# Patient Record
Sex: MALE | Race: ASIAN | NOT HISPANIC OR LATINO
[De-identification: names, ages, dates, MRNs, and addresses within clinical notes are randomized per-mention and may not be internally consistent; named-entity substitution may affect disease eponyms.]

---

## 2019-02-08 ENCOUNTER — APPOINTMENT (OUTPATIENT)
Dept: CARDIOLOGY | Facility: CLINIC | Age: 79
End: 2019-02-08
Payer: MEDICARE

## 2019-02-08 ENCOUNTER — NON-APPOINTMENT (OUTPATIENT)
Age: 79
End: 2019-02-08

## 2019-02-08 VITALS
RESPIRATION RATE: 18 BRPM | DIASTOLIC BLOOD PRESSURE: 84 MMHG | WEIGHT: 153 LBS | BODY MASS INDEX: 25.49 KG/M2 | SYSTOLIC BLOOD PRESSURE: 161 MMHG | HEART RATE: 94 BPM | HEIGHT: 65 IN | TEMPERATURE: 98.3 F | OXYGEN SATURATION: 99 %

## 2019-02-08 DIAGNOSIS — Z98.890 OTHER SPECIFIED POSTPROCEDURAL STATES: ICD-10-CM

## 2019-02-08 DIAGNOSIS — Z87.898 PERSONAL HISTORY OF OTHER SPECIFIED CONDITIONS: ICD-10-CM

## 2019-02-08 DIAGNOSIS — R06.09 OTHER FORMS OF DYSPNEA: ICD-10-CM

## 2019-02-08 DIAGNOSIS — G40.909 EPILEPSY, UNSPECIFIED, NOT INTRACTABLE, W/OUT STATUS EPILEPTICUS: ICD-10-CM

## 2019-02-08 DIAGNOSIS — E03.9 HYPOTHYROIDISM, UNSPECIFIED: ICD-10-CM

## 2019-02-08 DIAGNOSIS — R60.0 LOCALIZED EDEMA: ICD-10-CM

## 2019-02-08 DIAGNOSIS — Z78.9 OTHER SPECIFIED HEALTH STATUS: ICD-10-CM

## 2019-02-08 DIAGNOSIS — Z83.3 FAMILY HISTORY OF DIABETES MELLITUS: ICD-10-CM

## 2019-02-08 DIAGNOSIS — Z87.891 PERSONAL HISTORY OF NICOTINE DEPENDENCE: ICD-10-CM

## 2019-02-08 DIAGNOSIS — I10 ESSENTIAL (PRIMARY) HYPERTENSION: ICD-10-CM

## 2019-02-08 DIAGNOSIS — Z82.49 FAMILY HISTORY OF ISCHEMIC HEART DISEASE AND OTHER DISEASES OF THE CIRCULATORY SYSTEM: ICD-10-CM

## 2019-02-08 PROCEDURE — 93000 ELECTROCARDIOGRAM COMPLETE: CPT | Mod: 59

## 2019-02-08 PROCEDURE — 93306 TTE W/DOPPLER COMPLETE: CPT

## 2019-02-08 PROCEDURE — 99205 OFFICE O/P NEW HI 60 MIN: CPT

## 2019-02-08 RX ORDER — LOSARTAN POTASSIUM 50 MG/1
50 TABLET, FILM COATED ORAL
Qty: 30 | Refills: 1 | Status: ACTIVE | COMMUNITY
Start: 2019-02-08 | End: 1900-01-01

## 2019-02-08 RX ORDER — PROPRANOLOL HYDROCHLORIDE 20 MG/1
20 TABLET ORAL TWICE DAILY
Qty: 60 | Refills: 1 | Status: ACTIVE | COMMUNITY
Start: 1900-01-01 | End: 1900-01-01

## 2019-02-08 RX ORDER — CLOTRIMAZOLE AND BETAMETHASONE DIPROPIONATE 10; .5 MG/G; MG/G
1-0.05 CREAM TOPICAL
Qty: 45 | Refills: 0 | Status: ACTIVE | COMMUNITY
Start: 2018-09-26

## 2019-02-08 RX ORDER — TOBRAMYCIN AND DEXAMETHASONE 3; 1 MG/ML; MG/ML
0.3-0.1 SUSPENSION/ DROPS OPHTHALMIC
Qty: 5 | Refills: 0 | Status: ACTIVE | COMMUNITY
Start: 2018-12-17

## 2019-02-08 NOTE — DISCUSSION/SUMMARY
[Hypertension] : hypertension [Stable] : stable [Echocardiogram] : echocardiogram [Medication Changes Per Orders] : as documented in orders [Sodium Restriction] : sodium restriction [Patient] : the patient [Family] : the patient's family [de-identified] : increase betablocker, adding ACEI, cholesterol as well. [FreeTextEntry1] : s/p dissection of ascending aorta  post repair was discussed.  need to control BP, reduce cholesterol, watch for renal disease. etc. The leg edema likely related to the  CCB.

## 2019-02-08 NOTE — PHYSICAL EXAM
[General Appearance - Well Developed] : well developed [Normal Appearance] : normal appearance [Well Groomed] : well groomed [General Appearance - Well Nourished] : well nourished [No Deformities] : no deformities [General Appearance - In No Acute Distress] : no acute distress [Normal Conjunctiva] : the conjunctiva exhibited no abnormalities [Eyelids - No Xanthelasma] : the eyelids demonstrated no xanthelasmas [Normal Oral Mucosa] : normal oral mucosa [No Oral Pallor] : no oral pallor [No Oral Cyanosis] : no oral cyanosis [Normal Jugular Venous A Waves Present] : normal jugular venous A waves present [Normal Jugular Venous V Waves Present] : normal jugular venous V waves present [No Jugular Venous Crane A Waves] : no jugular venous crane A waves [Heart Rate And Rhythm] : heart rate and rhythm were normal [Heart Sounds] : normal S1 and S2 [Murmurs] : no murmurs present [Arterial Pulses Normal] : the arterial pulses were normal [Edema] : no peripheral edema present [Veins - Varicosity Changes] : no varicosital changes were noted in the lower extremities [Respiration, Rhythm And Depth] : normal respiratory rhythm and effort [Exaggerated Use Of Accessory Muscles For Inspiration] : no accessory muscle use [Auscultation Breath Sounds / Voice Sounds] : lungs were clear to auscultation bilaterally [Chest Palpation] : palpation of the chest revealed no abnormalities [Lungs Percussion] : the lungs were normal to percussion [Bowel Sounds] : normal bowel sounds [Abdomen Soft] : soft [Abdomen Tenderness] : non-tender [Abdomen Mass (___ Cm)] : no abdominal mass palpated [Abdomen Hernia] : no hernia was discovered [Abnormal Walk] : normal gait [Gait - Sufficient For Exercise Testing] : the gait was sufficient for exercise testing [Nail Clubbing] : no clubbing of the fingernails [Cyanosis, Localized] : no localized cyanosis [Petechial Hemorrhages (___cm)] : no petechial hemorrhages [Skin Color & Pigmentation] : normal skin color and pigmentation [Skin Turgor] : normal skin turgor [] : no rash [No Venous Stasis] : no venous stasis [Skin Lesions] : no skin lesions [No Skin Ulcers] : no skin ulcer [No Xanthoma] : no  xanthoma was observed [Oriented To Time, Place, And Person] : oriented to person, place, and time [Impaired Insight] : insight and judgment were intact [Affect] : the affect was normal [Mood] : the mood was normal [Memory Recent] : recent memory was not impaired [Memory Remote] : remote memory was not impaired [No Anxiety] : not feeling anxious

## 2019-02-08 NOTE — HISTORY OF PRESENT ILLNESS
[FreeTextEntry1] : Patient, a 78 year old male at s/p  aortic dissection repair on 10- after syncope, is here with the complaints of exertional shortness of breath,sleepy and noted leg edema.\par He has no chest pain, no resting shortness of breath, no dizziness or palpitations.

## 2019-02-08 NOTE — REASON FOR VISIT
[Consultation] : a consultation regarding [Dyspnea] : dyspnea [Hypertension] : hypertension [FreeTextEntry1] : s/p aortic  dissection repair

## 2019-02-08 NOTE — REVIEW OF SYSTEMS
[Feeling Fatigued] : feeling fatigued [Dyspnea on exertion] : dyspnea during exertion [Lower Ext Edema] : lower extremity edema [Negative] : Heme/Lymph [Fever] : no fever [Headache] : no headache [Chills] : no chills [Blurry Vision] : no blurred vision [Seeing Double (Diplopia)] : no diplopia [Eye Pain] : no eye pain [Eyeglasses] : not currently wearing eyeglasses [Earache] : no earache [Discharge From The Ears] : no discharge from the ears [Loss Of Hearing] : no hearing loss [Mouth Sores] : no mouth sores [Sore Throat] : no sore throat [Sinus Pressure] : no sinus pressure [Shortness Of Breath] : no shortness of breath [Chest  Pressure] : no chest pressure [Chest Pain] : no chest pain [Leg Claudication] : no intermittent leg claudication [Palpitations] : no palpitations [Cough] : no cough [Wheezing] : no wheezing [Coughing Up Blood] : no hemoptysis [Abdominal Pain] : no abdominal pain [Nausea] : no nausea [Vomiting] : no vomiting [Heartburn] : no heartburn [Change in Appetite] : no change in appetite [Change In The Stool] : no change in stool [Dysphagia] : no dysphagia [Urinary Frequency] : no change in urinary frequency [Hematuria] : no hematuria [Pain During Urination] : no dysuria [Impotence] : no impotence [Nocturia] : no nocturia [Joint Pain] : no joint pain [Joint Swelling] : no joint swelling [Joint Stiffness] : no joint stiffness [Muscle Cramps] : no muscle cramps [Limb Weakness (Paresis)] : no limb weakness [Skin: A Rash] : no rash: [Itching] : no itching [Change In Color Of Skin] : change in skin color [Skin Lesions] : no skin lesions [Dizziness] : no dizziness [Tremor] : no tremor was seen [Numbness (Hypesthesia)] : no numbness [Convulsions] : no convulsions [Tingling (Paresthesia)] : no tingling [Confusion] : no confusion was observed [Memory Lapses Or Loss] : no memory lapses or loss [Depression] : no depression [Anxiety] : no anxiety [Under Stress] : not under stress [Suicidal] : not suicidal [Excessive Thirst] : no polydipsia [Easy Bleeding] : no tendency for easy bleeding [Swollen Glands] : no swollen glands [Easy Bruising] : no tendency for easy bruising [Swollen Glands In The Neck] : no swollen glands in the neck

## 2019-02-11 ENCOUNTER — MEDICATION RENEWAL (OUTPATIENT)
Age: 79
End: 2019-02-11

## 2019-02-11 DIAGNOSIS — N28.9 DISORDER OF KIDNEY AND URETER, UNSPECIFIED: ICD-10-CM

## 2019-02-15 ENCOUNTER — LABORATORY RESULT (OUTPATIENT)
Age: 79
End: 2019-02-15

## 2019-02-15 PROBLEM — E03.9 HYPOTHYROIDISM, UNSPECIFIED TYPE: Status: ACTIVE | Noted: 2019-02-15

## 2019-02-15 LAB
24R-OH-CALCIDIOL SERPL-MCNC: 43.4 PG/ML
ALBUMIN SERPL ELPH-MCNC: 3.9 G/DL
ALP BLD-CCNC: 57 U/L
ALT SERPL-CCNC: 12 U/L
ANION GAP SERPL CALC-SCNC: 16 MMOL/L
AST SERPL-CCNC: 17 U/L
BASOPHILS # BLD AUTO: 0.02 K/UL
BASOPHILS NFR BLD AUTO: 0.3 %
BILIRUB SERPL-MCNC: <0.2 MG/DL
BUN SERPL-MCNC: 31 MG/DL
CALCIUM SERPL-MCNC: 9.3 MG/DL
CHLORIDE SERPL-SCNC: 105 MMOL/L
CHOLEST SERPL-MCNC: 239 MG/DL
CHOLEST/HDLC SERPL: 3.8 RATIO
CO2 SERPL-SCNC: 22 MMOL/L
CREAT SERPL-MCNC: 1.71 MG/DL
EOSINOPHIL # BLD AUTO: 0.39 K/UL
EOSINOPHIL NFR BLD AUTO: 5.3 %
GLUCOSE SERPL-MCNC: 82 MG/DL
HBA1C MFR BLD HPLC: 5.6 %
HCT VFR BLD CALC: 31.2 %
HDLC SERPL-MCNC: 63 MG/DL
HGB BLD-MCNC: 10 G/DL
IMM GRANULOCYTES NFR BLD AUTO: 0.3 %
LDLC SERPL CALC-MCNC: 155 MG/DL
LYMPHOCYTES # BLD AUTO: 0.96 K/UL
LYMPHOCYTES NFR BLD AUTO: 13 %
MAN DIFF?: NORMAL
MCHC RBC-ENTMCNC: 31 PG
MCHC RBC-ENTMCNC: 32.1 GM/DL
MCV RBC AUTO: 96.6 FL
MONOCYTES # BLD AUTO: 0.61 K/UL
MONOCYTES NFR BLD AUTO: 8.3 %
NEUTROPHILS # BLD AUTO: 5.38 K/UL
NEUTROPHILS NFR BLD AUTO: 72.8 %
NT-PROBNP SERPL-MCNC: 1694 PG/ML
PLATELET # BLD AUTO: 286 K/UL
POTASSIUM SERPL-SCNC: 5.2 MMOL/L
PROT SERPL-MCNC: 7.6 G/DL
RBC # BLD: 3.23 M/UL
RBC # FLD: 13.1 %
SODIUM SERPL-SCNC: 143 MMOL/L
T4 SERPL-MCNC: 7.1 UG/DL
TRIGL SERPL-MCNC: 106 MG/DL
TSH SERPL-ACNC: 11.94 UIU/ML
URATE SERPL-MCNC: 7.6 MG/DL
WBC # FLD AUTO: 7.38 K/UL

## 2019-02-15 RX ORDER — LEVOTHYROXINE SODIUM 0.03 MG/1
25 TABLET ORAL
Qty: 90 | Refills: 0 | Status: ACTIVE | COMMUNITY
Start: 2019-02-15 | End: 1900-01-01

## 2021-11-09 ENCOUNTER — INPATIENT (INPATIENT)
Facility: HOSPITAL | Age: 81
LOS: 3 days | Discharge: HOME | DRG: 668 | End: 2021-11-13
Attending: UROLOGY | Admitting: UROLOGY
Payer: COMMERCIAL

## 2021-11-09 VITALS
SYSTOLIC BLOOD PRESSURE: 150 MMHG | RESPIRATION RATE: 18 BRPM | WEIGHT: 160.06 LBS | HEIGHT: 64 IN | HEART RATE: 68 BPM | DIASTOLIC BLOOD PRESSURE: 86 MMHG | TEMPERATURE: 98 F | OXYGEN SATURATION: 98 %

## 2021-11-09 DIAGNOSIS — N32.89 OTHER SPECIFIED DISORDERS OF BLADDER: ICD-10-CM

## 2021-11-09 LAB
ALBUMIN SERPL ELPH-MCNC: 4.2 G/DL — SIGNIFICANT CHANGE UP (ref 3.3–5)
ALP SERPL-CCNC: 80 U/L — SIGNIFICANT CHANGE UP (ref 40–120)
ALT FLD-CCNC: 18 U/L — SIGNIFICANT CHANGE UP (ref 10–45)
ANION GAP SERPL CALC-SCNC: 12 MMOL/L — SIGNIFICANT CHANGE UP (ref 5–17)
APPEARANCE UR: ABNORMAL
APTT BLD: 42 SEC — HIGH (ref 27.5–35.5)
AST SERPL-CCNC: 23 U/L — SIGNIFICANT CHANGE UP (ref 10–40)
BACTERIA # UR AUTO: NEGATIVE — SIGNIFICANT CHANGE UP
BASOPHILS # BLD AUTO: 0.03 K/UL — SIGNIFICANT CHANGE UP (ref 0–0.2)
BASOPHILS # BLD AUTO: 0.03 K/UL — SIGNIFICANT CHANGE UP (ref 0–0.2)
BASOPHILS NFR BLD AUTO: 0.5 % — SIGNIFICANT CHANGE UP (ref 0–2)
BASOPHILS NFR BLD AUTO: 0.5 % — SIGNIFICANT CHANGE UP (ref 0–2)
BILIRUB SERPL-MCNC: 0.2 MG/DL — SIGNIFICANT CHANGE UP (ref 0.2–1.2)
BILIRUB UR-MCNC: NEGATIVE — SIGNIFICANT CHANGE UP
BLD GP AB SCN SERPL QL: NEGATIVE — SIGNIFICANT CHANGE UP
BUN SERPL-MCNC: 31 MG/DL — HIGH (ref 7–23)
CALCIUM SERPL-MCNC: 8.5 MG/DL — SIGNIFICANT CHANGE UP (ref 8.4–10.5)
CHLORIDE SERPL-SCNC: 110 MMOL/L — HIGH (ref 96–108)
CO2 SERPL-SCNC: 17 MMOL/L — LOW (ref 22–31)
COLOR SPEC: ABNORMAL
CREAT SERPL-MCNC: 1.52 MG/DL — HIGH (ref 0.5–1.3)
DIFF PNL FLD: ABNORMAL
EOSINOPHIL # BLD AUTO: 0.28 K/UL — SIGNIFICANT CHANGE UP (ref 0–0.5)
EOSINOPHIL # BLD AUTO: 0.36 K/UL — SIGNIFICANT CHANGE UP (ref 0–0.5)
EOSINOPHIL NFR BLD AUTO: 4.9 % — SIGNIFICANT CHANGE UP (ref 0–6)
EOSINOPHIL NFR BLD AUTO: 6.1 % — HIGH (ref 0–6)
EPI CELLS # UR: 0 — SIGNIFICANT CHANGE UP
GLUCOSE SERPL-MCNC: 102 MG/DL — HIGH (ref 70–99)
GLUCOSE UR QL: NEGATIVE — SIGNIFICANT CHANGE UP
HCT VFR BLD CALC: 28.5 % — LOW (ref 39–50)
HCT VFR BLD CALC: 30.3 % — LOW (ref 39–50)
HGB BLD-MCNC: 9.2 G/DL — LOW (ref 13–17)
HGB BLD-MCNC: 9.8 G/DL — LOW (ref 13–17)
HYALINE CASTS # UR AUTO: 0 /LPF — SIGNIFICANT CHANGE UP (ref 0–7)
IMM GRANULOCYTES NFR BLD AUTO: 0.2 % — SIGNIFICANT CHANGE UP (ref 0–1.5)
IMM GRANULOCYTES NFR BLD AUTO: 0.4 % — SIGNIFICANT CHANGE UP (ref 0–1.5)
INR BLD: 1.24 RATIO — HIGH (ref 0.88–1.16)
KETONES UR-MCNC: NEGATIVE — SIGNIFICANT CHANGE UP
LEUKOCYTE ESTERASE UR-ACNC: NEGATIVE — SIGNIFICANT CHANGE UP
LYMPHOCYTES # BLD AUTO: 1.11 K/UL — SIGNIFICANT CHANGE UP (ref 1–3.3)
LYMPHOCYTES # BLD AUTO: 1.25 K/UL — SIGNIFICANT CHANGE UP (ref 1–3.3)
LYMPHOCYTES # BLD AUTO: 19.6 % — SIGNIFICANT CHANGE UP (ref 13–44)
LYMPHOCYTES # BLD AUTO: 21.2 % — SIGNIFICANT CHANGE UP (ref 13–44)
MCHC RBC-ENTMCNC: 32.2 PG — SIGNIFICANT CHANGE UP (ref 27–34)
MCHC RBC-ENTMCNC: 32.3 GM/DL — SIGNIFICANT CHANGE UP (ref 32–36)
MCHC RBC-ENTMCNC: 32.3 GM/DL — SIGNIFICANT CHANGE UP (ref 32–36)
MCHC RBC-ENTMCNC: 32.3 PG — SIGNIFICANT CHANGE UP (ref 27–34)
MCV RBC AUTO: 100 FL — SIGNIFICANT CHANGE UP (ref 80–100)
MCV RBC AUTO: 99.7 FL — SIGNIFICANT CHANGE UP (ref 80–100)
MONOCYTES # BLD AUTO: 0.46 K/UL — SIGNIFICANT CHANGE UP (ref 0–0.9)
MONOCYTES # BLD AUTO: 0.52 K/UL — SIGNIFICANT CHANGE UP (ref 0–0.9)
MONOCYTES NFR BLD AUTO: 8.1 % — SIGNIFICANT CHANGE UP (ref 2–14)
MONOCYTES NFR BLD AUTO: 8.8 % — SIGNIFICANT CHANGE UP (ref 2–14)
NEUTROPHILS # BLD AUTO: 3.74 K/UL — SIGNIFICANT CHANGE UP (ref 1.8–7.4)
NEUTROPHILS # BLD AUTO: 3.76 K/UL — SIGNIFICANT CHANGE UP (ref 1.8–7.4)
NEUTROPHILS NFR BLD AUTO: 63.2 % — SIGNIFICANT CHANGE UP (ref 43–77)
NEUTROPHILS NFR BLD AUTO: 66.5 % — SIGNIFICANT CHANGE UP (ref 43–77)
NITRITE UR-MCNC: NEGATIVE — SIGNIFICANT CHANGE UP
NRBC # BLD: 0 /100 WBCS — SIGNIFICANT CHANGE UP (ref 0–0)
NRBC # BLD: 0 /100 WBCS — SIGNIFICANT CHANGE UP (ref 0–0)
PH UR: 6.5 — SIGNIFICANT CHANGE UP (ref 5–8)
PLATELET # BLD AUTO: 137 K/UL — LOW (ref 150–400)
PLATELET # BLD AUTO: 144 K/UL — LOW (ref 150–400)
POTASSIUM SERPL-MCNC: 5.1 MMOL/L — SIGNIFICANT CHANGE UP (ref 3.5–5.3)
POTASSIUM SERPL-SCNC: 5.1 MMOL/L — SIGNIFICANT CHANGE UP (ref 3.5–5.3)
PROT SERPL-MCNC: 6.8 G/DL — SIGNIFICANT CHANGE UP (ref 6–8.3)
PROT UR-MCNC: ABNORMAL
PROTHROM AB SERPL-ACNC: 14.7 SEC — HIGH (ref 10.6–13.6)
RBC # BLD: 2.86 M/UL — LOW (ref 4.2–5.8)
RBC # BLD: 3.03 M/UL — LOW (ref 4.2–5.8)
RBC # FLD: 13.5 % — SIGNIFICANT CHANGE UP (ref 10.3–14.5)
RBC # FLD: 13.5 % — SIGNIFICANT CHANGE UP (ref 10.3–14.5)
RBC CASTS # UR COMP ASSIST: >50 /HPF — HIGH (ref 0–4)
RH IG SCN BLD-IMP: POSITIVE — SIGNIFICANT CHANGE UP
SARS-COV-2 RNA SPEC QL NAA+PROBE: SIGNIFICANT CHANGE UP
SODIUM SERPL-SCNC: 139 MMOL/L — SIGNIFICANT CHANGE UP (ref 135–145)
SP GR SPEC: 1.01 — LOW (ref 1.01–1.02)
UROBILINOGEN FLD QL: NEGATIVE — SIGNIFICANT CHANGE UP
WBC # BLD: 5.66 K/UL — SIGNIFICANT CHANGE UP (ref 3.8–10.5)
WBC # BLD: 5.91 K/UL — SIGNIFICANT CHANGE UP (ref 3.8–10.5)
WBC # FLD AUTO: 5.66 K/UL — SIGNIFICANT CHANGE UP (ref 3.8–10.5)
WBC # FLD AUTO: 5.91 K/UL — SIGNIFICANT CHANGE UP (ref 3.8–10.5)
WBC UR QL: 0 /HPF — SIGNIFICANT CHANGE UP (ref 0–5)

## 2021-11-09 PROCEDURE — 74176 CT ABD & PELVIS W/O CONTRAST: CPT | Mod: 26,MA

## 2021-11-09 PROCEDURE — 36000 PLACE NEEDLE IN VEIN: CPT | Mod: GC

## 2021-11-09 PROCEDURE — 99285 EMERGENCY DEPT VISIT HI MDM: CPT | Mod: 25,GC

## 2021-11-09 PROCEDURE — 99223 1ST HOSP IP/OBS HIGH 75: CPT

## 2021-11-09 RX ORDER — ACETAMINOPHEN 500 MG
975 TABLET ORAL EVERY 6 HOURS
Refills: 0 | Status: DISCONTINUED | OUTPATIENT
Start: 2021-11-09 | End: 2021-11-12

## 2021-11-09 RX ORDER — CEFAZOLIN SODIUM 1 G
1000 VIAL (EA) INJECTION EVERY 8 HOURS
Refills: 0 | Status: DISCONTINUED | OUTPATIENT
Start: 2021-11-09 | End: 2021-11-12

## 2021-11-09 RX ORDER — ONDANSETRON 8 MG/1
4 TABLET, FILM COATED ORAL EVERY 6 HOURS
Refills: 0 | Status: DISCONTINUED | OUTPATIENT
Start: 2021-11-09 | End: 2021-11-12

## 2021-11-09 RX ORDER — FUROSEMIDE 40 MG
20 TABLET ORAL DAILY
Refills: 0 | Status: DISCONTINUED | OUTPATIENT
Start: 2021-11-09 | End: 2021-11-12

## 2021-11-09 RX ORDER — HEPARIN SODIUM 5000 [USP'U]/ML
5000 INJECTION INTRAVENOUS; SUBCUTANEOUS EVERY 8 HOURS
Refills: 0 | Status: DISCONTINUED | OUTPATIENT
Start: 2021-11-09 | End: 2021-11-12

## 2021-11-09 RX ORDER — LOSARTAN POTASSIUM 100 MG/1
50 TABLET, FILM COATED ORAL DAILY
Refills: 0 | Status: DISCONTINUED | OUTPATIENT
Start: 2021-11-09 | End: 2021-11-12

## 2021-11-09 RX ORDER — SODIUM CHLORIDE 9 MG/ML
500 INJECTION INTRAMUSCULAR; INTRAVENOUS; SUBCUTANEOUS ONCE
Refills: 0 | Status: COMPLETED | OUTPATIENT
Start: 2021-11-09 | End: 2021-11-09

## 2021-11-09 RX ORDER — SODIUM CHLORIDE 9 MG/ML
1000 INJECTION INTRAMUSCULAR; INTRAVENOUS; SUBCUTANEOUS
Refills: 0 | Status: DISCONTINUED | OUTPATIENT
Start: 2021-11-09 | End: 2021-11-12

## 2021-11-09 RX ORDER — SENNA PLUS 8.6 MG/1
2 TABLET ORAL AT BEDTIME
Refills: 0 | Status: DISCONTINUED | OUTPATIENT
Start: 2021-11-09 | End: 2021-11-12

## 2021-11-09 RX ORDER — PRIMIDONE 250 MG/1
250 TABLET ORAL DAILY
Refills: 0 | Status: DISCONTINUED | OUTPATIENT
Start: 2021-11-09 | End: 2021-11-12

## 2021-11-09 RX ADMIN — HEPARIN SODIUM 5000 UNIT(S): 5000 INJECTION INTRAVENOUS; SUBCUTANEOUS at 22:22

## 2021-11-09 RX ADMIN — SODIUM CHLORIDE 333.33 MILLILITER(S): 9 INJECTION INTRAMUSCULAR; INTRAVENOUS; SUBCUTANEOUS at 12:04

## 2021-11-09 RX ADMIN — Medication 100 MILLIGRAM(S): at 22:21

## 2021-11-09 NOTE — ED PROCEDURE NOTE - PROCEDURE ADDITIONAL DETAILS
Emergency Department Focused Ultrasound performed at patient's bedside for educational purposes. An appropriate follow up study is ordered. -Julio Cesar Lam PA-C
Peripheral IV access in the Emergency Department obtained under dynamic ultrasound guidance with dark nonpulsatile blood return. Vein was cannulated and blood was obtained, however line presented resistance to flush with NS, no extravasation. Cannula was subsequently removed.

## 2021-11-09 NOTE — H&P ADULT - NSHPPHYSICALEXAM_GEN_ALL_CORE
Physical Exam  Vital signs  T(C): 36.8 (11-09-21 @ 09:59), Max: 36.8 (11-09-21 @ 09:59)  HR: 77 (11-09-21 @ 14:06)  BP: 158/81 (11-09-21 @ 14:06)  SpO2: 100% (11-09-21 @ 14:06)    Output    Gen: NAD  Pulm: No respiratory distress  Abd: soft, nontender, nondistended. no rebound or guarding  Back: no CVAT b/l  : no suprapubic tenderness. uncirc penis. small stain of hematuria on underwear. no blood or clots at urethral meatus. testes descended b/l and nontender. voided urine cranberry, no clots. PVR 250cc.     Using aseptic technique, attempted to place 22F 3way without success given tight meatus, 16F placed with initially a little resistance, but then went into bladder. Urine color initially pink, but darkened to punch. no clots visualized. bladder irrigated with NS without aspiration of clots and color improvement to joselyn. 10cc in balloon. secured with stat lock

## 2021-11-09 NOTE — ED ADULT NURSE NOTE - NSIMPLEMENTINTERV_GEN_ALL_ED
Implemented All Universal Safety Interventions:  [place] to call system. Call bell, personal items and telephone within reach. Instruct patient to call for assistance. Room bathroom lighting operational. Non-slip footwear when patient is off stretcher. Physically safe environment: no spills, clutter or unnecessary equipment. Stretcher in lowest position, wheels locked, appropriate side rails in place.

## 2021-11-09 NOTE — ED PROVIDER NOTE - ATTENDING CONTRIBUTION TO CARE
RGUJRAL 82yo male hx HTN, seizures presents with intermittent hematuria since November 1st. Patient had US renal that was reportedly normal per son. No abd pain, dysuria, fever, chills, back pain. No anticogulation.+ Hx smoking in past. As per son pt's bleeding has been increasing in the past few days so came in today for eval.  On exam, Patient is awake,alert,oriented x 3. Patient is well appearing and in no acute distress. Patient's chest is clear to ausculation, +s1s2. Abdomen is soft nd/nt +BS. Extremity with no swelling or calf tenderness. No cvat.   Pt denies any chest pain, palp, sob, lightheadedness.  Check labs, CT/UA to eval. Pt denies any retention.

## 2021-11-09 NOTE — ED ADULT NURSE NOTE - OBJECTIVE STATEMENT
80 y/o male PMHX HTN HLD, accompanied by son, presents with complaints of blood in urine since 11/1. pt denies any burning urination, denies any abdominal pain denies any fever, chills, nausea, vomiting or diarrhea. denies any blood in stool. states went to get a KUB ultrasound done on 11/2 and was told to come to the ED for evaluation. denies any CP or SOB, denies any headache, blurry or double vision. safety precautions in place call bell in reach.

## 2021-11-09 NOTE — ED PROVIDER NOTE - NSFOLLOWUPINSTRUCTIONS_ED_ALL_ED_FT
You were seen and evaluated in the ED for hematuria (blood in the urine). On his CT scan, it showed a tear in the aorta which is the large blood vessel in his chest and abdomen. This tear was seen on prior imaging back in 2013. However, the collection of blood has increased in size. The cardiac surgeons do not recommend acute intervention but he will need to be followed up outpatient very closely.    In regards to the bladder mass, this may be the reason for the blood in his urine. He will need to follow up with Urology as an outpatient for work up.

## 2021-11-09 NOTE — H&P ADULT - HISTORY OF PRESENT ILLNESS
HPI: 80 y/o M with PMHx of HTN, aortic aneurysm, and seizure d/o presents to the ED with complaint of gross hematuria. Pt has had gross hematuria over the past 1-2 weeks. Followed up outpatient with PCP who sent for renal/bladder US (which son reported was normal) and discussed to have pt follow up with urologist, but has not made appointment yet. Last night, urine color got much darker. Has been able to void. Had three separate incidences of clots, but not with each void. Feels like he empties his bladder after he is done voiding. Has not had gross hematuria before. Not on AC or blood thinning medication. Denies fever/chills, chest pain, shortness of breath, abd pain, N/V, suprapubic pain, flank pain, difficulty voiding, dysuria or other acute complaint at this time.  Pt has a remote tobacco use history, quit 30 years ago, but smoked for roughly 1/2ppd for approx. 7-8 years. 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 No family history of cancer. No personal history of cancer. No previous  history.

## 2021-11-09 NOTE — ED ADULT TRIAGE NOTE - WEIGHT METHOD
Informed the patient she needs to have a surgery date before we can schedule the appointments.  She will FU with the podiatrist. stated

## 2021-11-09 NOTE — H&P ADULT - NSHPLABSRESULTS_GEN_ALL_CORE
LABS:                  9.2    5.66  )-----------( 137      ( 2021 16:19 )             28.5         139  |  110<H>  |  31<H>  ----------------------------<  102<H>  5.1   |  17<L>  |  1.52<H>    Ca    8.5      2021 12:09    TPro  6.8  /  Alb  4.2  /  TBili  0.2  /  DBili  x   /  AST  23  /  ALT  18  /  AlkPhos  80  11-    PT/INR - ( 2021 15:08 )   PT: 14.7 sec;   INR: 1.24 ratio         PTT - ( 2021 15:08 )  PTT:42.0 sec  Urinalysis Basic - ( 2021 11:53 )  Color: Red / Appearance: Turbid / S.007 / pH: x  Gluc: x / Ketone: Negative  / Bili: Negative / Urobili: Negative   Blood: x / Protein: 100 mg/dL / Nitrite: Negative   Leuk Esterase: Negative / RBC: >50 /hpf / WBC 0 /HPF   Sq Epi: x / Non Sq Epi: 0 / Bacteria: Negative    Urine Cx: pending    RADIOLOGY:  < from: CT Abdomen and Pelvis No Cont (21 @ 12:57) >  EXAM:  CT ABDOMEN AND PELVIS                          PROCEDURE DATE:  2021      INTERPRETATION:  CLINICAL INFORMATION: eval for renal stone    COMPARISON: 10.11.13.    CONTRAST/COMPLICATIONS:  IV Contrast: None  Oral Contrast: None  Complications: None    PROCEDURE:  CT of the Abdomen and Pelvis was performed.  Sagittal and coronal reformats were performed.    FINDINGS:    LOWER CHEST: Patchy airspace opacity in the medial right lower lobe is noted.    LIVER: Within normal limits.  BILE DUCTS: Normal caliber.  GALLBLADDER: Within normal limits.  SPLEEN: Within normal limits.  PANCREAS: Within normal limits.  ADRENALS: Stable nodularity of the left adrenal gland.  KIDNEYS/URETERS: Punctate right renal calculi. Parenchymal and parapelvic cysts. Mild left hydronephrosis.    BLADDER: A 2 cm left posterior bladder mass is noted.  REPRODUCTIVE ORGANS: Within normal limits.    BOWEL: No bowel obstruction.  PERITONEUM: No ascites.  VESSELS:  Dissection involving the thoracoabdominal aorta is noted. The descending thoracic aorta measures 4.7 cm previously 2.5 cm.  RETROPERITONEUM/LYMPH NODES: No lymphadenopathy.  ABDOMINAL WALL: Within normal limits.  BONES: Within normal limits.    IMPRESSION: Aortic dissection again noted. Aneurysm of the descending thoracic aorta is increased in size.    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    Medial right lung opacity is indeterminate; recommend one-month follow-up chest CT.    --- End of Report ---    MITRA JUAREZ MD; Attending Radiologist    < end of copied text >

## 2021-11-09 NOTE — H&P ADULT - ASSESSMENT
82 y/o M with PMHx of HTN, aortic aneurysm, and seizure d/o here with gross hematuria. CT scan demonstrated 2cm bladder mass with mild L hydro. H/H 9.2/28.5.     - Admit to  under Dr. Francis  - continue alvarez catheter, may eventually need upsize and CBI, but draining well now   - likely add onto OR for TURBT on Friday  - will need medical clearance  - monitor urine color. hand irrigate PRN  - trend H/H  - Regular diet  - gentle IVF  - Analgesia and antiemetics PRN  - Ancef for  ppx given irrigation  - follow up urine culture  - AM labs  - Strict I &Os  - home meds added  - DVT ppx  - Incentive Spirometry    d/w Dr. Matias and Dr. Francis

## 2021-11-09 NOTE — ED PROVIDER NOTE - ENMT NEGATIVE STATEMENT, MLM
Nose: no nasal congestion and no nasal drainage. Mouth/Throat: no hoarseness and no throat pain. Neck: no lumps, no pain, no stiffness and no swollen glands.

## 2021-11-09 NOTE — ED PROVIDER NOTE - OBJECTIVE STATEMENT
Chantal is an 82yo male with PMHx of HTN and seizures who is presenting with intermittent gross hematuria since 11/1 without associated abdominal pain, flank pain, dysuria, fever, or edema. No recent URI symptoms or fever. He was seen by his PCP who recommended renal/bladder US and Urology follow up. His outpatient appointment is in 1 month. His son reports that the US was read as normal. He brought his father in today because the appointment is too far away but no major changes in his health have occurred.   Fellow Note: Bambi Bains, DO PGY-6 Chantal is an 80yo male with PMHx of HTN and seizures who is presenting with intermittent gross hematuria since 11/1 without associated abdominal pain, flank pain, dysuria, fever, or edema. No recent URI symptoms or fever. He was seen by his PCP who recommended renal/bladder US and Urology follow up. His outpatient appointment is in 1 month. His son reports that the US was read as normal. He brought his father in today because the appointment is too far away but no major changes in his health have occurred.  #[id number]  Fellow Note: Bambi Bains, DO PGY-6

## 2021-11-09 NOTE — ED PROCEDURE NOTE - CPROC ED TIME OUT STATEMENT1
“Patient's name, , procedure and correct site were confirmed during the [place] Timeout.”
“Patient's name, , procedure and correct site were confirmed during the [place] Timeout.”

## 2021-11-09 NOTE — ED PROVIDER NOTE - PROGRESS NOTE DETAILS
The labs show signs of AUSTEN with a creatinine of 1.52 and K of 5.1. Will give fluids and pending CT abd.  Fellow Note: Bamib Bains, DO PGY-6 The CT abd/pelvis shows a thoracic aortic dissection (seen on 2013 CT scan) with an aneurysm of the descending thoracic aorta is increased in size (from 2.5 to 4.7). Also a 2 cm left bladder mass suspicious, mild left hydronephrosis, and punctate renal caliculi. A CTA chest/abd/pelvis was ordered as well as coags, T&S. His UA showed gross blood. CT surgery was consulted.  Fellow Note: Bambi Bains,  PGY-6  #:[id number]  The patient was updated on the CT results and plan. Attempted to call the son Pb ([phone]) but no answer.   Fellow Note: Bambi Bains,  PGY-6 Updated the son on  the CT scan results and plan.   Fellow Note: Bambi Bains, DO PGY-6 CT surgery does not recommend urgent intervention for the dissection at this time as it was noted on prior CT in 2013. The aneurysm is larger so they recommend close outpatient follow up. For the bladder mass, will recommend outpatient urology follow up.  Fellow Note: Bambi Bains, DO PGY-6 Patient's CT findings noted, tried to call Radiology multiple times to review reading. In the mean time, CT surgery consulted and CTA ordered. On chart review found old dissection repair, CT surgery at [facility] states they are not clinically concerned to eval with a contrast study at this time and patient can follow up outpatient. Pt also noted to have a bladder mass and will need urology follow up. Results discussed using a  phone with patient and son. RGUJRAL Pt H/H stable 9.8/30.3 to 9.2/28.5. Paged Urology in regards to pt case who agree to see pt   Mara Mcdaniels PA-C Urology attempted to perform CBI, but could not get a large enough alvarez - patient will be admitted to urology.  - Ruchi Martinez, DO

## 2021-11-10 LAB
ANION GAP SERPL CALC-SCNC: 15 MMOL/L — SIGNIFICANT CHANGE UP (ref 5–17)
BASOPHILS # BLD AUTO: 0.03 K/UL — SIGNIFICANT CHANGE UP (ref 0–0.2)
BASOPHILS NFR BLD AUTO: 0.5 % — SIGNIFICANT CHANGE UP (ref 0–2)
BUN SERPL-MCNC: 33 MG/DL — HIGH (ref 7–23)
CALCIUM SERPL-MCNC: 8.3 MG/DL — LOW (ref 8.4–10.5)
CHLORIDE SERPL-SCNC: 109 MMOL/L — HIGH (ref 96–108)
CO2 SERPL-SCNC: 18 MMOL/L — LOW (ref 22–31)
COVID-19 NUCLEOCAPSID GAM AB INTERP: NEGATIVE — SIGNIFICANT CHANGE UP
COVID-19 NUCLEOCAPSID TOTAL GAM ANTIBODY RESULT: 0.09 INDEX — SIGNIFICANT CHANGE UP
COVID-19 SPIKE DOMAIN AB INTERP: POSITIVE
COVID-19 SPIKE DOMAIN ANTIBODY RESULT: 173 U/ML — HIGH
CREAT SERPL-MCNC: 1.51 MG/DL — HIGH (ref 0.5–1.3)
CULTURE RESULTS: SIGNIFICANT CHANGE UP
EOSINOPHIL # BLD AUTO: 0.31 K/UL — SIGNIFICANT CHANGE UP (ref 0–0.5)
EOSINOPHIL NFR BLD AUTO: 4.7 % — SIGNIFICANT CHANGE UP (ref 0–6)
GLUCOSE SERPL-MCNC: 88 MG/DL — SIGNIFICANT CHANGE UP (ref 70–99)
HCT VFR BLD CALC: 27.1 % — LOW (ref 39–50)
HGB BLD-MCNC: 8.9 G/DL — LOW (ref 13–17)
IMM GRANULOCYTES NFR BLD AUTO: 0.3 % — SIGNIFICANT CHANGE UP (ref 0–1.5)
LYMPHOCYTES # BLD AUTO: 1.23 K/UL — SIGNIFICANT CHANGE UP (ref 1–3.3)
LYMPHOCYTES # BLD AUTO: 18.7 % — SIGNIFICANT CHANGE UP (ref 13–44)
MCHC RBC-ENTMCNC: 32.2 PG — SIGNIFICANT CHANGE UP (ref 27–34)
MCHC RBC-ENTMCNC: 32.8 GM/DL — SIGNIFICANT CHANGE UP (ref 32–36)
MCV RBC AUTO: 98.2 FL — SIGNIFICANT CHANGE UP (ref 80–100)
MONOCYTES # BLD AUTO: 0.61 K/UL — SIGNIFICANT CHANGE UP (ref 0–0.9)
MONOCYTES NFR BLD AUTO: 9.3 % — SIGNIFICANT CHANGE UP (ref 2–14)
NEUTROPHILS # BLD AUTO: 4.37 K/UL — SIGNIFICANT CHANGE UP (ref 1.8–7.4)
NEUTROPHILS NFR BLD AUTO: 66.5 % — SIGNIFICANT CHANGE UP (ref 43–77)
NRBC # BLD: 0 /100 WBCS — SIGNIFICANT CHANGE UP (ref 0–0)
PLATELET # BLD AUTO: 143 K/UL — LOW (ref 150–400)
POTASSIUM SERPL-MCNC: 4.8 MMOL/L — SIGNIFICANT CHANGE UP (ref 3.5–5.3)
POTASSIUM SERPL-SCNC: 4.8 MMOL/L — SIGNIFICANT CHANGE UP (ref 3.5–5.3)
RBC # BLD: 2.76 M/UL — LOW (ref 4.2–5.8)
RBC # FLD: 13.5 % — SIGNIFICANT CHANGE UP (ref 10.3–14.5)
SARS-COV-2 IGG+IGM SERPL QL IA: 0.09 INDEX — SIGNIFICANT CHANGE UP
SARS-COV-2 IGG+IGM SERPL QL IA: 173 U/ML — HIGH
SARS-COV-2 IGG+IGM SERPL QL IA: NEGATIVE — SIGNIFICANT CHANGE UP
SARS-COV-2 IGG+IGM SERPL QL IA: POSITIVE
SODIUM SERPL-SCNC: 142 MMOL/L — SIGNIFICANT CHANGE UP (ref 135–145)
SPECIMEN SOURCE: SIGNIFICANT CHANGE UP
WBC # BLD: 6.57 K/UL — SIGNIFICANT CHANGE UP (ref 3.8–10.5)
WBC # FLD AUTO: 6.57 K/UL — SIGNIFICANT CHANGE UP (ref 3.8–10.5)

## 2021-11-10 PROCEDURE — 99233 SBSQ HOSP IP/OBS HIGH 50: CPT

## 2021-11-10 RX ORDER — HYDROMORPHONE HYDROCHLORIDE 2 MG/ML
1 INJECTION INTRAMUSCULAR; INTRAVENOUS; SUBCUTANEOUS ONCE
Refills: 0 | Status: DISCONTINUED | OUTPATIENT
Start: 2021-11-10 | End: 2021-11-10

## 2021-11-10 RX ORDER — POLYETHYLENE GLYCOL 3350 17 G/17G
17 POWDER, FOR SOLUTION ORAL ONCE
Refills: 0 | Status: COMPLETED | OUTPATIENT
Start: 2021-11-10 | End: 2021-11-10

## 2021-11-10 RX ORDER — INFLUENZA VIRUS VACCINE 15; 15; 15; 15 UG/.5ML; UG/.5ML; UG/.5ML; UG/.5ML
0.7 SUSPENSION INTRAMUSCULAR ONCE
Refills: 0 | Status: DISCONTINUED | OUTPATIENT
Start: 2021-11-10 | End: 2021-11-13

## 2021-11-10 RX ORDER — HYDROMORPHONE HYDROCHLORIDE 2 MG/ML
0.5 INJECTION INTRAMUSCULAR; INTRAVENOUS; SUBCUTANEOUS ONCE
Refills: 0 | Status: DISCONTINUED | OUTPATIENT
Start: 2021-11-10 | End: 2021-11-10

## 2021-11-10 RX ADMIN — LOSARTAN POTASSIUM 50 MILLIGRAM(S): 100 TABLET, FILM COATED ORAL at 06:11

## 2021-11-10 RX ADMIN — PRIMIDONE 250 MILLIGRAM(S): 250 TABLET ORAL at 11:37

## 2021-11-10 RX ADMIN — SODIUM CHLORIDE 50 MILLILITER(S): 9 INJECTION INTRAMUSCULAR; INTRAVENOUS; SUBCUTANEOUS at 00:32

## 2021-11-10 RX ADMIN — Medication 100 MILLIGRAM(S): at 13:17

## 2021-11-10 RX ADMIN — POLYETHYLENE GLYCOL 3350 17 GRAM(S): 17 POWDER, FOR SOLUTION ORAL at 07:25

## 2021-11-10 RX ADMIN — Medication 100 MILLIGRAM(S): at 11:36

## 2021-11-10 RX ADMIN — HEPARIN SODIUM 5000 UNIT(S): 5000 INJECTION INTRAVENOUS; SUBCUTANEOUS at 21:31

## 2021-11-10 RX ADMIN — HYDROMORPHONE HYDROCHLORIDE 0.5 MILLIGRAM(S): 2 INJECTION INTRAMUSCULAR; INTRAVENOUS; SUBCUTANEOUS at 07:44

## 2021-11-10 RX ADMIN — HEPARIN SODIUM 5000 UNIT(S): 5000 INJECTION INTRAVENOUS; SUBCUTANEOUS at 13:17

## 2021-11-10 RX ADMIN — Medication 100 MILLIGRAM(S): at 21:32

## 2021-11-10 RX ADMIN — Medication 20 MILLIGRAM(S): at 06:11

## 2021-11-10 RX ADMIN — SENNA PLUS 2 TABLET(S): 8.6 TABLET ORAL at 21:31

## 2021-11-10 RX ADMIN — Medication 100 MILLIGRAM(S): at 06:12

## 2021-11-10 RX ADMIN — HEPARIN SODIUM 5000 UNIT(S): 5000 INJECTION INTRAVENOUS; SUBCUTANEOUS at 06:11

## 2021-11-10 RX ADMIN — HYDROMORPHONE HYDROCHLORIDE 0.5 MILLIGRAM(S): 2 INJECTION INTRAMUSCULAR; INTRAVENOUS; SUBCUTANEOUS at 08:15

## 2021-11-10 NOTE — PROGRESS NOTE ADULT - SUBJECTIVE AND OBJECTIVE BOX
The patient was seen and examined at bedside.  No acute events overnight and patient is currently without complaints.    T(C): 37.1 (11-10-21 @ 05:36), Max: 37.1 (11-10-21 @ 05:36)  HR: 97 (11-10-21 @ 05:36) (64 - 97)  BP: 137/75 (11-10-21 @ 05:36) (137/75 - 167/85)  RR: 18 (11-10-21 @ 05:36) (18 - 19)  SpO2: 98% (11-10-21 @ 05:36) (98% - 100%)  Wt(kg): --    Physical Exam:    General: NAD, A+Ox3  Abdomen: soft, non-tender, non-distended      11-09 @ 07:01  -  11-10 @ 06:58  --------------------------------------------------------  IN: 550 mL / OUT: 550 mL / NET: 0 mL      [place] - 550cc dark red

## 2021-11-10 NOTE — PROGRESS NOTE ADULT - ASSESSMENT
82 y/o M with PMHx of HTN, aortic aneurysm, and seizure d/o here with gross hematuria. CT scan demonstrated 2cm bladder mass with mild L hydro. H/H 9.2/28.5.     - AM labs  - upsize foey catheter and start CBI this AM   - likely add onto OR for TURBT on Friday  - will need medical clearance  - monitor urine color. hand irrigate PRN  - trend H/H  - Regular diet  - gentle IVF  - Ancef for  ppx given irrigation  - follow up urine culture  - AM labs  - DVT ppx/Incentive Spirometry

## 2021-11-10 NOTE — PATIENT PROFILE ADULT - LEGAL HELP
INDICATION: Follow-up fetal growth.



TECHNIQUE: Multiple real-time grayscale images were obtained over

the gravid uterus.



COMPARISON: 12/13/2019.



FINDINGS: The cervix measures 5 cm in length. The fetus is in

cephalic presentation. Placenta is posteriorly located and there

are no features of abruption or previa. The amount of amniotic

fluid appears visually appropriate and the LUDWIG is normal at 9.7

cm.



Biometrical measurements are as follows:

Biparietal 7.78 cm, age 31 weeks 2 days.

Head circumference 28.18 cm, age 31 weeks 0 days.

Abdominal circumference 23.78 cm, age 28 weeks 1 days.

Femur length 5.48 cm, age 29 weeks 0 days.



Sonographic estimate age: 29 weeks 6 days.

Sonographic estimated date of delivery: 05/02/2020.



Estimated Fetal Weight: 1293 gm (+/- 189  gm).

LMP percentile: 6%.



Fetal heart rate: 146 beats per minute.



Fetal number: 1 of 1.



IMPRESSION: 

1. Single live intrauterine pregnancy with an estimated

gestational age of 29 weeks and 6 days by today's ultrasound.

2. Estimated fetal weight is at the 6th percentile for

gestational age.



Dictated by: 



  Dictated on workstation # [id number] no

## 2021-11-11 ENCOUNTER — TRANSCRIPTION ENCOUNTER (OUTPATIENT)
Age: 81
End: 2021-11-11

## 2021-11-11 LAB
ANION GAP SERPL CALC-SCNC: 16 MMOL/L — SIGNIFICANT CHANGE UP (ref 5–17)
BUN SERPL-MCNC: 40 MG/DL — HIGH (ref 7–23)
CALCIUM SERPL-MCNC: 8.6 MG/DL — SIGNIFICANT CHANGE UP (ref 8.4–10.5)
CHLORIDE SERPL-SCNC: 109 MMOL/L — HIGH (ref 96–108)
CO2 SERPL-SCNC: 16 MMOL/L — LOW (ref 22–31)
CREAT SERPL-MCNC: 1.77 MG/DL — HIGH (ref 0.5–1.3)
GLUCOSE SERPL-MCNC: 102 MG/DL — HIGH (ref 70–99)
HCT VFR BLD CALC: 27.1 % — LOW (ref 39–50)
HGB BLD-MCNC: 9.1 G/DL — LOW (ref 13–17)
MCHC RBC-ENTMCNC: 32.7 PG — SIGNIFICANT CHANGE UP (ref 27–34)
MCHC RBC-ENTMCNC: 33.6 GM/DL — SIGNIFICANT CHANGE UP (ref 32–36)
MCV RBC AUTO: 97.5 FL — SIGNIFICANT CHANGE UP (ref 80–100)
NRBC # BLD: 0 /100 WBCS — SIGNIFICANT CHANGE UP (ref 0–0)
PLATELET # BLD AUTO: 149 K/UL — LOW (ref 150–400)
POTASSIUM SERPL-MCNC: 4.8 MMOL/L — SIGNIFICANT CHANGE UP (ref 3.5–5.3)
POTASSIUM SERPL-SCNC: 4.8 MMOL/L — SIGNIFICANT CHANGE UP (ref 3.5–5.3)
RBC # BLD: 2.78 M/UL — LOW (ref 4.2–5.8)
RBC # FLD: 13.4 % — SIGNIFICANT CHANGE UP (ref 10.3–14.5)
SARS-COV-2 RNA SPEC QL NAA+PROBE: SIGNIFICANT CHANGE UP
SODIUM SERPL-SCNC: 141 MMOL/L — SIGNIFICANT CHANGE UP (ref 135–145)
WBC # BLD: 8.59 K/UL — SIGNIFICANT CHANGE UP (ref 3.8–10.5)
WBC # FLD AUTO: 8.59 K/UL — SIGNIFICANT CHANGE UP (ref 3.8–10.5)

## 2021-11-11 PROCEDURE — 71045 X-RAY EXAM CHEST 1 VIEW: CPT | Mod: 26

## 2021-11-11 PROCEDURE — 93010 ELECTROCARDIOGRAM REPORT: CPT

## 2021-11-11 PROCEDURE — 93306 TTE W/DOPPLER COMPLETE: CPT | Mod: 26

## 2021-11-11 RX ADMIN — HEPARIN SODIUM 5000 UNIT(S): 5000 INJECTION INTRAVENOUS; SUBCUTANEOUS at 05:32

## 2021-11-11 RX ADMIN — Medication 100 MILLIGRAM(S): at 05:32

## 2021-11-11 RX ADMIN — PRIMIDONE 250 MILLIGRAM(S): 250 TABLET ORAL at 12:11

## 2021-11-11 RX ADMIN — Medication 100 MILLIGRAM(S): at 12:12

## 2021-11-11 RX ADMIN — Medication 20 MILLIGRAM(S): at 05:32

## 2021-11-11 RX ADMIN — Medication 100 MILLIGRAM(S): at 15:44

## 2021-11-11 RX ADMIN — LOSARTAN POTASSIUM 50 MILLIGRAM(S): 100 TABLET, FILM COATED ORAL at 05:32

## 2021-11-11 RX ADMIN — HEPARIN SODIUM 5000 UNIT(S): 5000 INJECTION INTRAVENOUS; SUBCUTANEOUS at 21:26

## 2021-11-11 RX ADMIN — Medication 100 MILLIGRAM(S): at 21:26

## 2021-11-11 RX ADMIN — HEPARIN SODIUM 5000 UNIT(S): 5000 INJECTION INTRAVENOUS; SUBCUTANEOUS at 15:43

## 2021-11-11 RX ADMIN — SODIUM CHLORIDE 50 MILLILITER(S): 9 INJECTION INTRAMUSCULAR; INTRAVENOUS; SUBCUTANEOUS at 05:32

## 2021-11-11 NOTE — PROGRESS NOTE ADULT - SUBJECTIVE AND OBJECTIVE BOX
UROLOGY DAILY PROGRESS NOTE:     Subjective: Patient seen and examined at bedside. No overnight events.       Objective:  Vital signs  T(F): , Max: 98.7 (11-10-21 @ 13:16)  HR: 98 (11-11-21 @ 05:17)  BP: 148/89 (11-11-21 @ 05:17)  SpO2: 98% (11-11-21 @ 05:17)  Wt(kg): --    I&O's Summary    09 Nov 2021 07:01  -  10 Nov 2021 07:00  --------------------------------------------------------  IN: 550 mL / OUT: 550 mL / NET: 0 mL    10 Nov 2021 07:01  -  11 Nov 2021 05:40  --------------------------------------------------------  IN: 1940 mL / OUT: 0 mL / NET: 1940 mL        Gen: NAD  Pulm: No respiratory distress, no subcostal retractions  CV: RRR, no JVD  Abd: Soft, NT, ND  :    Labs:  11-10  6.57  / 27.1  /x      11-10  x     / x     /1.51                           8.9    6.57  )-----------( 143      ( 10 Nov 2021 07:25 )             27.1     11-10    142  |  109<H>  |  33<H>  ----------------------------<  88  4.8   |  18<L>  |  1.51<H>    Ca    8.3<L>      10 Nov 2021 07:24    TPro  6.8  /  Alb  4.2  /  TBili  0.2  /  DBili  x   /  AST  23  /  ALT  18  /  AlkPhos  80  11-09    PT/INR - ( 09 Nov 2021 15:08 )   PT: 14.7 sec;   INR: 1.24 ratio         PTT - ( 09 Nov 2021 15:08 )  PTT:42.0 sec    Urine Cx:   UROLOGY DAILY PROGRESS NOTE:     Subjective: Patient seen and examined at bedside. No overnight events. Feeling well. No acute complaints.     Objective:  Vital signs  T(F): , Max: 98.7 (11-10-21 @ 13:16)  HR: 98 (11-11-21 @ 05:17)  BP: 148/89 (11-11-21 @ 05:17)  SpO2: 98% (11-11-21 @ 05:17)    I&O's Summary    09 Nov 2021 07:01  -  10 Nov 2021 07:00  --------------------------------------------------------  IN: 550 mL / OUT: 550 mL / NET: 0 mL    10 Nov 2021 07:01  -  11 Nov 2021 05:40  --------------------------------------------------------  IN: 1940 mL / OUT: 0 mL / NET: 1940 mL        Gen: NAD  Pulm: No respiratory distress, no subcostal retractions  Abd: Soft, NT, ND  : no suprapubic tenderness. on CBI mod gtt - clear output    Labs:  11-10  6.57  / 27.1  /x      11-10  x     / x     /1.51                           8.9    6.57  )-----------( 143      ( 10 Nov 2021 07:25 )             27.1     11-10    142  |  109<H>  |  33<H>  ----------------------------<  88  4.8   |  18<L>  |  1.51<H>    Ca    8.3<L>      10 Nov 2021 07:24    TPro  6.8  /  Alb  4.2  /  TBili  0.2  /  DBili  x   /  AST  23  /  ALT  18  /  AlkPhos  80  11-09    PT/INR - ( 09 Nov 2021 15:08 )   PT: 14.7 sec;   INR: 1.24 ratio    PTT - ( 09 Nov 2021 15:08 )  PTT:42.0 sec    Urine Cx: Culture - Urine (11.09.21 @ 14:45)    Specimen Source: Clean Catch Clean Catch (Midstream)    Culture Results:   <10,000 CFU/mL Normal Urogenital Kierra

## 2021-11-11 NOTE — CHART NOTE - NSCHARTNOTEFT_GEN_A_CORE
Urology Preop Note    Diagnosis: bladder tumor  Procedure: TURBT  Surgeon: Penny                          9.1    8.59  )-----------( 149      ( 11 Nov 2021 08:00 )             27.1       11-11    141  |  109<H>  |  40<H>  ----------------------------<  102<H>  4.8   |  16<L>  |  1.77<H>    Ca    8.6      11 Nov 2021 08:00      UCx: Culture - Urine (11.09.21 @ 14:45)    Specimen Source: Clean Catch Clean Catch (Midstream)    Culture Results:   <10,000 CFU/mL Normal Urogenital Kierra      EKG: < from: 12 Lead ECG (11.11.21 @ 06:36) >      Ventricular Rate 86 BPM    Atrial Rate 86 BPM    P-R Interval 182 ms    QRS Duration 74 ms    Q-T Interval 382 ms    QTC Calculation(Bazett) 457 ms    P Axis 31 degrees    R Axis 3 degrees    T Axis 36 degrees    Diagnosis Line NORMAL SINUS RHYTHM    WHEN COMPARED WITH ECG OF 09-NOV-2021 16:07,  NO SIGNIFICANT CHANGE WAS FOUND  Confirmed by МАРИНА SYED, LARRY REYES ([id number]) on 11/11/2021 3:00:11 PM    < end of copied text >    < from: Transthoracic Echocardiogram (11.11.21 @ 14:17) >    Patient name: DULCE JOHNSON  YOB: 1940   Age: 81 (M)   MR#: [id number]  Study Date: 11/11/2021  Location: [address] [place], [place] [id number]: [facility]  Study quality: Technically fair  Referring Physician: Fabio Francis MD  Blood Pressure: 147/89 mmHg  Height: 163 cm  Weight: 73 kg  BSA: 1.8 m2  Heart Rate: 87 mmHg  ------------------------------------------------------------------------  PROCEDURE: Transthoracic echocardiogram with 2-D, M-Mode  and complete spectral and color flow Doppler.  INDICATION: Abnormal electrocardiogram (ECG) (EKG) (R94.31)  ------------------------------------------------------------------------  Dimensions:    Normal Values:  LA:     3.2    2.0 - 4.0 cm  Ao:     3.6    2.0 - 3.8 cm  SEPTUM: 1.1    0.6 - 1.2 cm  PWT:    1.1    0.6 - 1.1 cm  LVIDd:  4.2    3.0 - 5.6 cm  LVIDs:  2.5    1.8 - 4.0 cm  Derived variables:  LVMI: 88 g/m2  RWT: 0.52  Fractional short: 40 %  EF (Hammond Rule): 69 %Doppler Peak Velocity (m/sec):  AoV=2.4  ------------------------------------------------------------------------  Observations:  Mitral Valve: Mitral annular calcification, otherwise  normal mitral valve. Minimal mitral regurgitation.  Aortic Valve/Aorta: Calcified trileaflet aortic valve with  normal opening. Peak transaortic valve gradient equals 23  mm Hg, mean transaortic valve gradient equals 12 mm Hg,  aortic valve velocity time integral equals 41 cm, estimated  aortic valve area equals 2.1 sqcm. Mild aortic  regurgitation.  Peak left ventricular outflow tract  gradient equals 8 mm Hg, mean gradient is equal to 4 mm Hg,  LVOT velocity time integral equals 25 cm.  Aortic Root: 3.6 cm.  S/P aoritc dissection repair in 2013.  Left Atrium: Normal left atrium.  LA volume index = 19  cc/m2.  Left Ventricle: Normal left ventricular systolic function.  No segmental wall motion abnormalities. Mild concentric  left ventricular hypertrophy. Indeterminate diastolic  function.  Right Heart: Normal right atrium. Normal right ventricular  size and function. Normal tricuspid valve. Mild tricuspid  regurgitation. Pulmonic valve not well visualized, probably  normal. No pulmonic regurgitation.  Pericardium/Pleura: Normal pericardium with no pericardial  effusion.  Hemodynamic: Estimated right ventricular systolic pressure  equals 27.64 - 32.64 mm Hg, assuming right atrial pressure  equals 10 - 15 mm Hg, consistent with normal pulmonary  hypertension.  ------------------------------------------------------------------------  Conclusions:  1. Mitral annular calcification, otherwise normal mitral  valve. Minimal mitral regurgitation.  2. Calcified trileaflet aortic valve with normal opening.  Mild aortic regurgitation.  3. Normal left ventricular systolic function. No segmental  wall motion abnormalities.  4. Normal right ventricular size and function.  *** Compared with echocardiogram of 10/29/2013, no  significant changes noted.  ------------------------------------------------------------------------  Confirmed on  11/11/2021 - 15:55:48 by Maninder Santizo M.D.  ------------------------------------------------------------------------    < end of copied text >    CXR:  pending    Orders:  NPO after midnight  IVF after midnight   Consent to be obtained  Clearance from medicine  Type & Screen  Anti-coagulation held

## 2021-11-11 NOTE — PROGRESS NOTE ADULT - ASSESSMENT
80 y/o M with PMHx of HTN, aortic aneurysm, and seizure d/o here with gross hematuria. CT scan demonstrated 2cm bladder mass with mild L hydro. H/H 9.2/28.5.     - AM labs  - continue CBI  - likely add onto OR for TURBT on Friday  - will need medical clearance  - monitor urine color. hand irrigate PRN  - trend H/H  - Regular diet  - gentle IVF  - Ancef for  ppx given irrigation  - follow up urine culture  - AM labs  - DVT ppx/Incentive Spirometry   82 y/o M with PMHx of HTN, aortic aneurysm, and seizure d/o here with gross hematuria. CT scan demonstrated 2cm bladder mass with mild L hydro.     - AM labs  - continue CBI  - OR for TURBT tomorrow  - NPOpMN  - needs preop/consent  - will need medical clearance, Dr. Liriano called  - monitor urine color. hand irrigate PRN  - trend H/H  - Regular diet  - gentle IVF  - Ancef for  ppx given CBI  - follow up urine culture  - AM labs  - DVT ppx /Incentive Spirometry

## 2021-11-12 ENCOUNTER — RESULT REVIEW (OUTPATIENT)
Age: 81
End: 2021-11-12

## 2021-11-12 LAB
ANION GAP SERPL CALC-SCNC: 12 MMOL/L — SIGNIFICANT CHANGE UP (ref 5–17)
BUN SERPL-MCNC: 39 MG/DL — HIGH (ref 7–23)
CALCIUM SERPL-MCNC: 8.3 MG/DL — LOW (ref 8.4–10.5)
CHLORIDE SERPL-SCNC: 116 MMOL/L — HIGH (ref 96–108)
CO2 SERPL-SCNC: 15 MMOL/L — LOW (ref 22–31)
CREAT SERPL-MCNC: 1.76 MG/DL — HIGH (ref 0.5–1.3)
GLUCOSE SERPL-MCNC: 104 MG/DL — HIGH (ref 70–99)
HCT VFR BLD CALC: 26.7 % — LOW (ref 39–50)
HGB BLD-MCNC: 8.6 G/DL — LOW (ref 13–17)
MCHC RBC-ENTMCNC: 31.9 PG — SIGNIFICANT CHANGE UP (ref 27–34)
MCHC RBC-ENTMCNC: 32.2 GM/DL — SIGNIFICANT CHANGE UP (ref 32–36)
MCV RBC AUTO: 98.9 FL — SIGNIFICANT CHANGE UP (ref 80–100)
NRBC # BLD: 0 /100 WBCS — SIGNIFICANT CHANGE UP (ref 0–0)
PLATELET # BLD AUTO: 167 K/UL — SIGNIFICANT CHANGE UP (ref 150–400)
POTASSIUM SERPL-MCNC: 4.9 MMOL/L — SIGNIFICANT CHANGE UP (ref 3.5–5.3)
POTASSIUM SERPL-SCNC: 4.9 MMOL/L — SIGNIFICANT CHANGE UP (ref 3.5–5.3)
RBC # BLD: 2.7 M/UL — LOW (ref 4.2–5.8)
RBC # FLD: 13.8 % — SIGNIFICANT CHANGE UP (ref 10.3–14.5)
SODIUM SERPL-SCNC: 143 MMOL/L — SIGNIFICANT CHANGE UP (ref 135–145)
WBC # BLD: 7.23 K/UL — SIGNIFICANT CHANGE UP (ref 3.8–10.5)
WBC # FLD AUTO: 7.23 K/UL — SIGNIFICANT CHANGE UP (ref 3.8–10.5)

## 2021-11-12 PROCEDURE — 88112 CYTOPATH CELL ENHANCE TECH: CPT | Mod: 26

## 2021-11-12 PROCEDURE — 52235 CYSTOSCOPY AND TREATMENT: CPT

## 2021-11-12 PROCEDURE — 88305 TISSUE EXAM BY PATHOLOGIST: CPT | Mod: 26

## 2021-11-12 PROCEDURE — 99231 SBSQ HOSP IP/OBS SF/LOW 25: CPT

## 2021-11-12 RX ORDER — ONDANSETRON 8 MG/1
4 TABLET, FILM COATED ORAL ONCE
Refills: 0 | Status: DISCONTINUED | OUTPATIENT
Start: 2021-11-12 | End: 2021-11-12

## 2021-11-12 RX ORDER — FUROSEMIDE 40 MG
20 TABLET ORAL DAILY
Refills: 0 | Status: DISCONTINUED | OUTPATIENT
Start: 2021-11-12 | End: 2021-11-13

## 2021-11-12 RX ORDER — HEPARIN SODIUM 5000 [USP'U]/ML
5000 INJECTION INTRAVENOUS; SUBCUTANEOUS EVERY 8 HOURS
Refills: 0 | Status: DISCONTINUED | OUTPATIENT
Start: 2021-11-12 | End: 2021-11-13

## 2021-11-12 RX ORDER — ACETAMINOPHEN 500 MG
975 TABLET ORAL EVERY 6 HOURS
Refills: 0 | Status: DISCONTINUED | OUTPATIENT
Start: 2021-11-12 | End: 2021-11-13

## 2021-11-12 RX ORDER — CEFAZOLIN SODIUM 1 G
1000 VIAL (EA) INJECTION EVERY 8 HOURS
Refills: 0 | Status: DISCONTINUED | OUTPATIENT
Start: 2021-11-12 | End: 2021-11-13

## 2021-11-12 RX ORDER — SODIUM CHLORIDE 9 MG/ML
1000 INJECTION INTRAMUSCULAR; INTRAVENOUS; SUBCUTANEOUS
Refills: 0 | Status: DISCONTINUED | OUTPATIENT
Start: 2021-11-12 | End: 2021-11-12

## 2021-11-12 RX ORDER — LOSARTAN POTASSIUM 100 MG/1
50 TABLET, FILM COATED ORAL DAILY
Refills: 0 | Status: DISCONTINUED | OUTPATIENT
Start: 2021-11-12 | End: 2021-11-13

## 2021-11-12 RX ORDER — SENNA PLUS 8.6 MG/1
2 TABLET ORAL AT BEDTIME
Refills: 0 | Status: DISCONTINUED | OUTPATIENT
Start: 2021-11-12 | End: 2021-11-13

## 2021-11-12 RX ORDER — PRIMIDONE 250 MG/1
250 TABLET ORAL DAILY
Refills: 0 | Status: DISCONTINUED | OUTPATIENT
Start: 2021-11-12 | End: 2021-11-13

## 2021-11-12 RX ORDER — ONDANSETRON 8 MG/1
4 TABLET, FILM COATED ORAL EVERY 6 HOURS
Refills: 0 | Status: DISCONTINUED | OUTPATIENT
Start: 2021-11-12 | End: 2021-11-13

## 2021-11-12 RX ORDER — FUROSEMIDE 40 MG
20 TABLET ORAL ONCE
Refills: 0 | Status: COMPLETED | OUTPATIENT
Start: 2021-11-12 | End: 2021-11-12

## 2021-11-12 RX ORDER — FENTANYL CITRATE 50 UG/ML
25 INJECTION INTRAVENOUS
Refills: 0 | Status: DISCONTINUED | OUTPATIENT
Start: 2021-11-12 | End: 2021-11-12

## 2021-11-12 RX ORDER — SODIUM CHLORIDE 9 MG/ML
1000 INJECTION, SOLUTION INTRAVENOUS
Refills: 0 | Status: DISCONTINUED | OUTPATIENT
Start: 2021-11-12 | End: 2021-11-12

## 2021-11-12 RX ADMIN — Medication 20 MILLIGRAM(S): at 18:15

## 2021-11-12 RX ADMIN — Medication 100 MILLIGRAM(S): at 05:26

## 2021-11-12 RX ADMIN — HEPARIN SODIUM 5000 UNIT(S): 5000 INJECTION INTRAVENOUS; SUBCUTANEOUS at 21:04

## 2021-11-12 RX ADMIN — Medication 100 MILLIGRAM(S): at 18:15

## 2021-11-12 RX ADMIN — HEPARIN SODIUM 5000 UNIT(S): 5000 INJECTION INTRAVENOUS; SUBCUTANEOUS at 05:26

## 2021-11-12 RX ADMIN — Medication 20 MILLIGRAM(S): at 05:26

## 2021-11-12 RX ADMIN — LOSARTAN POTASSIUM 50 MILLIGRAM(S): 100 TABLET, FILM COATED ORAL at 05:25

## 2021-11-12 RX ADMIN — PRIMIDONE 250 MILLIGRAM(S): 250 TABLET ORAL at 12:36

## 2021-11-12 RX ADMIN — Medication 100 MILLIGRAM(S): at 21:04

## 2021-11-12 NOTE — PROGRESS NOTE ADULT - SUBJECTIVE AND OBJECTIVE BOX
HPI:  HPI: 80 y/o M with PMHx of HTN, aortic aneurysm, and seizure d/o presents to the ED with complaint of gross hematuria. Pt has had gross hematuria over the past 1-2 weeks. Followed up outpatient with PCP who sent for renal/bladder US (which son reported was normal) and discussed to have pt follow up with urologist, but has not made appointment yet. Last night, urine color got much darker. Has been able to void. Had three separate incidences of clots, but not with each void. Feels like he empties his bladder after he is done voiding. Has not had gross hematuria before. Not on AC or blood thinning medication. Denies fever/chills, chest pain, shortness of breath, abd pain, N/V, suprapubic pain, flank pain, difficulty voiding, dysuria or other acute complaint at this time.  Pt has a remote tobacco use history, quit 30 years ago, but smoked for roughly 1/2ppd for approx. 7-8 years. 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 No family history of cancer. No personal history of cancer. No previous  history. (09 Nov 2021 20:28)      PAST MEDICAL & SURGICAL HISTORY:  Hypertension    Seizure        Review of Systems:   CONSTITUTIONAL: No fever, weight loss, or fatigue  EYES: No eye pain, visual disturbances, or discharge  ENMT:  No difficulty hearing, tinnitus, vertigo; No sinus or throat pain  NECK: No pain or stiffness  BREASTS: No pain, masses, or nipple discharge  RESPIRATORY: No cough, wheezing, chills or hemoptysis; No shortness of breath  CARDIOVASCULAR: No chest pain, palpitations, dizziness, or leg swelling  GASTROINTESTINAL: No abdominal or epigastric pain. No nausea, vomiting, or hematemesis; No diarrhea or constipation. No melena or hematochezia.  GENITOURINARY: No dysuria, frequency, hematuria, or incontinence  NEUROLOGICAL: No headaches, memory loss, loss of strength, numbness, or tremors  SKIN: No itching, burning, rashes, or lesions   LYMPH NODES: No enlarged glands  ENDOCRINE: No heat or cold intolerance; No hair loss  MUSCULOSKELETAL: No joint pain or swelling; No muscle, back, or extremity pain  PSYCHIATRIC: No depression, anxiety, mood swings, or difficulty sleeping  HEME/LYMPH: No easy bruising, or bleeding gums  ALLERY AND IMMUNOLOGIC: No hives or eczema    Allergies    No Known Allergies    Intolerances        Social History:     FAMILY HISTORY:      MEDICATIONS  (STANDING):  ceFAZolin   IVPB 1000 milliGRAM(s) IV Intermittent every 8 hours  furosemide    Tablet 20 milliGRAM(s) Oral daily  heparin   Injectable 5000 Unit(s) SubCutaneous every 8 hours  influenza  Vaccine (HIGH DOSE) 0.7 milliLiter(s) IntraMuscular once  losartan 50 milliGRAM(s) Oral daily  phenytoin   Capsule 100 milliGRAM(s) Oral daily  primidone 250 milliGRAM(s) Oral daily  propranolol 20 milliGRAM(s) Oral two times a day  sodium chloride 0.9%. 1000 milliLiter(s) (50 mL/Hr) IV Continuous <Continuous>    MEDICATIONS  (PRN):  acetaminophen     Tablet .. 975 milliGRAM(s) Oral every 6 hours PRN Temp greater or equal to 38C (100.4F), Mild Pain (1 - 3)  ondansetron Injectable 4 milliGRAM(s) IV Push every 6 hours PRN Nausea  senna 2 Tablet(s) Oral at bedtime PRN Constipation        CAPILLARY BLOOD GLUCOSE        PHYSICAL EXAM:  GENERAL: NAD, well-developed  HEAD:  Atraumatic, Normocephalic  EYES: EOMI, conjunctiva and sclera clear  NECK: Supple, No JVD  CHEST/LUNG: Clear to auscultation bilaterally; No wheeze  HEART: Regular rate and rhythm; No murmurs, rubs, or gallops  ABDOMEN: Soft, Nontender, Nondistended; Bowel sounds present  EXTREMITIES:  2+ Peripheral Pulses, No clubbing, cyanosis, or edema  PSYCH: AAOx3  NEUROLOGY: non-focal  SKIN: No rashes or lesions    LABS:                        9.1    8.59  )-----------( 149      ( 11 Nov 2021 08:00 )             27.1     11-11    141  |  109<H>  |  40<H>  ----------------------------<  102<H>  4.8   |  16<L>  |  1.77<H>    Ca    8.6      11 Nov 2021 08:00                RADIOLOGY & ADDITIONAL TESTS:    Imaging Personally Reviewed:    Consultant(s) Notes Reviewed:      Care Discussed with Consultants/Other Providers:

## 2021-11-12 NOTE — PROGRESS NOTE ADULT - ASSESSMENT
82 y/o M with PMHx of HTN, aortic aneurysm, and seizure d/o presents to the ED with complaint of gross hematuria noted to have 2cm bladder mass with mild L hydro plan for TUBT today     Pre op medical evaluation - Patient s/p TURBT today.   Follow up path results   Patient moderate risk for moderate risk procedure medically optimized.     Hematuria Urology following   Bladder irrigation prn.   Monitor CBC.     HTN Controlled off meds     AUSTEN on CKD Renal consult appreciated.   Recommend Bicarb drip.

## 2021-11-12 NOTE — PROGRESS NOTE ADULT - ASSESSMENT
A/P: 81y Male s/p TURBT   DVT prophylaxis/OOB  Incentive spirometry  Strict I&O's  Analgesia and antiemetics as needed  Diet-regular   AM labs  2uPRBC with lasix in between   CBI overnight, wean as tolerated

## 2021-11-12 NOTE — PROGRESS NOTE ADULT - SUBJECTIVE AND OBJECTIVE BOX
UROLOGY DAILY PROGRESS NOTE:     Subjective: Patient seen and examined at bedside. No overnight events.       Objective:  Vital signs  T(F): , Max: 98.7 (11-12-21 @ 01:11)  HR: 85 (11-12-21 @ 05:05)  BP: 165/68 (11-12-21 @ 05:05)  SpO2: 97% (11-12-21 @ 05:05)  Wt(kg): --    I&O's Summary    10 Nov 2021 07:01  -  11 Nov 2021 07:00  --------------------------------------------------------  IN: 2260 mL / OUT: 0 mL / NET: 2260 mL    11 Nov 2021 07:01  -  12 Nov 2021 06:46  --------------------------------------------------------  IN: 1840 mL / OUT: 0 mL / NET: 1840 mL        Gen: NAD  Pulm: No respiratory distress, no subcostal retractions  CV: RRR, no JVD  Abd: Soft, NT, ND  : CBI- clear     Labs:  11-12  7.23  / 26.7  /1.76   11-11  8.59  / 27.1  /1.77                           8.6    7.23  )-----------( 167      ( 12 Nov 2021 05:18 )             26.7     11-12    143  |  116<H>  |  39<H>  ----------------------------<  104<H>  4.9   |  15<L>  |  1.76<H>    Ca    8.3<L>      12 Nov 2021 05:18          Urine Cx:  Culture - Urine (11.09.21 @ 14:45)    Specimen Source: Clean Catch Clean Catch (Midstream)    Culture Results:   <10,000 CFU/mL Normal Urogenital Kierra

## 2021-11-12 NOTE — CONSULT NOTE ADULT - ASSESSMENT
82 y/o M with PMHx of HTN, aortic aneurysm, and seizure d/o presents to the ED with complaint of gross hematuria sp TURBT on CBI   Likely metabolic acidosis     1 Renal - Unclear if this is CKD stage 3 or arcadio.  In any event will change IVF to 1/2 NS with 75 meq of nahco3/liter   At some point will check UA   2 -SP TURBT and awaiting path   3 CVS-If the SBP remains high then start Norvasc 5mg po qd;  Losartan is a weak BP medication     Sayed [facility]   [phone]   
82 y/o M with PMHx of HTN, aortic aneurysm, and seizure d/o here with gross hematuria. CT scan demonstrated 2cm bladder mass with mild L hydro. H/H 9.2/28.5.     Recs:    PLAN PENDING
82 y/o M with PMHx of HTN, aortic aneurysm, and seizure d/o presents to the ED with complaint of gross hematuria noted to have 2cm bladder mass with mild L hydro plan for TUBT on Friday .    Pre op medical evaluation - Patient fro TURBT am tomorrow   Patient moderate risk for moderate risk procedure medically optimized.     Hematuria Urology following   Bladder irrigation prn.   Monitor CBC.     HTN Controlled off meds

## 2021-11-12 NOTE — CONSULT NOTE ADULT - SUBJECTIVE AND OBJECTIVE BOX
HPI:  HPI: 80 y/o M with PMHx of HTN, aortic aneurysm, and seizure d/o presents to the ED with complaint of gross hematuria. Pt has had gross hematuria over the past 1-2 weeks. Followed up outpatient with PCP who sent for renal/bladder US (which son reported was normal) and discussed to have pt follow up with urologist, but has not made appointment yet. Last night, urine color got much darker. Has been able to void. Had three separate incidences of clots, but not with each void. Feels like he empties his bladder after he is done voiding. Has not had gross hematuria before. Not on AC or blood thinning medication. Denies fever/chills, chest pain, shortness of breath, abd pain, N/V, suprapubic pain, flank pain, difficulty voiding, dysuria or other acute complaint at this time.  Pt has a remote tobacco use history, quit 30 years ago, but smoked for roughly 1/2ppd for approx. 7-8 years. 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 No family history of cancer. No personal history of cancer. No previous  history. (09 Nov 2021 20:28)      PAST MEDICAL & SURGICAL HISTORY:  Hypertension    Seizure        Review of Systems:   CONSTITUTIONAL: No fever, weight loss, or fatigue  EYES: No eye pain, visual disturbances, or discharge  ENMT:  No difficulty hearing, tinnitus, vertigo; No sinus or throat pain  NECK: No pain or stiffness  BREASTS: No pain, masses, or nipple discharge  RESPIRATORY: No cough, wheezing, chills or hemoptysis; No shortness of breath  CARDIOVASCULAR: No chest pain, palpitations, dizziness, or leg swelling  GASTROINTESTINAL: No abdominal or epigastric pain. No nausea, vomiting, or hematemesis; No diarrhea or constipation. No melena or hematochezia.  GENITOURINARY: No dysuria, frequency, hematuria, or incontinence  NEUROLOGICAL: No headaches, memory loss, loss of strength, numbness, or tremors  SKIN: No itching, burning, rashes, or lesions   LYMPH NODES: No enlarged glands  ENDOCRINE: No heat or cold intolerance; No hair loss  MUSCULOSKELETAL: No joint pain or swelling; No muscle, back, or extremity pain  PSYCHIATRIC: No depression, anxiety, mood swings, or difficulty sleeping  HEME/LYMPH: No easy bruising, or bleeding gums  ALLERY AND IMMUNOLOGIC: No hives or eczema    Allergies    No Known Allergies    Intolerances        Social History:     FAMILY HISTORY:      MEDICATIONS  (STANDING):  ceFAZolin   IVPB 1000 milliGRAM(s) IV Intermittent every 8 hours  furosemide    Tablet 20 milliGRAM(s) Oral daily  heparin   Injectable 5000 Unit(s) SubCutaneous every 8 hours  influenza  Vaccine (HIGH DOSE) 0.7 milliLiter(s) IntraMuscular once  losartan 50 milliGRAM(s) Oral daily  phenytoin   Capsule 100 milliGRAM(s) Oral daily  primidone 250 milliGRAM(s) Oral daily  propranolol 20 milliGRAM(s) Oral two times a day  sodium chloride 0.9%. 1000 milliLiter(s) (50 mL/Hr) IV Continuous <Continuous>    MEDICATIONS  (PRN):  acetaminophen     Tablet .. 975 milliGRAM(s) Oral every 6 hours PRN Temp greater or equal to 38C (100.4F), Mild Pain (1 - 3)  ondansetron Injectable 4 milliGRAM(s) IV Push every 6 hours PRN Nausea  senna 2 Tablet(s) Oral at bedtime PRN Constipation        CAPILLARY BLOOD GLUCOSE        I&O's Summary    10 Nov 2021 07:01  -  11 Nov 2021 07:00  --------------------------------------------------------  IN: 2260 mL / OUT: 0 mL / NET: 2260 mL    11 Nov 2021 07:01  -  11 Nov 2021 17:22  --------------------------------------------------------  IN: 600 mL / OUT: 0 mL / NET: 600 mL        PHYSICAL EXAM:  GENERAL: NAD, well-developed  HEAD:  Atraumatic, Normocephalic  EYES: EOMI, PERRLA, conjunctiva and sclera clear  NECK: Supple, No JVD  CHEST/LUNG: Clear to auscultation bilaterally; No wheeze  HEART: Regular rate and rhythm; No murmurs, rubs, or gallops  ABDOMEN: Soft, Nontender, Nondistended; Bowel sounds present  EXTREMITIES:  2+ Peripheral Pulses, No clubbing, cyanosis, or edema  PSYCH: AAOx3  NEUROLOGY: non-focal  SKIN: No rashes or lesions    LABS:                        9.1    8.59  )-----------( 149      ( 11 Nov 2021 08:00 )             27.1     11-11    141  |  109<H>  |  40<H>  ----------------------------<  102<H>  4.8   |  16<L>  |  1.77<H>    Ca    8.6      11 Nov 2021 08:00                RADIOLOGY & ADDITIONAL TESTS:    Imaging Personally Reviewed:    Consultant(s) Notes Reviewed:      Care Discussed with Consultants/Other Providers:  
HPI: 82 y/o M with PMHx of HTN, aortic aneurysm, and seizure d/o presents to the ED with complaint of gross hematuria. Pt has had gross hematuria over the past 1-2 weeks. Followed up outpatient with PCP who sent for renal/bladder US (which son reported was normal) and discussed to have pt follow up with urologist, but has not made appointment yet. Last night, urine color got much darker. Has been able to void. Had three separate incidences of clots, but not with each void. Feels like he empties his bladder after he is done voiding. Has not had gross hematuria before. Not on AC or blood thinning medication. Denies fever/chills, chest pain, shortness of breath, abd pain, N/V, suprapubic pain, flank pain, difficulty voiding, dysuria or other acute complaint at this time.  Pt has a remote tobacco use history, quit 30 years ago, but smoked for roughly 1/2ppd for approx. 7-8 years. 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 No family history of cancer. No personal history of cancer. No previous  history.    PAST MEDICAL & SURGICAL HISTORY:  Hypertension  Seizure    MEDICATIONS  (STANDING):    MEDICATIONS  (PRN):      FAMILY HISTORY: no FHx of malignancy    Allergies    No Known Allergies    Intolerances    SOCIAL HISTORY: remote tobacco use history, quit 30 years ago, but smoked for roughly 1/2ppd for approx. 7-8 years.    REVIEW OF SYSTEMS: Otherwise negative as stated in HPI    Physical Exam  Vital signs  T(C): 36.8 (21 @ 09:59), Max: 36.8 (21 @ 09:59)  HR: 77 (21 @ 14:06)  BP: 158/81 (21 @ 14:06)  SpO2: 100% (21 @ 14:06)    Output    Gen: NAD  Pulm: No respiratory distress  Abd: soft, nontender, nondistended. no rebound or guarding  Back: no CVAT b/l  : no suprapubic tenderness. uncirc penis. small stain of hematuria on underwear. no blood or clots at urethral meatus. testes descended b/l and nontender. voided urine cranberry, no clots. PVR 250cc.       LABS:                  9.2    5.66  )-----------( 137      ( 2021 16:19 )             28.5         139  |  110<H>  |  31<H>  ----------------------------<  102<H>  5.1   |  17<L>  |  1.52<H>    Ca    8.5      2021 12:09    TPro  6.8  /  Alb  4.2  /  TBili  0.2  /  DBili  x   /  AST  23  /  ALT  18  /  AlkPhos  80      PT/INR - ( 2021 15:08 )   PT: 14.7 sec;   INR: 1.24 ratio         PTT - ( 2021 15:08 )  PTT:42.0 sec  Urinalysis Basic - ( 2021 11:53 )  Color: Red / Appearance: Turbid / S.007 / pH: x  Gluc: x / Ketone: Negative  / Bili: Negative / Urobili: Negative   Blood: x / Protein: 100 mg/dL / Nitrite: Negative   Leuk Esterase: Negative / RBC: >50 /hpf / WBC 0 /HPF   Sq Epi: x / Non Sq Epi: 0 / Bacteria: Negative    Urine Cx: pending    RADIOLOGY:  < from: CT Abdomen and Pelvis No Cont (21 @ 12:57) >  EXAM:  CT ABDOMEN AND PELVIS                          PROCEDURE DATE:  2021      INTERPRETATION:  CLINICAL INFORMATION: eval for renal stone    COMPARISON: 10.11.13.    CONTRAST/COMPLICATIONS:  IV Contrast: None  Oral Contrast: None  Complications: None    PROCEDURE:  CT of the Abdomen and Pelvis was performed.  Sagittal and coronal reformats were performed.    FINDINGS:    LOWER CHEST: Patchy airspace opacity in the medial right lower lobe is noted.    LIVER: Within normal limits.  BILE DUCTS: Normal caliber.  GALLBLADDER: Within normal limits.  SPLEEN: Within normal limits.  PANCREAS: Within normal limits.  ADRENALS: Stable nodularity of the left adrenal gland.  KIDNEYS/URETERS: Punctate right renal calculi. Parenchymal and parapelvic cysts. Mild left hydronephrosis.    BLADDER: A 2 cm left posterior bladder mass is noted.  REPRODUCTIVE ORGANS: Within normal limits.    BOWEL: No bowel obstruction.  PERITONEUM: No ascites.  VESSELS:  Dissection involving the thoracoabdominal aorta is noted. The descending thoracic aorta measures 4.7 cm previously 2.5 cm.  RETROPERITONEUM/LYMPH NODES: No lymphadenopathy.  ABDOMINAL WALL: Within normal limits.  BONES: Within normal limits.    IMPRESSION: Aortic dissection again noted. Aneurysm of the descending thoracic aorta is increased in size.    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    Medial right lung opacity is indeterminate; recommend one-month follow-up chest CT.    --- End of Report ---    MITRA JUAREZ MD; Attending Radiologist    < end of copied text >    
NEPHROLOGY - NSN    Patient seen and examined.      HPI: 80 y/o M with PMHx of HTN, aortic aneurysm, and seizure d/o presents to the ED with complaint of gross hematuria. Pt has had gross hematuria over the past 1-2 weeks. Followed up outpatient with PCP who sent for renal/bladder US (which son reported was normal) and discussed to have pt follow up with urologist, but has not made appointment yet. Last night, urine color got much darker. Has been able to void. Had three separate incidences of clots, but not with each void. Feels like he empties his bladder after he is done voiding. Has not had gross hematuria before. Not on AC or blood thinning medication. Denies fever/chills, chest pain, shortness of breath, abd pain, N/V, suprapubic pain, flank pain, difficulty voiding, dysuria or other acute complaint at this time.  Pt has a remote tobacco use history, quit 30 years ago, but smoked for roughly 1/2ppd for approx. 7-8 years. 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 No family history of cancer. No personal history of cancer. No previous  history. (09 Nov 2021 20:28)  Pt is sp TURBT and has CBI ongoing         PAST MEDICAL & SURGICAL HISTORY:  Hypertension    Seizure        MEDICATIONS  (STANDING):  ceFAZolin   IVPB 1000 milliGRAM(s) IV Intermittent every 8 hours  furosemide    Tablet 20 milliGRAM(s) Oral daily  furosemide   Injectable 20 milliGRAM(s) IV Push once  heparin   Injectable 5000 Unit(s) SubCutaneous every 8 hours  influenza  Vaccine (HIGH DOSE) 0.7 milliLiter(s) IntraMuscular once  lactated ringers. 1000 milliLiter(s) (75 mL/Hr) IV Continuous <Continuous>  losartan 50 milliGRAM(s) Oral daily  phenytoin   Capsule 100 milliGRAM(s) Oral daily  primidone 250 milliGRAM(s) Oral daily  propranolol 20 milliGRAM(s) Oral two times a day      Allergies    No Known Allergies    Intolerances        SOCIAL HISTORY:  Denies alcohol abuse, drug abuse or tobacco usage.     FAMILY HISTORY:      VITALS:  T(C): 36.5 (11-12-21 @ 17:00), Max: 37.1 (11-12-21 @ 01:11)  HR: 89 (11-12-21 @ 17:00) (79 - 93)  BP: 159/77 (11-12-21 @ 17:00) (134/62 - 169/80)  RR: 17 (11-12-21 @ 17:00) (16 - 18)  SpO2: 100% (11-12-21 @ 17:00) (96% - 100%)    REVIEW OF SYSTEMS:  unable     PHYSICAL EXAM:  Constitutional: NAD  HEENT: EOMI  Neck:  No JVD, supple   Respiratory: CTA B/L  Cardiovascular: S1 and S2, RRR  Gastrointestinal: + BS, soft, NT, ND  Extremities: No peripheral edema, + peripheral pulses  Neurological: A/O x 3, CN2-12 intact  Psychiatric: Normal mood, normal affect  : +  [place]  Skin: No rashes, C/D/I  Access: Not applicable    I and O's:    11-10 @ 07:01  -  11-11 @ 07:00  --------------------------------------------------------  IN: 2260 mL / OUT: 0 mL / NET: 2260 mL    11-11 @ 07:01  -  11-12 @ 07:00  --------------------------------------------------------  IN: 1840 mL / OUT: 0 mL / NET: 1840 mL    11-12 @ 07:01  -  11-12 @ 17:21  --------------------------------------------------------  IN: 375 mL / OUT: 0 mL / NET: 375 mL      Height (cm): 162.6 (11-12 @ 13:13)  Weight (kg): 72.6 (11-12 @ 13:13)  BMI (kg/m2): 27.5 (11-12 @ 13:13)  BSA (m2): 1.78 (11-12 @ 13:13)    LABS:                        8.6    7.23  )-----------( 167      ( 12 Nov 2021 05:18 )             26.7     11-12    143  |  116<H>  |  39<H>  ----------------------------<  104<H>  4.9   |  15<L>  |  1.76<H>    Ca    8.3<L>      12 Nov 2021 05:18        URINE:      RADIOLOGY & ADDITIONAL STUDIES:    < from: CT Abdomen and Pelvis No Cont (11.09.21 @ 12:57) >    EXAM:  CT ABDOMEN AND PELVIS                            PROCEDURE DATE:  11/09/2021            INTERPRETATION:  CLINICAL INFORMATION: eval for renal stone    COMPARISON: 10.11.13.    CONTRAST/COMPLICATIONS:  IV Contrast: None  Oral Contrast: None  Complications: None    PROCEDURE:  CT of the Abdomen and Pelvis was performed.  Sagittal and coronal reformats were performed.    FINDINGS:    LOWER CHEST: Patchy airspace opacity in the medial right lower lobe is noted.    LIVER: Within normal limits.  BILE DUCTS: Normal caliber.  GALLBLADDER: Within normal limits.  SPLEEN: Within normal limits.  PANCREAS: Within normal limits.  ADRENALS: Stable nodularity of the left adrenal gland.  KIDNEYS/URETERS: Punctate right renal calculi. Parenchymal and parapelvic cysts. Mild left hydronephrosis.    BLADDER: A 2 cm left posterior bladder mass is noted.  REPRODUCTIVE ORGANS: Within normal limits.    BOWEL: No bowel obstruction.  PERITONEUM: No ascites.  VESSELS:  Dissection involving the thoracoabdominal aorta is noted. The descending thoracic aorta measures 4.7 cm previously 2.5 cm.  RETROPERITONEUM/LYMPH NODES: No lymphadenopathy.  ABDOMINAL WALL: Within normal limits.  BONES: Within normal limits.    IMPRESSION: Aortic dissection again noted. Aneurysm of the descending thoracic aorta is increased in size.    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    Medial right lung opacity is indeterminate; recommend one-month follow-up chest CT.    --- End of Report ---                MITRA JUAREZ MD; Attending Radiologist    < end of copied text >

## 2021-11-12 NOTE — PRE-OP CHECKLIST - HAIR REMOVAL
After Visit Summary   8/2/2017    Annalee Patyon    MRN: [id number]           Patient Information     Date Of Birth          1994        Visit Information        Provider Department      8/2/2017 9:00 AM UP NURSE [facility]        Today's Diagnoses     Screening examination for pulmonary tuberculosis    -  1       Follow-ups after your visit        Who to contact     If you have questions or need follow up information about today's clinic visit or your schedule please contact [facility] directly at [phone].  Normal or non-critical lab and imaging results will be communicated to you by MyChart, letter or phone within 4 business days after the clinic has received the results. If you do not hear from us within 7 days, please contact the clinic through [employer] or phone. If you have a critical or abnormal lab result, we will notify you by phone as soon as possible.  Submit refill requests through [employer] or call your pharmacy and they will forward the refill request to us. Please allow 3 business days for your refill to be completed.          Additional Information About Your Visit        MyChart Information     [employer] gives you secure access to your electronic health record. If you see a primary care provider, you can also send messages to your care team and make appointments. If you have questions, please call your primary care clinic.  If you do not have a primary care provider, please call [phone] and they will assist you.        Care EveryWhere ID     This is your Care EveryWhere ID. This could be used by other organizations to access your [facility] medical records  [id number]         Blood Pressure from Last 3 Encounters:   05/20/16 118/78   02/16/16 144/89   12/14/15 110/68    Weight from Last 3 Encounters:   05/20/16 155 lb (70.3 kg)   12/14/15 158 lb (71.7 kg)   12/04/15 159 lb (72.1 kg)              Today, you had the following     No orders found for  display       Primary Care Provider Office Phone # Fax #    Deqa Leonardo Wilkes [phone]       [facility] [address]  [facility] [address]        Equal Access to Services     ROSA MARTÍNEZ : Hadii raquel ku hadyvetteo Soomaali, waaxda luqadaha, qaybta kaalmada adeegyada, deny luis fin hayaan gabriellejojo smithalvaro farrell. So [facility] [phone].    ATENCIÓN: Si habla español, tiene a mejía disposición servicios gratuitos de asistencia lingüística. [facility] [phone].    We comply with applicable federal civil rights laws and [place] laws. We do not discriminate on the basis of race, color, national origin, age, disability sex, sexual orientation or gender identity.            Thank you!     Thank you for choosing [facility] NURSE  for your care. Our goal is always to provide you with excellent care. Hearing back from our patients is one way we can continue to improve our services. Please take a few minutes to complete the written survey that you may receive in the mail after your visit with us. Thank you!             Your Updated Medication List - Protect others around you: Learn how to safely use, store and throw away your medicines at www.disposemymeds.org.          This list is accurate as of: 8/2/17  9:29 AM.  Always use your most recent med list.                   Brand Name Dispense Instructions for use Diagnosis    * albuterol 108 (90 BASE) MCG/ACT Inhaler    PROAIR HFA/PROVENTIL HFA/VENTOLIN HFA    1 Inhaler    Inhale 2 puffs into the lungs every 6 hours as needed for shortness of breath / dyspnea or wheezing    Cough       * albuterol 108 (90 BASE) MCG/ACT Inhaler    PROAIR HFA/PROVENTIL HFA/VENTOLIN HFA    1 Inhaler    Inhale 2 puffs into the lungs every 6 hours as needed for shortness of breath / dyspnea or wheezing    Cough       levonorgestrel-ethinyl estradiol 0.1-20 MG-MCG per tablet    GILBERT LEWIS LESSINA     Take 1 tablet by mouth daily        ondansetron 4 MG tablet     ZOFRAN    10 tablet    Take 1-2 tablets (4-8 mg) by mouth every 8 hours as needed for nausea    Fever, unspecified       * Notice:  This list has 2 medication(s) that are the same as other medications prescribed for you. Read the directions carefully, and ask your doctor or other care provider to review them with you.       hair removal not indicated

## 2021-11-12 NOTE — PROGRESS NOTE ADULT - ASSESSMENT
82 y/o M with PMHx of HTN, aortic aneurysm, and seizure d/o here with gross hematuria. CT scan demonstrated 2cm bladder mass with mild L hydro.     - continue CBI  - OR for TURBT   - NPO  - medical clearance done  - monitor urine color. hand irrigate PRN  - trend H/H  - Regular diet  - gentle IVF  - Ancef for  ppx given CBI  - follow up urine culture  - AM labs  - DVT ppx /Incentive Spirometry

## 2021-11-12 NOTE — PRE-OP CHECKLIST - AS TEMP SITE
Patient returned my call, we discussed the knitted knockers. Patient would like one, but states she won't be able to come pick it out because of her lack of transportation. She would like me to mail her one. She requests around a size C/D, we discussed how to remove stuffing to make it smaller if necessary. Confirmed PO box in chart is appropriate place to send.    oral

## 2021-11-12 NOTE — PROGRESS NOTE ADULT - SUBJECTIVE AND OBJECTIVE BOX
Post op Check    Pt seen and examined without complaints. Pain is controlled. Denies SOB/CP/N/V.     Vital Signs Last 24 Hrs  T(C): 36.5 (12 Nov 2021 17:00), Max: 37.1 (12 Nov 2021 01:11)  T(F): 97.7 (12 Nov 2021 17:00), Max: 98.7 (12 Nov 2021 01:11)  HR: 89 (12 Nov 2021 17:00) (79 - 93)  BP: 159/77 (12 Nov 2021 17:00) (134/62 - 169/80)  BP(mean): 110 (12 Nov 2021 17:00) (96 - 111)  RR: 17 (12 Nov 2021 17:00) (16 - 18)  SpO2: 100% (12 Nov 2021 17:00) (96% - 100%)    I&O's Summary    11 Nov 2021 07:01  -  12 Nov 2021 07:00  --------------------------------------------------------  IN: 1840 mL / OUT: 0 mL / NET: 1840 mL    12 Nov 2021 07:01  -  12 Nov 2021 17:12  --------------------------------------------------------  IN: 375 mL / OUT: 0 mL / NET: 375 mL    Physical Exam  Gen: NAD, A&Ox3  Pulm: No respiratory distress, no subcostal retractions  CV: RRR, no JVD  Abd: Soft, NT, ND  : CBI clear on fast gtt                          8.6    7.23  )-----------( 167      ( 12 Nov 2021 05:18 )             26.7       11-12    143  |  116<H>  |  39<H>  ----------------------------<  104<H>  4.9   |  15<L>  |  1.76<H>    Ca    8.3<L>      12 Nov 2021 05:18

## 2021-11-13 ENCOUNTER — TRANSCRIPTION ENCOUNTER (OUTPATIENT)
Age: 81
End: 2021-11-13

## 2021-11-13 VITALS
OXYGEN SATURATION: 97 % | RESPIRATION RATE: 18 BRPM | HEART RATE: 90 BPM | SYSTOLIC BLOOD PRESSURE: 148 MMHG | DIASTOLIC BLOOD PRESSURE: 78 MMHG | TEMPERATURE: 98 F

## 2021-11-13 LAB
ANION GAP SERPL CALC-SCNC: 15 MMOL/L — SIGNIFICANT CHANGE UP (ref 5–17)
BUN SERPL-MCNC: 35 MG/DL — HIGH (ref 7–23)
CALCIUM SERPL-MCNC: 8.6 MG/DL — SIGNIFICANT CHANGE UP (ref 8.4–10.5)
CHLORIDE SERPL-SCNC: 112 MMOL/L — HIGH (ref 96–108)
CO2 SERPL-SCNC: 17 MMOL/L — LOW (ref 22–31)
CREAT SERPL-MCNC: 1.59 MG/DL — HIGH (ref 0.5–1.3)
GLUCOSE SERPL-MCNC: 110 MG/DL — HIGH (ref 70–99)
HCT VFR BLD CALC: 36.2 % — LOW (ref 39–50)
HGB BLD-MCNC: 11.9 G/DL — LOW (ref 13–17)
MCHC RBC-ENTMCNC: 30.9 PG — SIGNIFICANT CHANGE UP (ref 27–34)
MCHC RBC-ENTMCNC: 32.9 GM/DL — SIGNIFICANT CHANGE UP (ref 32–36)
MCV RBC AUTO: 94 FL — SIGNIFICANT CHANGE UP (ref 80–100)
NRBC # BLD: 0 /100 WBCS — SIGNIFICANT CHANGE UP (ref 0–0)
PLATELET # BLD AUTO: 134 K/UL — LOW (ref 150–400)
POTASSIUM SERPL-MCNC: 4.5 MMOL/L — SIGNIFICANT CHANGE UP (ref 3.5–5.3)
POTASSIUM SERPL-SCNC: 4.5 MMOL/L — SIGNIFICANT CHANGE UP (ref 3.5–5.3)
RBC # BLD: 3.85 M/UL — LOW (ref 4.2–5.8)
RBC # FLD: 17.1 % — HIGH (ref 10.3–14.5)
SODIUM SERPL-SCNC: 144 MMOL/L — SIGNIFICANT CHANGE UP (ref 135–145)
WBC # BLD: 7.17 K/UL — SIGNIFICANT CHANGE UP (ref 3.8–10.5)
WBC # FLD AUTO: 7.17 K/UL — SIGNIFICANT CHANGE UP (ref 3.8–10.5)

## 2021-11-13 PROCEDURE — 86923 COMPATIBILITY TEST ELECTRIC: CPT

## 2021-11-13 PROCEDURE — 81001 URINALYSIS AUTO W/SCOPE: CPT

## 2021-11-13 PROCEDURE — 96374 THER/PROPH/DIAG INJ IV PUSH: CPT

## 2021-11-13 PROCEDURE — 86901 BLOOD TYPING SEROLOGIC RH(D): CPT

## 2021-11-13 PROCEDURE — P9016: CPT

## 2021-11-13 PROCEDURE — U0005: CPT

## 2021-11-13 PROCEDURE — 85610 PROTHROMBIN TIME: CPT

## 2021-11-13 PROCEDURE — C9399: CPT

## 2021-11-13 PROCEDURE — 80053 COMPREHEN METABOLIC PANEL: CPT

## 2021-11-13 PROCEDURE — 85027 COMPLETE CBC AUTOMATED: CPT

## 2021-11-13 PROCEDURE — 85025 COMPLETE CBC W/AUTO DIFF WBC: CPT

## 2021-11-13 PROCEDURE — 93306 TTE W/DOPPLER COMPLETE: CPT

## 2021-11-13 PROCEDURE — 74176 CT ABD & PELVIS W/O CONTRAST: CPT | Mod: MA

## 2021-11-13 PROCEDURE — 86769 SARS-COV-2 COVID-19 ANTIBODY: CPT

## 2021-11-13 PROCEDURE — 80048 BASIC METABOLIC PNL TOTAL CA: CPT

## 2021-11-13 PROCEDURE — 88112 CYTOPATH CELL ENHANCE TECH: CPT

## 2021-11-13 PROCEDURE — 99285 EMERGENCY DEPT VISIT HI MDM: CPT

## 2021-11-13 PROCEDURE — 36415 COLL VENOUS BLD VENIPUNCTURE: CPT

## 2021-11-13 PROCEDURE — 85730 THROMBOPLASTIN TIME PARTIAL: CPT

## 2021-11-13 PROCEDURE — U0003: CPT

## 2021-11-13 PROCEDURE — 87086 URINE CULTURE/COLONY COUNT: CPT

## 2021-11-13 PROCEDURE — 93005 ELECTROCARDIOGRAM TRACING: CPT

## 2021-11-13 PROCEDURE — 88305 TISSUE EXAM BY PATHOLOGIST: CPT

## 2021-11-13 PROCEDURE — 86850 RBC ANTIBODY SCREEN: CPT

## 2021-11-13 PROCEDURE — 86900 BLOOD TYPING SEROLOGIC ABO: CPT

## 2021-11-13 PROCEDURE — 71045 X-RAY EXAM CHEST 1 VIEW: CPT

## 2021-11-13 PROCEDURE — 96375 TX/PRO/DX INJ NEW DRUG ADDON: CPT

## 2021-11-13 PROCEDURE — 36430 TRANSFUSION BLD/BLD COMPNT: CPT

## 2021-11-13 RX ORDER — CEPHALEXIN 500 MG
1 CAPSULE ORAL
Qty: 6 | Refills: 0
Start: 2021-11-13 | End: 2021-11-15

## 2021-11-13 RX ORDER — SODIUM CHLORIDE 9 MG/ML
1000 INJECTION, SOLUTION INTRAVENOUS
Refills: 0 | Status: DISCONTINUED | OUTPATIENT
Start: 2021-11-13 | End: 2021-11-13

## 2021-11-13 RX ADMIN — Medication 100 MILLIGRAM(S): at 12:56

## 2021-11-13 RX ADMIN — SODIUM CHLORIDE 75 MILLILITER(S): 9 INJECTION, SOLUTION INTRAVENOUS at 07:01

## 2021-11-13 RX ADMIN — Medication 100 MILLIGRAM(S): at 06:25

## 2021-11-13 RX ADMIN — Medication 20 MILLIGRAM(S): at 06:25

## 2021-11-13 RX ADMIN — HEPARIN SODIUM 5000 UNIT(S): 5000 INJECTION INTRAVENOUS; SUBCUTANEOUS at 06:24

## 2021-11-13 RX ADMIN — LOSARTAN POTASSIUM 50 MILLIGRAM(S): 100 TABLET, FILM COATED ORAL at 06:25

## 2021-11-13 RX ADMIN — PRIMIDONE 250 MILLIGRAM(S): 250 TABLET ORAL at 12:57

## 2021-11-13 RX ADMIN — Medication 100 MILLIGRAM(S): at 15:08

## 2021-11-13 NOTE — DISCHARGE NOTE PROVIDER - CARE PROVIDER_API CALL
Fabio Francis)  Urology  [address] [place] [address], [place] [address]  Phone: [phone]  Fax: [phone]  Follow Up Time: 2 weeks

## 2021-11-13 NOTE — DISCHARGE NOTE PROVIDER - NSDCCPGOAL_GEN_ALL_CORE_FT
To get better and follow your care plan as instructed.  Discharge Instructions: TURP/TURBT    •	Catheter: Some patients are sent home with a alvarez catheter while others go home urinating on their own. If you still have a catheter, the nurses will review instructions and care before you go home. For men, you will have a prescription for 1% lidocaine jelly to apply to the tip of your penis as needed for catheter related discomfort.  •	General: It is common to have blood in the urine after your procedure. It may be pink or even red; inform your doctor if you have a significant amount of clot in the urine or if you are unable to void at all or if your catheter stops draining. It is not uncommon to have some burning when you urinate, this will gradually improve. With a catheter in place, it is not uncommon to have occasional leakage of urine or blood around the catheter. Please call your urologist if this is excessive and/or the urine is not draining through the catheter into the bag.  •	Bathing: You may shower or bathe. If going home with Alvarez, shower only until catheter is removed.  •	Diet: You may resume your regular diet and regular medication regimen.  •	Pain: You may take Tylenol (acetaminophen) 650-975mg and/or Motrin (ibuprofen) 400-600mg, available over the counter, for pain every 6 hours as needed. Do not exceed 4000 milligrams of Tylenol (acetaminophen) daily. You may alternate these medications such that you take either one every 3 hours.  •	Antibiotics: You may be given a prescription for an antibiotic, please take this medication as instructed and be sure to complete entire course.  •	Stool softeners: Do not allow yourself to become constipated as straining will cause bleeding. Take stool softeners (ex. Colace) or a laxative (ex. Senekot, ExLax), available over the counter, if needed.  •	Activity: No heavy lifting or strenuous exercise until you are evaluated at your post-operative appointment. Otherwise, you may return to your usual level of activity.  •	Anticoagulation: If you are taking any blood thinning medications, please discuss with your urologist prior to restarting these medications unless otherwise specified.  •	Follow-up: If you did not already schedule your post-operative appointment, please call your urologist to schedule a follow-up appointment.  •	Call your urologist if: You have any bleeding that does not stop, inability to void >8 hours, fever over 100.4 F, chills, persistent nausea/vomiting, or if your pain is not controlled on your discharge pain medications.

## 2021-11-13 NOTE — PROGRESS NOTE ADULT - ASSESSMENT
A/P: 81y Male s/p TURBT   DVT prophylaxis/OOB  Incentive spirometry  Strict I&O's  Analgesia and antiemetics as needed  Diet-regular   AM labs reviewed s/p 2 u RBC   CBI overnight, wean as tolerated

## 2021-11-13 NOTE — DISCHARGE NOTE PROVIDER - NSDCMRMEDTOKEN_GEN_ALL_CORE_FT
furosemide 20 mg oral tablet: 1 tab(s) orally once a day  losartan 50 mg oral tablet: 1 tab(s) orally once a day  phenytoin 100 mg oral capsule: 1 cap(s) orally once a day  primidone 250 mg oral tablet: 1 tab(s) orally once a day  propranolol 20 mg oral tablet: 1 tab(s) orally 2 times a day   furosemide 20 mg oral tablet: 1 tab(s) orally once a day  Keflex 500 mg oral capsule: 1 cap(s) orally 2 times a day   losartan 50 mg oral tablet: 1 tab(s) orally once a day  phenytoin 100 mg oral capsule: 1 cap(s) orally once a day  primidone 250 mg oral tablet: 1 tab(s) orally once a day  propranolol 20 mg oral tablet: 1 tab(s) orally 2 times a day

## 2021-11-13 NOTE — DISCHARGE NOTE PROVIDER - HOSPITAL COURSE
82 y/o M with PMHx of HTN, aortic aneurysm, and seizure d/o presents to the ED with complaint of gross hematuria. Pt has had gross hematuria over the past 1-2 weeks. Followed up outpatient with PCP who sent for renal/bladder US (which son reported was normal) and discussed to have pt follow up with urologist, but has not made appointment yet. Last night, urine color got much darker. Has been able to void. Had three separate incidences of clots, but not with each void. Feels like he empties his bladder after he is done voiding. Has not had gross hematuria before. Not on AC or blood thinning medication. Denies fever/chills, chest pain, shortness of breath, abd pain, N/V, suprapubic pain, flank pain, difficulty voiding, dysuria or other acute complaint at this time.    HD 1: alvarez upsized. manual irrigation. CBI started.   HD 2: remained on CBI  HD 3: OR for TURBT   HD 4: alvarez removed. voiding clear yellow     At the time of discharge, the patient was hemodynamically stable, was tolerating PO diet, was voiding urine and passing stool, was ambulating, and was comfortable with adequate pain control. The patient was instructed to follow up with Dr. Francis within 1-2 weeks after discharge from the hospital. The patient/family felt comfortable with discharge. The patient was discharged to home. The patient had no other issues.

## 2021-11-13 NOTE — PROGRESS NOTE ADULT - SUBJECTIVE AND OBJECTIVE BOX
Interval events:   s/p turbt     SUBJECTIVE:        OBJECTIVE:  Vital Signs Last 24 Hrs  T(C): 36.7 (13 Nov 2021 06:10), Max: 37.4 (12 Nov 2021 18:55)  T(F): 98.1 (13 Nov 2021 06:10), Max: 99.4 (12 Nov 2021 18:55)  HR: 85 (13 Nov 2021 06:10) (72 - 93)  BP: 168/87 (13 Nov 2021 06:10) (121/67 - 169/80)  BP(mean): 106 (12 Nov 2021 17:15) (96 - 111)  RR: 17 (13 Nov 2021 06:10) (16 - 18)  SpO2: 98% (13 Nov 2021 06:10) (94% - 100%)    Physical Examination:  GEN: NAD, resting quietly  ABD: soft, nontender, nondistended  : antonio secure, cbi       LABS:                        11.9   7.17  )-----------( 134      ( 13 Nov 2021 06:55 )             36.2       11-13    144  |  112<H>  |  35<H>  ----------------------------<  110<H>  4.5   |  17<L>  |  1.59<H>    Ca    8.6      13 Nov 2021 06:55             Interval events:   s/p turbt     SUBJECTIVE:  no complaints       OBJECTIVE:  Vital Signs Last 24 Hrs  T(C): 36.7 (13 Nov 2021 06:10), Max: 37.4 (12 Nov 2021 18:55)  T(F): 98.1 (13 Nov 2021 06:10), Max: 99.4 (12 Nov 2021 18:55)  HR: 85 (13 Nov 2021 06:10) (72 - 93)  BP: 168/87 (13 Nov 2021 06:10) (121/67 - 169/80)  BP(mean): 106 (12 Nov 2021 17:15) (96 - 111)  RR: 17 (13 Nov 2021 06:10) (16 - 18)  SpO2: 98% (13 Nov 2021 06:10) (94% - 100%)    Physical Examination:  GEN: NAD, resting quietly  ABD: soft, nontender, nondistended  : antonio secure, cbi       LABS:                        11.9   7.17  )-----------( 134      ( 13 Nov 2021 06:55 )             36.2       11-13    144  |  112<H>  |  35<H>  ----------------------------<  110<H>  4.5   |  17<L>  |  1.59<H>    Ca    8.6      13 Nov 2021 06:55

## 2021-11-16 LAB — NON-GYNECOLOGICAL CYTOLOGY STUDY: SIGNIFICANT CHANGE UP

## 2021-11-18 LAB — SURGICAL PATHOLOGY STUDY: SIGNIFICANT CHANGE UP

## 2021-11-19 PROBLEM — I10 ESSENTIAL (PRIMARY) HYPERTENSION: Chronic | Status: ACTIVE | Noted: 2021-11-09

## 2021-11-19 PROBLEM — R56.9 UNSPECIFIED CONVULSIONS: Chronic | Status: ACTIVE | Noted: 2021-11-09

## 2022-02-09 ENCOUNTER — APPOINTMENT (OUTPATIENT)
Dept: UROLOGY | Facility: CLINIC | Age: 82
End: 2022-02-09
Payer: MEDICARE

## 2022-02-09 ENCOUNTER — OUTPATIENT (OUTPATIENT)
Dept: OUTPATIENT SERVICES | Facility: HOSPITAL | Age: 82
LOS: 1 days | End: 2022-02-09
Payer: COMMERCIAL

## 2022-02-09 VITALS — SYSTOLIC BLOOD PRESSURE: 158 MMHG | HEART RATE: 94 BPM | DIASTOLIC BLOOD PRESSURE: 84 MMHG

## 2022-02-09 DIAGNOSIS — N40.1 BENIGN PROSTATIC HYPERPLASIA WITH LOWER URINARY TRACT SYMPTOMS: ICD-10-CM

## 2022-02-09 DIAGNOSIS — R35.0 FREQUENCY OF MICTURITION: ICD-10-CM

## 2022-02-09 DIAGNOSIS — C67.9 MALIGNANT NEOPLASM OF BLADDER, UNSPECIFIED: ICD-10-CM

## 2022-02-09 PROCEDURE — 52000 CYSTOURETHROSCOPY: CPT

## 2022-02-09 PROCEDURE — 99213 OFFICE O/P EST LOW 20 MIN: CPT | Mod: 25

## 2022-02-10 LAB
APPEARANCE: CLEAR
BACTERIA: NEGATIVE
BILIRUBIN URINE: NEGATIVE
BLOOD URINE: ABNORMAL
COLOR: COLORLESS
GLUCOSE QUALITATIVE U: NEGATIVE
HYALINE CASTS: 0 /LPF
KETONES URINE: NEGATIVE
LEUKOCYTE ESTERASE URINE: NEGATIVE
MICROSCOPIC-UA: NORMAL
NITRITE URINE: NEGATIVE
PH URINE: 6
PROTEIN URINE: NORMAL
RED BLOOD CELLS URINE: 10 /HPF
SPECIFIC GRAVITY URINE: 1.01
SQUAMOUS EPITHELIAL CELLS: 0 /HPF
URINE CYTOLOGY: NORMAL
UROBILINOGEN URINE: NORMAL
WHITE BLOOD CELLS URINE: 2 /HPF

## 2022-02-10 NOTE — ED PROVIDER NOTE - CROS ED NEURO ALL NEG
I have reviewed the history, physical exam, assessment and management plans.  I concur with or have edited all elements of her note.
negative...

## 2022-09-07 ENCOUNTER — APPOINTMENT (OUTPATIENT)
Dept: UROLOGY | Facility: CLINIC | Age: 82
End: 2022-09-07

## 2022-09-07 ENCOUNTER — OUTPATIENT (OUTPATIENT)
Dept: OUTPATIENT SERVICES | Facility: HOSPITAL | Age: 82
LOS: 1 days | End: 2022-09-07
Payer: COMMERCIAL

## 2022-09-07 VITALS — DIASTOLIC BLOOD PRESSURE: 70 MMHG | SYSTOLIC BLOOD PRESSURE: 130 MMHG | HEART RATE: 68 BPM

## 2022-09-07 DIAGNOSIS — C67.9 MALIGNANT NEOPLASM OF BLADDER, UNSPECIFIED: ICD-10-CM

## 2022-09-07 DIAGNOSIS — R35.0 FREQUENCY OF MICTURITION: ICD-10-CM

## 2022-09-07 DIAGNOSIS — N13.8 BENIGN PROSTATIC HYPERPLASIA WITH LOWER URINARY TRACT SYMPMS: ICD-10-CM

## 2022-09-07 DIAGNOSIS — N40.1 BENIGN PROSTATIC HYPERPLASIA WITH LOWER URINARY TRACT SYMPMS: ICD-10-CM

## 2022-09-07 PROCEDURE — 52000 CYSTOURETHROSCOPY: CPT

## 2022-09-07 PROCEDURE — 99213 OFFICE O/P EST LOW 20 MIN: CPT | Mod: 25

## 2022-09-08 DIAGNOSIS — C67.9 MALIGNANT NEOPLASM OF BLADDER, UNSPECIFIED: ICD-10-CM

## 2022-09-08 DIAGNOSIS — N40.1 BENIGN PROSTATIC HYPERPLASIA WITH LOWER URINARY TRACT SYMPTOMS: ICD-10-CM

## 2022-09-08 LAB
APPEARANCE: CLEAR
BACTERIA: NEGATIVE
BILIRUBIN URINE: NEGATIVE
BLOOD URINE: NEGATIVE
COLOR: NORMAL
GLUCOSE QUALITATIVE U: NEGATIVE
HYALINE CASTS: 1 /LPF
KETONES URINE: NEGATIVE
LEUKOCYTE ESTERASE URINE: NEGATIVE
MICROSCOPIC-UA: NORMAL
NITRITE URINE: NEGATIVE
PH URINE: 6
PROTEIN URINE: NEGATIVE
PSA FREE FLD-MCNC: 30 %
PSA FREE SERPL-MCNC: 0.03 NG/ML
PSA SERPL-MCNC: 0.1 NG/ML
RED BLOOD CELLS URINE: 1 /HPF
SPECIFIC GRAVITY URINE: 1
SQUAMOUS EPITHELIAL CELLS: 1 /HPF
UROBILINOGEN URINE: NORMAL
WHITE BLOOD CELLS URINE: 0 /HPF

## 2022-09-10 LAB — URINE CYTOLOGY: NORMAL

## 2022-11-03 NOTE — ED PROVIDER NOTE - PROGRESS NOTE ADDITIONAL3
Pharmacy faxed in a request for prior authorization on:    Medication: Xarelto   Dosage: 20 mg    Quantity requested:  90   Pharmacy for prescription has been selected.    Initiation of prior authorization needed.   Additional Progress Note...

## 2023-03-15 ENCOUNTER — APPOINTMENT (OUTPATIENT)
Dept: UROLOGY | Facility: CLINIC | Age: 83
End: 2023-03-15

## 2023-11-27 ENCOUNTER — INPATIENT (INPATIENT)
Facility: HOSPITAL | Age: 83
LOS: 10 days | Discharge: HOME | DRG: 92 | End: 2023-12-08
Attending: INTERNAL MEDICINE | Admitting: THORACIC SURGERY (CARDIOTHORACIC VASCULAR SURGERY)
Payer: COMMERCIAL

## 2023-11-27 VITALS
HEART RATE: 83 BPM | WEIGHT: 125 LBS | TEMPERATURE: 98 F | SYSTOLIC BLOOD PRESSURE: 169 MMHG | OXYGEN SATURATION: 98 % | DIASTOLIC BLOOD PRESSURE: 94 MMHG | HEIGHT: 64 IN | RESPIRATION RATE: 16 BRPM

## 2023-11-27 DIAGNOSIS — Z98.890 OTHER SPECIFIED POSTPROCEDURAL STATES: Chronic | ICD-10-CM

## 2023-11-27 DIAGNOSIS — I71.00 DISSECTION OF UNSPECIFIED SITE OF AORTA: ICD-10-CM

## 2023-11-27 LAB
ALBUMIN SERPL ELPH-MCNC: 4.9 G/DL — SIGNIFICANT CHANGE UP (ref 3.3–5)
ALBUMIN SERPL ELPH-MCNC: 4.9 G/DL — SIGNIFICANT CHANGE UP (ref 3.3–5)
ALP SERPL-CCNC: 92 U/L — SIGNIFICANT CHANGE UP (ref 40–120)
ALP SERPL-CCNC: 92 U/L — SIGNIFICANT CHANGE UP (ref 40–120)
ALT FLD-CCNC: 20 U/L — SIGNIFICANT CHANGE UP (ref 10–45)
ALT FLD-CCNC: 20 U/L — SIGNIFICANT CHANGE UP (ref 10–45)
ANION GAP SERPL CALC-SCNC: 15 MMOL/L — SIGNIFICANT CHANGE UP (ref 5–17)
ANION GAP SERPL CALC-SCNC: 15 MMOL/L — SIGNIFICANT CHANGE UP (ref 5–17)
AST SERPL-CCNC: 23 U/L — SIGNIFICANT CHANGE UP (ref 10–40)
AST SERPL-CCNC: 23 U/L — SIGNIFICANT CHANGE UP (ref 10–40)
BASOPHILS # BLD AUTO: 0.04 K/UL — SIGNIFICANT CHANGE UP (ref 0–0.2)
BASOPHILS # BLD AUTO: 0.04 K/UL — SIGNIFICANT CHANGE UP (ref 0–0.2)
BASOPHILS NFR BLD AUTO: 0.5 % — SIGNIFICANT CHANGE UP (ref 0–2)
BASOPHILS NFR BLD AUTO: 0.5 % — SIGNIFICANT CHANGE UP (ref 0–2)
BILIRUB SERPL-MCNC: 0.2 MG/DL — SIGNIFICANT CHANGE UP (ref 0.2–1.2)
BILIRUB SERPL-MCNC: 0.2 MG/DL — SIGNIFICANT CHANGE UP (ref 0.2–1.2)
BLD GP AB SCN SERPL QL: NEGATIVE — SIGNIFICANT CHANGE UP
BLD GP AB SCN SERPL QL: NEGATIVE — SIGNIFICANT CHANGE UP
BUN SERPL-MCNC: 73 MG/DL — HIGH (ref 7–23)
BUN SERPL-MCNC: 73 MG/DL — HIGH (ref 7–23)
CALCIUM SERPL-MCNC: 9.5 MG/DL — SIGNIFICANT CHANGE UP (ref 8.4–10.5)
CALCIUM SERPL-MCNC: 9.5 MG/DL — SIGNIFICANT CHANGE UP (ref 8.4–10.5)
CHLORIDE SERPL-SCNC: 108 MMOL/L — SIGNIFICANT CHANGE UP (ref 96–108)
CHLORIDE SERPL-SCNC: 108 MMOL/L — SIGNIFICANT CHANGE UP (ref 96–108)
CO2 SERPL-SCNC: 16 MMOL/L — LOW (ref 22–31)
CO2 SERPL-SCNC: 16 MMOL/L — LOW (ref 22–31)
CREAT SERPL-MCNC: 1.98 MG/DL — HIGH (ref 0.5–1.3)
CREAT SERPL-MCNC: 1.98 MG/DL — HIGH (ref 0.5–1.3)
EGFR: 33 ML/MIN/1.73M2 — LOW
EGFR: 33 ML/MIN/1.73M2 — LOW
EOSINOPHIL # BLD AUTO: 0.15 K/UL — SIGNIFICANT CHANGE UP (ref 0–0.5)
EOSINOPHIL # BLD AUTO: 0.15 K/UL — SIGNIFICANT CHANGE UP (ref 0–0.5)
EOSINOPHIL NFR BLD AUTO: 1.9 % — SIGNIFICANT CHANGE UP (ref 0–6)
EOSINOPHIL NFR BLD AUTO: 1.9 % — SIGNIFICANT CHANGE UP (ref 0–6)
GAS PNL BLDA: SIGNIFICANT CHANGE UP
GAS PNL BLDA: SIGNIFICANT CHANGE UP
GLUCOSE SERPL-MCNC: 129 MG/DL — HIGH (ref 70–99)
GLUCOSE SERPL-MCNC: 129 MG/DL — HIGH (ref 70–99)
HCT VFR BLD CALC: 34 % — LOW (ref 39–50)
HCT VFR BLD CALC: 34 % — LOW (ref 39–50)
HGB BLD-MCNC: 11.1 G/DL — LOW (ref 13–17)
HGB BLD-MCNC: 11.1 G/DL — LOW (ref 13–17)
IMM GRANULOCYTES NFR BLD AUTO: 0.4 % — SIGNIFICANT CHANGE UP (ref 0–0.9)
IMM GRANULOCYTES NFR BLD AUTO: 0.4 % — SIGNIFICANT CHANGE UP (ref 0–0.9)
INR BLD: 1.71 RATIO — HIGH (ref 0.85–1.18)
INR BLD: 1.71 RATIO — HIGH (ref 0.85–1.18)
LIDOCAIN IGE QN: 83 U/L — HIGH (ref 7–60)
LIDOCAIN IGE QN: 83 U/L — HIGH (ref 7–60)
LYMPHOCYTES # BLD AUTO: 1.11 K/UL — SIGNIFICANT CHANGE UP (ref 1–3.3)
LYMPHOCYTES # BLD AUTO: 1.11 K/UL — SIGNIFICANT CHANGE UP (ref 1–3.3)
LYMPHOCYTES # BLD AUTO: 13.9 % — SIGNIFICANT CHANGE UP (ref 13–44)
LYMPHOCYTES # BLD AUTO: 13.9 % — SIGNIFICANT CHANGE UP (ref 13–44)
MCHC RBC-ENTMCNC: 32.2 PG — SIGNIFICANT CHANGE UP (ref 27–34)
MCHC RBC-ENTMCNC: 32.2 PG — SIGNIFICANT CHANGE UP (ref 27–34)
MCHC RBC-ENTMCNC: 32.6 GM/DL — SIGNIFICANT CHANGE UP (ref 32–36)
MCHC RBC-ENTMCNC: 32.6 GM/DL — SIGNIFICANT CHANGE UP (ref 32–36)
MCV RBC AUTO: 98.6 FL — SIGNIFICANT CHANGE UP (ref 80–100)
MCV RBC AUTO: 98.6 FL — SIGNIFICANT CHANGE UP (ref 80–100)
MONOCYTES # BLD AUTO: 0.31 K/UL — SIGNIFICANT CHANGE UP (ref 0–0.9)
MONOCYTES # BLD AUTO: 0.31 K/UL — SIGNIFICANT CHANGE UP (ref 0–0.9)
MONOCYTES NFR BLD AUTO: 3.9 % — SIGNIFICANT CHANGE UP (ref 2–14)
MONOCYTES NFR BLD AUTO: 3.9 % — SIGNIFICANT CHANGE UP (ref 2–14)
NEUTROPHILS # BLD AUTO: 6.35 K/UL — SIGNIFICANT CHANGE UP (ref 1.8–7.4)
NEUTROPHILS # BLD AUTO: 6.35 K/UL — SIGNIFICANT CHANGE UP (ref 1.8–7.4)
NEUTROPHILS NFR BLD AUTO: 79.4 % — HIGH (ref 43–77)
NEUTROPHILS NFR BLD AUTO: 79.4 % — HIGH (ref 43–77)
NRBC # BLD: 0 /100 WBCS — SIGNIFICANT CHANGE UP (ref 0–0)
NRBC # BLD: 0 /100 WBCS — SIGNIFICANT CHANGE UP (ref 0–0)
PLATELET # BLD AUTO: 134 K/UL — LOW (ref 150–400)
PLATELET # BLD AUTO: 134 K/UL — LOW (ref 150–400)
POTASSIUM SERPL-MCNC: 5.9 MMOL/L — HIGH (ref 3.5–5.3)
POTASSIUM SERPL-MCNC: 5.9 MMOL/L — HIGH (ref 3.5–5.3)
POTASSIUM SERPL-SCNC: 5.9 MMOL/L — HIGH (ref 3.5–5.3)
POTASSIUM SERPL-SCNC: 5.9 MMOL/L — HIGH (ref 3.5–5.3)
PROT SERPL-MCNC: 8 G/DL — SIGNIFICANT CHANGE UP (ref 6–8.3)
PROT SERPL-MCNC: 8 G/DL — SIGNIFICANT CHANGE UP (ref 6–8.3)
PROTHROM AB SERPL-ACNC: 18.5 SEC — HIGH (ref 9.5–13)
PROTHROM AB SERPL-ACNC: 18.5 SEC — HIGH (ref 9.5–13)
RBC # BLD: 3.45 M/UL — LOW (ref 4.2–5.8)
RBC # BLD: 3.45 M/UL — LOW (ref 4.2–5.8)
RBC # FLD: 13.7 % — SIGNIFICANT CHANGE UP (ref 10.3–14.5)
RBC # FLD: 13.7 % — SIGNIFICANT CHANGE UP (ref 10.3–14.5)
RH IG SCN BLD-IMP: POSITIVE — SIGNIFICANT CHANGE UP
RH IG SCN BLD-IMP: POSITIVE — SIGNIFICANT CHANGE UP
SODIUM SERPL-SCNC: 139 MMOL/L — SIGNIFICANT CHANGE UP (ref 135–145)
SODIUM SERPL-SCNC: 139 MMOL/L — SIGNIFICANT CHANGE UP (ref 135–145)
WBC # BLD: 7.99 K/UL — SIGNIFICANT CHANGE UP (ref 3.8–10.5)
WBC # BLD: 7.99 K/UL — SIGNIFICANT CHANGE UP (ref 3.8–10.5)
WBC # FLD AUTO: 7.99 K/UL — SIGNIFICANT CHANGE UP (ref 3.8–10.5)
WBC # FLD AUTO: 7.99 K/UL — SIGNIFICANT CHANGE UP (ref 3.8–10.5)

## 2023-11-27 PROCEDURE — 99285 EMERGENCY DEPT VISIT HI MDM: CPT

## 2023-11-27 PROCEDURE — 70487 CT MAXILLOFACIAL W/DYE: CPT | Mod: 26,MA

## 2023-11-27 PROCEDURE — 70450 CT HEAD/BRAIN W/O DYE: CPT | Mod: 26,XU,MA

## 2023-11-27 PROCEDURE — 70496 CT ANGIOGRAPHY HEAD: CPT | Mod: 26,MA

## 2023-11-27 PROCEDURE — 74174 CTA ABD&PLVS W/CONTRAST: CPT | Mod: 26,MA

## 2023-11-27 PROCEDURE — 71275 CT ANGIOGRAPHY CHEST: CPT | Mod: 26,MA

## 2023-11-27 PROCEDURE — 99223 1ST HOSP IP/OBS HIGH 75: CPT

## 2023-11-27 PROCEDURE — 72125 CT NECK SPINE W/O DYE: CPT | Mod: 26,MA

## 2023-11-27 PROCEDURE — 70498 CT ANGIOGRAPHY NECK: CPT | Mod: 26,MA

## 2023-11-27 RX ORDER — SODIUM CHLORIDE 9 MG/ML
1000 INJECTION INTRAMUSCULAR; INTRAVENOUS; SUBCUTANEOUS
Refills: 0 | Status: DISCONTINUED | OUTPATIENT
Start: 2023-11-27 | End: 2023-12-01

## 2023-11-27 RX ORDER — CHLORHEXIDINE GLUCONATE 213 G/1000ML
15 SOLUTION TOPICAL ONCE
Refills: 0 | Status: DISCONTINUED | OUTPATIENT
Start: 2023-11-27 | End: 2023-11-28

## 2023-11-27 RX ORDER — INSULIN HUMAN 100 [IU]/ML
5 INJECTION, SOLUTION SUBCUTANEOUS ONCE
Refills: 0 | Status: COMPLETED | OUTPATIENT
Start: 2023-11-27 | End: 2023-11-27

## 2023-11-27 RX ORDER — CHLORHEXIDINE GLUCONATE 213 G/1000ML
1 SOLUTION TOPICAL ONCE
Refills: 0 | Status: COMPLETED | OUTPATIENT
Start: 2023-11-27 | End: 2023-11-27

## 2023-11-27 RX ORDER — SODIUM CHLORIDE 9 MG/ML
3 INJECTION INTRAMUSCULAR; INTRAVENOUS; SUBCUTANEOUS EVERY 8 HOURS
Refills: 0 | Status: DISCONTINUED | OUTPATIENT
Start: 2023-11-27 | End: 2023-12-08

## 2023-11-27 RX ORDER — SODIUM BICARBONATE 1 MEQ/ML
50 SYRINGE (ML) INTRAVENOUS ONCE
Refills: 0 | Status: COMPLETED | OUTPATIENT
Start: 2023-11-27 | End: 2023-11-27

## 2023-11-27 RX ORDER — DEXTROSE 50 % IN WATER 50 %
50 SYRINGE (ML) INTRAVENOUS ONCE
Refills: 0 | Status: COMPLETED | OUTPATIENT
Start: 2023-11-27 | End: 2023-11-27

## 2023-11-27 RX ADMIN — INSULIN HUMAN 5 UNIT(S): 100 INJECTION, SOLUTION SUBCUTANEOUS at 23:30

## 2023-11-27 RX ADMIN — Medication 50 MILLILITER(S): at 23:30

## 2023-11-27 RX ADMIN — Medication 50 MILLIEQUIVALENT(S): at 23:30

## 2023-11-27 RX ADMIN — SODIUM CHLORIDE 3 MILLILITER(S): 9 INJECTION INTRAMUSCULAR; INTRAVENOUS; SUBCUTANEOUS at 23:51

## 2023-11-27 RX ADMIN — CHLORHEXIDINE GLUCONATE 1 APPLICATION(S): 213 SOLUTION TOPICAL at 20:00

## 2023-11-27 NOTE — ED ADULT TRIAGE NOTE - RESPIRATORY RATE (BREATHS/MIN)
16
Breathing spontaneous and unlabored. Breath sounds clear and equal bilaterally with regular rhythm.

## 2023-11-27 NOTE — H&P ADULT - NSHPLABSRESULTS_GEN_ALL_CORE
chlorhexidine 0.12% Liquid 15 milliLiter(s) Swish and Spit once  chlorhexidine 4% Liquid 1 Application(s) Topical once  sodium chloride 0.9% lock flush 3 milliLiter(s) IV Push every 8 hours  sodium chloride 0.9%. 1000 milliLiter(s) IV Continuous <Continuous>                            11.1   7.99  )-----------( 134      ( 27 Nov 2023 22:08 )             34.0       Hemoglobin: 11.1 g/dL (11-27 @ 22:08)      11-27    139  |  108  |  73<H>  ----------------------------<  129<H>  5.9<H>   |  16<L>  |  1.98<H>    Ca    9.5      27 Nov 2023 22:08    TPro  8.0  /  Alb  4.9  /  TBili  0.2  /  DBili  x   /  AST  23  /  ALT  20  /  AlkPhos  92  11-27    Creatinine Trend: 1.98<--    COAGS: PT/INR - ( 27 Nov 2023 22:08 )   PT: 18.5 sec;   INR: 1.71 ratio                   T(C): 36.2 (11-27-23 @ 23:00), Max: 36.4 (11-27-23 @ 16:15)  HR: 83 (11-27-23 @ 23:15) (83 - 113)  BP: 103/49 (11-27-23 @ 23:15) (103/49 - 179/89)  RR: 19 (11-27-23 @ 23:15) (16 - 28)  SpO2: 98% (11-27-23 @ 23:15) (97% - 99%)  Wt(kg): --    I&O's Summary

## 2023-11-27 NOTE — ED PROVIDER NOTE - CLINICAL SUMMARY MEDICAL DECISION MAKING FREE TEXT BOX
Chantal Guadalupe is an 83 y.o. M PMHx significant for epilepsy, HTN, BPH, hypothyroidism, who presents to the ED s/p fall to pavement approximately 15:00 today. Given HPI and Physical exam, concerning differentials include but not limited to SAH, epidural hematoma, orbital and/or other acute facial bone fracture, retrobulbar hematoma. Given these findings, decreased visual acuity as compared to the R. eye and, increased IOP, severe swelling over the L. eye, immediate CT scan of the head, neck, orbit and face will be obtained. Trauma labs including cbc, cmp will also be obtained.

## 2023-11-27 NOTE — H&P ADULT - HISTORY OF PRESENT ILLNESS
Chantal Guadalupe is an 83 y.o. M PMHx significant for epilepsy, HTN, BPH, hypothyroidism, who presents to the ED s/p fall to pavement approximately 15:00 today.  Patient had direct impact to left side of face presents with severe ecchymosis of the left eye which is closed shut at this time.  Son who accompanies patient and translates as patient is non-English speaking, states patient was walking with a umbrella 5 steps in front of him.  Umbrella slipped causing him to fall to the floor.  Patient's son states prior to fall patient was not lightheaded or dizzy. He denies LOC after fall.  Patient did not appear to be confused after fall.  Additionally, c.o. pain to the L. knee which has a superficial knee abrasion.  Denies HA, CP, SOB, ABD pain, or any other complaints at this time. Pt does not wear corrective lens.

## 2023-11-27 NOTE — H&P ADULT - ASSESSMENT
Chantal Guadalupe is an 83 y.o. M PMHx significant for epilepsy, HTN, BPH, hypothyroidism, who presents to the ED s/p fall to pavement.   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   Trauma consulted for s/p fall , pt is being seen by opthalmology with no increase in EYE pressure and no acute pathology .   Ct scan reviewed by Dr Contreras .

## 2023-11-27 NOTE — CONSULT NOTE ADULT - ASSESSMENT
---NOTE INCOMPLETE ---     83M with PMHx significant for epilepsy, HTN, BPH, hypothyroidism, who presents to the ED s/p fall to pavement approximately 15:00, found to have left periorbital edema/ecchymoses.     # Left eye trauma  - S/p mechanical fall found to have periorbital ecchymoses/edema  - VA 20/25 OD 20/25 OS ; IOP 14 OD 16 OS ; no rAPD OU   - EOMs full OU, CVF full OU, color plates full OU   - Anterior exam (left eye) with AC deep and formed, kristi negative  - DFE (left eye) with ***  - Anterior exam and DFE (right eye) wnl   - Recommend ***       Outpatient Follow-up: Patient should follow-up with his/her ophthalmologist or with [facility] Department of Ophthalmology within 1 week of after discharge at:    [address]  [place] [address]  [phone] ---NOTE INCOMPLETE ---     83M with PMHx significant for epilepsy, HTN, BPH, hypothyroidism, who presents to the ED s/p fall to pavement approximately 15:00, found to have left periorbital edema/ecchymoses.     # Left eye trauma  - S/p mechanical fall found to have periorbital ecchymoses/edema  - VA 20/25 OD 20/25 OS ; IOP 14 OD 16 OS ; no rAPD OU   - EOMs full OU, CVF full OU, color plates full OU   - Anterior exam (left eye) with AC deep and formed, kristi negative  - DFE (left eye) with ***  - Anterior exam and DFE (right eye) wnl   - Recommend ***       Outpatient Follow-up: Patient should follow-up with his/her ophthalmologist or with [facility] Department of Ophthalmology within 1 week of after discharge at:    [address]  [place] [address]  [phone] ---NOTE INCOMPLETE ---     83M with PMHx significant for epilepsy, HTN, BPH, hypothyroidism, who presents to the ED s/p fall to pavement approximately 15:00, found to have left periorbital edema/ecchymoses.     # Left eye trauma  - S/p mechanical fall found to have periorbital ecchymoses/edema  - VA 20/25 OD 20/25 OS ; IOP 14 OD 16 OS ; no rAPD OU   - EOMs full OU, CVF full OU, color plates full OU   - Anterior exam (left eye) with AC deep and formed, kristi negative  - DFE (left eye) with ***  - Anterior exam and DFE (right eye) wnl   - Recommend ***       Outpatient Follow-up: Patient should follow-up with his/her ophthalmologist or with [facility] Department of Ophthalmology within 1 week of after discharge at:    [address]  [place] [address]  [phone] ---NOTE INCOMPLETE ---     83M with PMHx significant for epilepsy, HTN, BPH, hypothyroidism, who presents to the ED s/p fall to pavement approximately 15:00, found to have left periorbital edema/ecchymoses.     # Left eye trauma  - S/p mechanical fall found to have periorbital ecchymoses/edema  - VA (cc with +2.50 readers) 20/25 OD 20/30 OS ; IOP 14 OD 16 OS ; no rAPD OU   - EOMs full OU, CVF full OU, color plates full OU   - Anterior exam (left eye) with AC deep and formed, kristi negative  - DFE (left eye) with ***  - Anterior exam and DFE (right eye) wnl   - Recommend ***       Outpatient Follow-up: Patient should follow-up with his/her ophthalmologist or with [place] [facility] Department of Ophthalmology within 1 week of after discharge at:    [address]  [place] [address]  [phone] ---NOTE INCOMPLETE ---     83M with PMHx significant for epilepsy, HTN, BPH, hypothyroidism, who presents to the ED s/p fall to pavement approximately 15:00, found to have left periorbital edema/ecchymoses.     # Left eye trauma  - S/p mechanical fall found to have periorbital ecchymoses/edema  - VA (cc with +2.50 readers) 20/25 OD 20/30 OS ; IOP 14 OD 16 OS ; no rAPD OU   - EOMs full OU, CVF full OU, color plates full OU   - Anterior exam (left eye) with AC deep and formed, kristi negative  - DFE (left eye) with ***  - Anterior exam and DFE (right eye) wnl   - Recommend ***       Outpatient Follow-up: Patient should follow-up with his/her ophthalmologist or with [facility] Department of Ophthalmology within 1 week of after discharge at:    [address]  [place] [address]  [phone] ---NOTE INCOMPLETE ---     83M with PMHx significant for epilepsy, HTN, BPH, hypothyroidism, who presents to the ED s/p fall to pavement approximately 15:00, found to have left periorbital edema/ecchymoses.     # Left eye trauma  - S/p mechanical fall found to have periorbital ecchymoses/edema  - VA (cc with +2.50 readers) 20/25 OD 20/30 OS ; IOP 14 OD 16 OS ; no rAPD OU   - EOMs full OU, CVF full OU, color plates full OU   - Anterior exam (left eye) with AC deep and formed, kristi negative  - DFE (left eye) with ***  - Anterior exam and DFE (right eye) wnl   - Recommend ***       Outpatient Follow-up: Patient should follow-up with his/her ophthalmologist or with [facility] Department of Ophthalmology within 1 week of after discharge at:    [address]  [place] [address]  [phone] 83M with PMHx significant for epilepsy, HTN, BPH, hypothyroidism, who presents to the ED s/p fall to pavement approximately 15:00, found to have left periorbital edema/ecchymoses.     # Left eye trauma  # Posterior vitreous detachment, left eye   - S/p mechanical fall found to have periorbital ecchymoses/edema  - VA (cc with +2.50 readers) 20/25 OD 20/30 OS ; IOP 14 OD 16 OS ; no rAPD OU   - EOMs full OU, CVF full OU, color plates full OU   - Anterior exam (left eye) with AC deep and formed, kristi negative  - DFE (left eye) with PVD, no holes, tears or detachments  - Anterior exam and DFE (right eye) wnl   - No acute ophthalmalic intervention   - Recommend ice to left eye for the first 48hr after trauma (20 min out of each hour), HOB elevation if tolerated and approved by primary team (not necessary) to promote relief of edema  - Start artificial tears QID (ordered)  - Pt will require close outpatient follow up once ready for discharge given likely traumatic PVD OS to monitor for development of retinal pathology due to sequelae of trauma    Outpatient Follow-up: Patient should follow-up with his/her ophthalmologist or with [facility] Department of Ophthalmology within 1 week of after discharge at:    [address]  [place] [address]  [phone] 83M with PMHx significant for epilepsy, HTN, BPH, hypothyroidism, who presents to the ED s/p fall to pavement approximately 15:00, found to have left periorbital edema/ecchymoses.     # Left eye trauma  # Posterior vitreous detachment, left eye   - S/p mechanical fall found to have periorbital ecchymoses/edema  - VA (cc with +2.50 readers) 20/25 OD 20/30 OS ; IOP 14 OD 16 OS ; no rAPD OU   - EOMs full OU, CVF full OU, color plates full OU   - Anterior exam (left eye) with AC deep and formed, kristi negative  - DFE (left eye) with PVD, no holes, tears or detachments  - Anterior exam and DFE (right eye) wnl   - No acute ophthalmalic intervention   - Recommend ice to left eye for the first 48hr after trauma (20 min out of each hour), HOB elevation if tolerated and approved by primary team (not necessary) to promote relief of edema  - Start artificial tears QID (ordered)  - Pt will require close outpatient follow up once ready for discharge given likely traumatic PVD OS to monitor for development of retinal pathology due to sequelae of trauma    Outpatient Follow-up: Patient should follow-up with his/her ophthalmologist or with [facility] Department of Ophthalmology within 1 week of after discharge at:    [address]  [place] [address]  [phone] 83M with PMHx significant for epilepsy, HTN, BPH, hypothyroidism, who presents to the ED s/p fall to pavement approximately 15:00, found to have left periorbital edema/ecchymoses.     # Left eye trauma  # Posterior vitreous detachment, left eye   - S/p mechanical fall found to have periorbital ecchymoses/edema  - VA (cc with +2.50 readers) 20/25 OD 20/30 OS ; IOP 14 OD 16 OS ; no rAPD OU   - EOMs full OU, CVF full OU, color plates full OU   - Anterior exam (left eye) with AC deep and formed, kristi negative  - DFE (left eye) with PVD, no holes, tears or detachments  - Anterior exam and DFE (right eye) wnl   - No acute ophthalmalic intervention   - Recommend ice to left eye for the first 48hr after trauma (20 min out of each hour), HOB elevation if tolerated and approved by primary team (not necessary) to promote relief of edema  - Start artificial tears QID (ordered)  - Pt will require close outpatient follow up once ready for discharge given likely traumatic PVD OS to monitor for development of retinal pathology due to sequelae of trauma    Outpatient Follow-up: Patient should follow-up with his/her ophthalmologist or with [facility] Department of Ophthalmology within 1 week of after discharge at:    [address]  [place] [address]  [phone]

## 2023-11-27 NOTE — ED PROVIDER NOTE - ATTENDING CONTRIBUTION TO CARE
Cecilio Drake DO: I have personally performed a face to face medical and diagnostic evaluation of the patient. I have discussed with and reviewed the Resident's and/or ACP's and/or Medical/PA/NP student's note and agree with the History, ROS, Physical Exam and MDM unless otherwise indicated. A brief summary of my personal evaluation and impression can be found below.     Chantal Guadalupe 83m significant for epilepsy, HTN, BPH, hypothyroidism, prior hx of aortic dissection, presents ED s/p fall to pavement approximately 15:00 today.  Patient had direct impact to left side of face presents with severe ecchymosis of the left eye which is closed shut at this time.  Son who accompanies patient and translates as patient is non-English speaking, states patient was walking with a umbrella 5 steps in front of him.  Umbrella slipped causing him to fall to the floor.  Patient's son states prior to fall patient was not lightheaded or dizzy. He denies LOC after fall.  Patient did not appear to be confused after fall.  Additionally, c.o. pain to the L. knee which has a superficial knee abrasion.  Denies HA, CP, SOB, ABD pain, or any other complaints at this time. Pt does not wear corrective lens.    CONSTITUTIONAL: nad  SKIN: l eye ecchymosis, superficial abrasions over L eye, superficial abrasions over b/l knees  HEAD: NCAT  EYES: EOMI, L eye ecchymosis w/o proptosis R eye 20/60 L eye 20/100, R eye iop 21 L eye iop 30  ENT: normal pharynx with no erythema or exudates  NECK: Supple; non tender. Full ROM.  CARD: RRR, no murmurs.  RESP: clear to ausculation b/l. No crackles or wheezing.  ABD: soft, non-tender, non-distended, no rebound or guarding.  MSK: Full ROM, no bony tenderness, no pedal edema, no calf tenderness  NEURO: normal motor. normal sensory.   PSYCH: Cooperative, appropriate.    concern for possible retrobulbar hematoma w/ elevated iop, w/ no proptosis and soft eye will get emergent ct orbits/max face and ct head, xray knees, chest and pelvis xrays, preop labs, look for possible infectious/metabolic causes of fall however at this time mechanical fall favored, dispo pending imaging, if retrobulbar hematoma on ct will perform lateral canthotomy, will cautiously observe for now, opthalmology consulted

## 2023-11-27 NOTE — ED PROVIDER NOTE - PHYSICAL EXAMINATION
GEN: Patient awake. No acute distress, non-toxic.  Head: no scalp laceration. severe ecchymosis to the L. eye, which is closed shut. Bleeding over the L. eyebrow with superficial abrasion. No blood at the ears, no lowe sign  Neck: Nontender, full ROM.  Eyes: R. eye is normal in appearance, 20/60. L eye: swollen shut 20/100, IOP L. eye: 30-31 conjunctival injection.   ENT: Nasal passages patent without blood or drainage. Throat  uvula midline  CARDIAC: RRR  PULM: CTA B/L   ABD: Soft, nontender, nondistended  MSK: Moving all extremities spontaneously  NEURO: no focal neurological deficits  SKIN: Warm, dry. Abrasion over the L. knee, not actively bleeding

## 2023-11-27 NOTE — ED ADULT NURSE NOTE - OBJECTIVE STATEMENT
82 yo M AOx3 from home with PMH of epilepsy, HTN, BPH, and hypothroidsim s/p fall to pavement. Pt son at bedside states fall happened at around 1500. Pt presents with severe ecchymosis to left eye. Pt son states pt was using an umbrella to ambulate and it slipped causing the pt to fall. Pt denies LOC and use of anticoagulants. Pt presents hypertensive, MD Drake aware. 18G LAC placed by MD Drake. Pt taken to CT scan. As per MD EMILY El, Esmolol drip to be administered with GOAL of HR 60-80 and Systolic BP of 120-140. Pt taken to CTICU floor with CTICU MD, ED RN, and ED Tech.

## 2023-11-27 NOTE — H&P ADULT - NSHPPHYSICALEXAM_GEN_ALL_CORE
Sent E-advice to patient regarding suggestions from PCP to try Biotene for dry mouth symptoms, make an appointment if it doesn't resolve with OTC products. Also no labs are ordered and no repeat labs are due per last lab result notes.   alert , oriented x3   No JVD  clear yamila , no wheeze.   reg s1s2  soft , + bs , nontender  - edema  Left  eye with large hematoma.

## 2023-11-27 NOTE — ED PROVIDER NOTE - PROGRESS NOTE DETAILS
Cecilio Drake DO: pt found to have aortic dissection with concern for worsening from prior repair 15 years ago, CT surgery evaluated and requested esmolol drip, requesting urgent admission to CT icu, explained concern over eye and CT icu will continue to follow up with ophthalmology

## 2023-11-27 NOTE — CONSULT NOTE ADULT - SUBJECTIVE AND OBJECTIVE BOX
[facility] DEPARTMENT OF OPHTHALMOLOGY - INITIAL ADULT CONSULT  ----------------------------------------------------------------------------------------------------  Dylon Peterson PGY-2  Available on teams  ----------------------------------------------------------------------------------------------------    HPI: 83M with PMHx significant for epilepsy, HTN, BPH, hypothyroidism, who presents to the ED s/p fall to pavement approximately 15:00 today.  Patient had direct impact to left side of face presents with severe ecchymosis of the left eye which is closed shut at this time.  Son who accompanies patient and translates as patient is non-English speaking, states patient was walking with a umbrella 5 steps in front of him.  Umbrella slipped causing him to fall to the floor.  Patient's son states prior to fall patient was not lightheaded or dizzy. He denies LOC after fall.  Patient did not appear to be confused after fall.  Additionally, c.o. pain to the L. knee which has a superficial knee abrasion.  Denies HA, CP, SOB, ABD pain, or any other complaints at this time. Pt does not wear corrective lens.    Interval History: Pt reports vision feels intact and denies flashes, floaters, curtain-like vision loss, diplopia. Denies hx of eye surgery, cannot recall the last time he saw an eye doctor.    PAST MEDICAL & SURGICAL HISTORY:  Hypertension      Seizure        Past Ocular History: None known  Ophthalmic Medications: None  FAMILY HISTORY:    MEDICATIONS  (STANDING):  chlorhexidine 0.12% Liquid 15 milliLiter(s) Swish and Spit once  chlorhexidine 4% Liquid 1 Application(s) Topical once  dextrose 50% Injectable 50 milliLiter(s) IV Push once  insulin regular  human recombinant 5 Unit(s) IV Push once  sodium bicarbonate  Injectable 50 milliEquivalent(s) IV Push once  sodium bicarbonate  Injectable 50 milliEquivalent(s) IV Push once  sodium chloride 0.9% lock flush 3 milliLiter(s) IV Push every 8 hours  sodium chloride 0.9%. 1000 milliLiter(s) (75 mL/Hr) IV Continuous <Continuous>    MEDICATIONS  (PRN):    Allergies & Intolerances:   No Known Allergies    Review of Systems:  Constitutional: No fever, chills  Eyes: See HPI   Neuro: No tremors  Cardiovascular: No chest pain, palpitations  Respiratory: No SOB, no cough  GI: No nausea, vomiting, abdominal pain  : No dysuria  Skin: no rash  Psych: no depression  Endocrine: no polyuria, polydipsia  Heme/lymph: no swelling    VITALS: T(C): 36.2 (11-27-23 @ 23:00)  T(F): 97.1 (11-27-23 @ 23:00), Max: 97.6 (11-27-23 @ 16:15)  HR: 83 (11-27-23 @ 23:15) (83 - 113)  BP: 103/49 (11-27-23 @ 23:15) (103/49 - 179/89)  RR:  (16 - 28)  SpO2:  (97% - 99%)  Wt(kg): --  General: AAO x 3, appropriate mood and affect    Ophthalmology Exam:  Visual acuity (cc with +2.50 readers): 20/25 OD ; 20/30 OS   Pupils: PERRL OU, no APD  Ttono: 14 OD 16 OS   Extraocular movements (EOMs): Full OU, no pain, no diplopia  Confrontational Visual Field (CVF): Full OU  Color Plates: 12/12 OU    Pen Light Exam (PLE)  External: Flat OD ; periorbital edema and ecchymoses OS   Lids/Lashes/Lacrimal Ducts: Flat OD ; lid edema and ecchymoses OS   Sclera/Conjunctiva: W+Q OD, tr subconj heme OS ***   Cornea: Cl OU ***   Anterior Chamber: D+F OU     Iris: Flat OU  Lens: Cataracts OU ***     Fundus Exam: dilated with 1% tropicamide and 2.5% phenylephrine  Approval obtained from primary team for dilation  Patient aware that pupils can remained dilated for at least 4-6 hours  Exam performed with 20D lens    Vitreous: wnl OU  Disc, cup/disc: sharp and pink, 0.4 OU  Macula: wnl OU  Vessels: wnl OU  Periphery: wnl OU    Labs/Imaging:  CT MAXILLOFACIAL  IC   ORDERED BY:  SHALONDA DIEHL   CT CERVICAL SPINE   ORDERED BY:  SHALONDA DIEHL   CT BRAIN   ORDERED BY:  SHALONDA DIEHL   CT ANGIO NECK (W)AW IC   ORDERED BY:  SHALONDA DIEHL   CT ANGIO BRAIN (W)AW IC   ORDERED BY:  SHALONDA DIEHL   PROCEDURE DATE:  11/27/2023      IMPRESSION:    Noncontrast CT Head: No acute intracranial hemorrhage or calvarial   fracture.    CT maxillofacial: No acute maxillofacial bone fracture. Left periorbital   soft tissue swelling at left frontal scalp hematoma. Foci of hyperdensity   within the left frontal scalp hematoma, most consistent with active   bleeding.    CT cervical spine: No acute cervical spine fracture or evidence of   traumatic malalignment.    CTA Neck: Noncalcified atherosclerotic plaque at the proximal left   internal carotid artery with short segment flap-like opacity, which may   represent sequelae of an age-indeterminate dissection. No significant   flow-limiting stenosis within the cervical carotid or vertebral arteries.    Partially imaged thoracic aortic dissection. Correlate with CTA chest   performed concurrently.    CTA Head: No proximal large vessel occlusion orsignificant stenosis.     [facility] DEPARTMENT OF OPHTHALMOLOGY - INITIAL ADULT CONSULT  ----------------------------------------------------------------------------------------------------  Dylon Peterson PGY-2  Available on teams  ----------------------------------------------------------------------------------------------------    HPI: 83M with PMHx significant for epilepsy, HTN, BPH, hypothyroidism, who presents to the ED s/p fall to pavement approximately 15:00 today.  Patient had direct impact to left side of face presents with severe ecchymosis of the left eye which is closed shut at this time.  Son who accompanies patient and translates as patient is non-English speaking, states patient was walking with a umbrella 5 steps in front of him.  Umbrella slipped causing him to fall to the floor.  Patient's son states prior to fall patient was not lightheaded or dizzy. He denies LOC after fall.  Patient did not appear to be confused after fall.  Additionally, c.o. pain to the L. knee which has a superficial knee abrasion.  Denies HA, CP, SOB, ABD pain, or any other complaints at this time. Pt does not wear corrective lens.    Interval History: Pt reports vision feels intact and denies flashes, floaters, curtain-like vision loss, diplopia. Denies hx of eye surgery, cannot recall the last time he saw an eye doctor.    PAST MEDICAL & SURGICAL HISTORY:  Hypertension      Seizure        Past Ocular History: None known  Ophthalmic Medications: None  FAMILY HISTORY:    MEDICATIONS  (STANDING):  chlorhexidine 0.12% Liquid 15 milliLiter(s) Swish and Spit once  chlorhexidine 4% Liquid 1 Application(s) Topical once  dextrose 50% Injectable 50 milliLiter(s) IV Push once  insulin regular  human recombinant 5 Unit(s) IV Push once  sodium bicarbonate  Injectable 50 milliEquivalent(s) IV Push once  sodium bicarbonate  Injectable 50 milliEquivalent(s) IV Push once  sodium chloride 0.9% lock flush 3 milliLiter(s) IV Push every 8 hours  sodium chloride 0.9%. 1000 milliLiter(s) (75 mL/Hr) IV Continuous <Continuous>    MEDICATIONS  (PRN):    Allergies & Intolerances:   No Known Allergies    Review of Systems:  Constitutional: No fever, chills  Eyes: See HPI   Neuro: No tremors  Cardiovascular: No chest pain, palpitations  Respiratory: No SOB, no cough  GI: No nausea, vomiting, abdominal pain  : No dysuria  Skin: no rash  Psych: no depression  Endocrine: no polyuria, polydipsia  Heme/lymph: no swelling    VITALS: T(C): 36.2 (11-27-23 @ 23:00)  T(F): 97.1 (11-27-23 @ 23:00), Max: 97.6 (11-27-23 @ 16:15)  HR: 83 (11-27-23 @ 23:15) (83 - 113)  BP: 103/49 (11-27-23 @ 23:15) (103/49 - 179/89)  RR:  (16 - 28)  SpO2:  (97% - 99%)  Wt(kg): --  General: AAO x 3, appropriate mood and affect    Ophthalmology Exam:  Visual acuity (cc with +2.50 readers): 20/25 OD ; 20/30 OS   Pupils: PERRL OU, no APD  Ttono: 14 OD 16 OS   Extraocular movements (EOMs): Full OU, no pain, no diplopia  Confrontational Visual Field (CVF): Full OU  Color Plates: 12/12 OU    Pen Light Exam (PLE)  External: Flat OD ; periorbital edema and ecchymoses OS   Lids/Lashes/Lacrimal Ducts: Flat OD ; lid edema and ecchymoses OS   Sclera/Conjunctiva: W+Q OD, tr subconj heme OS ***   Cornea: Cl OU ***   Anterior Chamber: D+F OU     Iris: Flat OU  Lens: Cataracts OU ***     Fundus Exam: dilated with 1% tropicamide and 2.5% phenylephrine  Approval obtained from primary team for dilation  Patient aware that pupils can remained dilated for at least 4-6 hours  Exam performed with 20D lens    Vitreous: wnl OU  Disc, cup/disc: sharp and pink, 0.4 OU  Macula: wnl OU  Vessels: wnl OU  Periphery: wnl OU    Labs/Imaging:  CT MAXILLOFACIAL  IC   ORDERED BY:  SHALONDA DIEHL   CT CERVICAL SPINE   ORDERED BY:  SHALONDA DIEHL   CT BRAIN   ORDERED BY:  SHALONDA DIEHL   CT ANGIO NECK (W)AW IC   ORDERED BY:  SHALONDA DIEHL   CT ANGIO BRAIN (W)AW IC   ORDERED BY:  SHALONDA DIEHL   PROCEDURE DATE:  11/27/2023      IMPRESSION:    Noncontrast CT Head: No acute intracranial hemorrhage or calvarial   fracture.    CT maxillofacial: No acute maxillofacial bone fracture. Left periorbital   soft tissue swelling at left frontal scalp hematoma. Foci of hyperdensity   within the left frontal scalp hematoma, most consistent with active   bleeding.    CT cervical spine: No acute cervical spine fracture or evidence of   traumatic malalignment.    CTA Neck: Noncalcified atherosclerotic plaque at the proximal left   internal carotid artery with short segment flap-like opacity, which may   represent sequelae of an age-indeterminate dissection. No significant   flow-limiting stenosis within the cervical carotid or vertebral arteries.    Partially imaged thoracic aortic dissection. Correlate with CTA chest   performed concurrently.    CTA Head: No proximal large vessel occlusion orsignificant stenosis.     [facility] DEPARTMENT OF OPHTHALMOLOGY - INITIAL ADULT CONSULT  ----------------------------------------------------------------------------------------------------  Dylon Peterson PGY-2  Available on teams  ----------------------------------------------------------------------------------------------------    HPI: 83M with PMHx significant for epilepsy, HTN, BPH, hypothyroidism, who presents to the ED s/p fall to pavement approximately 15:00 today.  Patient had direct impact to left side of face presents with severe ecchymosis of the left eye which is closed shut at this time.  Son who accompanies patient and translates as patient is non-English speaking, states patient was walking with a umbrella 5 steps in front of him.  Umbrella slipped causing him to fall to the floor.  Patient's son states prior to fall patient was not lightheaded or dizzy. He denies LOC after fall.  Patient did not appear to be confused after fall.  Additionally, c.o. pain to the L. knee which has a superficial knee abrasion.  Denies HA, CP, SOB, ABD pain, or any other complaints at this time. Pt does not wear corrective lens.    Interval History: Pt reports vision feels intact and denies flashes, floaters, curtain-like vision loss, diplopia. Denies hx of eye surgery, cannot recall the last time he saw an eye doctor.    PAST MEDICAL & SURGICAL HISTORY:  Hypertension      Seizure        Past Ocular History: None known  Ophthalmic Medications: None  FAMILY HISTORY:    MEDICATIONS  (STANDING):  chlorhexidine 0.12% Liquid 15 milliLiter(s) Swish and Spit once  chlorhexidine 4% Liquid 1 Application(s) Topical once  dextrose 50% Injectable 50 milliLiter(s) IV Push once  insulin regular  human recombinant 5 Unit(s) IV Push once  sodium bicarbonate  Injectable 50 milliEquivalent(s) IV Push once  sodium bicarbonate  Injectable 50 milliEquivalent(s) IV Push once  sodium chloride 0.9% lock flush 3 milliLiter(s) IV Push every 8 hours  sodium chloride 0.9%. 1000 milliLiter(s) (75 mL/Hr) IV Continuous <Continuous>    MEDICATIONS  (PRN):    Allergies & Intolerances:   No Known Allergies    Review of Systems:  Constitutional: No fever, chills  Eyes: See HPI   Neuro: No tremors  Cardiovascular: No chest pain, palpitations  Respiratory: No SOB, no cough  GI: No nausea, vomiting, abdominal pain  : No dysuria  Skin: no rash  Psych: no depression  Endocrine: no polyuria, polydipsia  Heme/lymph: no swelling    VITALS: T(C): 36.2 (11-27-23 @ 23:00)  T(F): 97.1 (11-27-23 @ 23:00), Max: 97.6 (11-27-23 @ 16:15)  HR: 83 (11-27-23 @ 23:15) (83 - 113)  BP: 103/49 (11-27-23 @ 23:15) (103/49 - 179/89)  RR:  (16 - 28)  SpO2:  (97% - 99%)  Wt(kg): --  General: AAO x 3, appropriate mood and affect    Ophthalmology Exam:  Visual acuity (cc with +2.50 readers): 20/25 OD ; 20/30 OS   Pupils: PERRL OU, no APD  Ttono: 14 OD 16 OS   Extraocular movements (EOMs): Full OU, no pain, no diplopia  Confrontational Visual Field (CVF): Full OU  Color Plates: 12/12 OU    Pen Light Exam (PLE)  External: Flat OD ; periorbital edema and ecchymoses OS   Lids/Lashes/Lacrimal Ducts: Flat OD ; lid edema and ecchymoses OS   Sclera/Conjunctiva: W+Q OD, tr subconj heme OS ***   Cornea: Cl OU ***   Anterior Chamber: D+F OU     Iris: Flat OU  Lens: Cataracts OU ***     Fundus Exam: dilated with 1% tropicamide and 2.5% phenylephrine  Approval obtained from primary team for dilation  Patient aware that pupils can remained dilated for at least 4-6 hours  Exam performed with 20D lens    Vitreous: wnl OU  Disc, cup/disc: sharp and pink, 0.4 OU  Macula: wnl OU  Vessels: wnl OU  Periphery: wnl OU    Labs/Imaging:  CT MAXILLOFACIAL  IC   ORDERED BY:  SHALONDA DIEHL   CT CERVICAL SPINE   ORDERED BY:  SHALONDA DIEHL   CT BRAIN   ORDERED BY:  SHALONDA DIEHL   CT ANGIO NECK (W)AW IC   ORDERED BY:  SHALONDA DIEHL   CT ANGIO BRAIN (W)AW IC   ORDERED BY:  SHALONDA DIEHL   PROCEDURE DATE:  11/27/2023      IMPRESSION:    Noncontrast CT Head: No acute intracranial hemorrhage or calvarial   fracture.    CT maxillofacial: No acute maxillofacial bone fracture. Left periorbital   soft tissue swelling at left frontal scalp hematoma. Foci of hyperdensity   within the left frontal scalp hematoma, most consistent with active   bleeding.    CT cervical spine: No acute cervical spine fracture or evidence of   traumatic malalignment.    CTA Neck: Noncalcified atherosclerotic plaque at the proximal left   internal carotid artery with short segment flap-like opacity, which may   represent sequelae of an age-indeterminate dissection. No significant   flow-limiting stenosis within the cervical carotid or vertebral arteries.    Partially imaged thoracic aortic dissection. Correlate with CTA chest   performed concurrently.    CTA Head: No proximal large vessel occlusion orsignificant stenosis.     [facility] DEPARTMENT OF OPHTHALMOLOGY - INITIAL ADULT CONSULT  ----------------------------------------------------------------------------------------------------  Dylon Peterson PGY-2  Available on teams  ----------------------------------------------------------------------------------------------------    HPI: 83M with PMHx significant for epilepsy, HTN, BPH, hypothyroidism, who presents to the ED s/p fall to pavement approximately 15:00 today.  Patient had direct impact to left side of face presents with severe ecchymosis of the left eye which is closed shut at this time.  Son who accompanies patient and translates as patient is non-English speaking, states patient was walking with a umbrella 5 steps in front of him.  Umbrella slipped causing him to fall to the floor.  Patient's son states prior to fall patient was not lightheaded or dizzy. He denies LOC after fall.  Patient did not appear to be confused after fall.  Additionally, c.o. pain to the L. knee which has a superficial knee abrasion.  Denies HA, CP, SOB, ABD pain, or any other complaints at this time. Pt does not wear corrective lens.    Interval History: Pt's son at bedside to assist with interpretation. Pt reports vision feels intact and denies flashes, floaters, curtain-like vision loss, diplopia. Denies hx of eye surgery, cannot recall the last time he saw an eye doctor.     PAST MEDICAL & SURGICAL HISTORY:  Hypertension      Seizure        Past Ocular History: None known  Ophthalmic Medications: None  FAMILY HISTORY:    MEDICATIONS  (STANDING):  chlorhexidine 0.12% Liquid 15 milliLiter(s) Swish and Spit once  chlorhexidine 4% Liquid 1 Application(s) Topical once  dextrose 50% Injectable 50 milliLiter(s) IV Push once  insulin regular  human recombinant 5 Unit(s) IV Push once  sodium bicarbonate  Injectable 50 milliEquivalent(s) IV Push once  sodium bicarbonate  Injectable 50 milliEquivalent(s) IV Push once  sodium chloride 0.9% lock flush 3 milliLiter(s) IV Push every 8 hours  sodium chloride 0.9%. 1000 milliLiter(s) (75 mL/Hr) IV Continuous <Continuous>    MEDICATIONS  (PRN):    Allergies & Intolerances:   No Known Allergies    Review of Systems:  Constitutional: No fever, chills  Eyes: See HPI   Neuro: No tremors  Cardiovascular: No chest pain, palpitations  Respiratory: No SOB, no cough  GI: No nausea, vomiting, abdominal pain  : No dysuria  Skin: no rash  Psych: no depression  Endocrine: no polyuria, polydipsia  Heme/lymph: no swelling    VITALS: T(C): 36.2 (11-27-23 @ 23:00)  T(F): 97.1 (11-27-23 @ 23:00), Max: 97.6 (11-27-23 @ 16:15)  HR: 83 (11-27-23 @ 23:15) (83 - 113)  BP: 103/49 (11-27-23 @ 23:15) (103/49 - 179/89)  RR:  (16 - 28)  SpO2:  (97% - 99%)  Wt(kg): --  General: AAO x 3, appropriate mood and affect    Ophthalmology Exam:  Visual acuity (cc with +2.50 readers): 20/25 OD ; 20/30 OS   Pupils: PERRL OU, no APD  Ttono: 14 OD 16 OS   Extraocular movements (EOMs): Full OU, no pain, no diplopia  Confrontational Visual Field (CVF): Full OU  Color Plates: 12/12 OU    Pen Light Exam (PLE)  External: Flat OD ; periorbital edema and ecchymoses OS   Lids/Lashes/Lacrimal Ducts: Flat OD ; lid edema and ecchymoses OS   Sclera/Conjunctiva: W+Q OU   Cornea: Cl OD, tr SPK OS  Anterior Chamber: D+F OU     Iris: Flat OU  Lens: Cataracts OU     Fundus Exam: dilated with 1% tropicamide and 2.5% phenylephrine  Approval obtained from primary team for dilation  Patient aware that pupils can remained dilated for at least 4-6 hours  Exam performed with 20D lens    Vitreous: wnl OD ; PVD OS   Disc, cup/disc: sharp and pink, 0.3 OD 0.4 OS  Macula: wnl OU  Vessels: wnl OU  Periphery: wnl OU    Labs/Imaging:  CT MAXILLOFACIAL  IC   ORDERED BY:  SHALONDA DIEHL   CT CERVICAL SPINE   ORDERED BY:  SHALONDA DIEHL   CT BRAIN   ORDERED BY:  SHALONDA DIEHL   CT ANGIO NECK (W)AW IC   ORDERED BY:  SHALONDA DIEHL   CT ANGIO BRAIN (W)AW IC   ORDERED BY:  SHALONDA DIEHL   PROCEDURE DATE:  11/27/2023      IMPRESSION:    Noncontrast CT Head: No acute intracranial hemorrhage or calvarial   fracture.    CT maxillofacial: No acute maxillofacial bone fracture. Left periorbital   soft tissue swelling at left frontal scalp hematoma. Foci of hyperdensity   within the left frontal scalp hematoma, most consistent with active   bleeding.    CT cervical spine: No acute cervical spine fracture or evidence of   traumatic malalignment.    CTA Neck: Noncalcified atherosclerotic plaque at the proximal left   internal carotid artery with short segment flap-like opacity, which may   represent sequelae of an age-indeterminate dissection. No significant   flow-limiting stenosis within the cervical carotid or vertebral arteries.    Partially imaged thoracic aortic dissection. Correlate with CTA chest   performed concurrently.    CTA Head: No proximal large vessel occlusion orsignificant stenosis.     [facility] DEPARTMENT OF OPHTHALMOLOGY - INITIAL ADULT CONSULT  ----------------------------------------------------------------------------------------------------  Dylon Peterson PGY-2  Available on teams  ----------------------------------------------------------------------------------------------------    HPI: 83M with PMHx significant for epilepsy, HTN, BPH, hypothyroidism, who presents to the ED s/p fall to pavement approximately 15:00 today.  Patient had direct impact to left side of face presents with severe ecchymosis of the left eye which is closed shut at this time.  Son who accompanies patient and translates as patient is non-English speaking, states patient was walking with a umbrella 5 steps in front of him.  Umbrella slipped causing him to fall to the floor.  Patient's son states prior to fall patient was not lightheaded or dizzy. He denies LOC after fall.  Patient did not appear to be confused after fall.  Additionally, c.o. pain to the L. knee which has a superficial knee abrasion.  Denies HA, CP, SOB, ABD pain, or any other complaints at this time. Pt does not wear corrective lens.    Interval History: Pt's son at bedside to assist with interpretation. Pt reports vision feels intact and denies flashes, floaters, curtain-like vision loss, diplopia. Denies hx of eye surgery, cannot recall the last time he saw an eye doctor.     PAST MEDICAL & SURGICAL HISTORY:  Hypertension      Seizure        Past Ocular History: None known  Ophthalmic Medications: None  FAMILY HISTORY:    MEDICATIONS  (STANDING):  chlorhexidine 0.12% Liquid 15 milliLiter(s) Swish and Spit once  chlorhexidine 4% Liquid 1 Application(s) Topical once  dextrose 50% Injectable 50 milliLiter(s) IV Push once  insulin regular  human recombinant 5 Unit(s) IV Push once  sodium bicarbonate  Injectable 50 milliEquivalent(s) IV Push once  sodium bicarbonate  Injectable 50 milliEquivalent(s) IV Push once  sodium chloride 0.9% lock flush 3 milliLiter(s) IV Push every 8 hours  sodium chloride 0.9%. 1000 milliLiter(s) (75 mL/Hr) IV Continuous <Continuous>    MEDICATIONS  (PRN):    Allergies & Intolerances:   No Known Allergies    Review of Systems:  Constitutional: No fever, chills  Eyes: See HPI   Neuro: No tremors  Cardiovascular: No chest pain, palpitations  Respiratory: No SOB, no cough  GI: No nausea, vomiting, abdominal pain  : No dysuria  Skin: no rash  Psych: no depression  Endocrine: no polyuria, polydipsia  Heme/lymph: no swelling    VITALS: T(C): 36.2 (11-27-23 @ 23:00)  T(F): 97.1 (11-27-23 @ 23:00), Max: 97.6 (11-27-23 @ 16:15)  HR: 83 (11-27-23 @ 23:15) (83 - 113)  BP: 103/49 (11-27-23 @ 23:15) (103/49 - 179/89)  RR:  (16 - 28)  SpO2:  (97% - 99%)  Wt(kg): --  General: AAO x 3, appropriate mood and affect    Ophthalmology Exam:  Visual acuity (cc with +2.50 readers): 20/25 OD ; 20/30 OS   Pupils: PERRL OU, no APD  Ttono: 14 OD 16 OS   Extraocular movements (EOMs): Full OU, no pain, no diplopia  Confrontational Visual Field (CVF): Full OU  Color Plates: 12/12 OU    Pen Light Exam (PLE)  External: Flat OD ; periorbital edema and ecchymoses OS   Lids/Lashes/Lacrimal Ducts: Flat OD ; lid edema and ecchymoses OS   Sclera/Conjunctiva: W+Q OU   Cornea: Cl OD, tr SPK OS  Anterior Chamber: D+F OU     Iris: Flat OU  Lens: Cataracts OU     Fundus Exam: dilated with 1% tropicamide and 2.5% phenylephrine  Approval obtained from primary team for dilation  Patient aware that pupils can remained dilated for at least 4-6 hours  Exam performed with 20D lens    Vitreous: wnl OD ; PVD OS   Disc, cup/disc: sharp and pink, 0.3 OD 0.4 OS  Macula: wnl OU  Vessels: wnl OU  Periphery: wnl OU    Labs/Imaging:  CT MAXILLOFACIAL  IC   ORDERED BY:  SHALONDA DIEHL   CT CERVICAL SPINE   ORDERED BY:  SHALONDA DIEHL   CT BRAIN   ORDERED BY:  SHALONDA DIEHL   CT ANGIO NECK (W)AW IC   ORDERED BY:  SHALONDA DIEHL   CT ANGIO BRAIN (W)AW IC   ORDERED BY:  SHALONDA DIEHL   PROCEDURE DATE:  11/27/2023      IMPRESSION:    Noncontrast CT Head: No acute intracranial hemorrhage or calvarial   fracture.    CT maxillofacial: No acute maxillofacial bone fracture. Left periorbital   soft tissue swelling at left frontal scalp hematoma. Foci of hyperdensity   within the left frontal scalp hematoma, most consistent with active   bleeding.    CT cervical spine: No acute cervical spine fracture or evidence of   traumatic malalignment.    CTA Neck: Noncalcified atherosclerotic plaque at the proximal left   internal carotid artery with short segment flap-like opacity, which may   represent sequelae of an age-indeterminate dissection. No significant   flow-limiting stenosis within the cervical carotid or vertebral arteries.    Partially imaged thoracic aortic dissection. Correlate with CTA chest   performed concurrently.    CTA Head: No proximal large vessel occlusion orsignificant stenosis.     [facility] DEPARTMENT OF OPHTHALMOLOGY - INITIAL ADULT CONSULT  ----------------------------------------------------------------------------------------------------  Dylon Peterson PGY-2  Available on teams  ----------------------------------------------------------------------------------------------------    HPI: 83M with PMHx significant for epilepsy, HTN, BPH, hypothyroidism, who presents to the ED s/p fall to pavement approximately 15:00 today.  Patient had direct impact to left side of face presents with severe ecchymosis of the left eye which is closed shut at this time.  Son who accompanies patient and translates as patient is non-English speaking, states patient was walking with a umbrella 5 steps in front of him.  Umbrella slipped causing him to fall to the floor.  Patient's son states prior to fall patient was not lightheaded or dizzy. He denies LOC after fall.  Patient did not appear to be confused after fall.  Additionally, c.o. pain to the L. knee which has a superficial knee abrasion.  Denies HA, CP, SOB, ABD pain, or any other complaints at this time. Pt does not wear corrective lens.    Interval History: Pt's son at bedside to assist with interpretation. Pt reports vision feels intact and denies flashes, floaters, curtain-like vision loss, diplopia. Denies hx of eye surgery, cannot recall the last time he saw an eye doctor.     PAST MEDICAL & SURGICAL HISTORY:  Hypertension      Seizure        Past Ocular History: None known  Ophthalmic Medications: None  FAMILY HISTORY:    MEDICATIONS  (STANDING):  chlorhexidine 0.12% Liquid 15 milliLiter(s) Swish and Spit once  chlorhexidine 4% Liquid 1 Application(s) Topical once  dextrose 50% Injectable 50 milliLiter(s) IV Push once  insulin regular  human recombinant 5 Unit(s) IV Push once  sodium bicarbonate  Injectable 50 milliEquivalent(s) IV Push once  sodium bicarbonate  Injectable 50 milliEquivalent(s) IV Push once  sodium chloride 0.9% lock flush 3 milliLiter(s) IV Push every 8 hours  sodium chloride 0.9%. 1000 milliLiter(s) (75 mL/Hr) IV Continuous <Continuous>    MEDICATIONS  (PRN):    Allergies & Intolerances:   No Known Allergies    Review of Systems:  Constitutional: No fever, chills  Eyes: See HPI   Neuro: No tremors  Cardiovascular: No chest pain, palpitations  Respiratory: No SOB, no cough  GI: No nausea, vomiting, abdominal pain  : No dysuria  Skin: no rash  Psych: no depression  Endocrine: no polyuria, polydipsia  Heme/lymph: no swelling    VITALS: T(C): 36.2 (11-27-23 @ 23:00)  T(F): 97.1 (11-27-23 @ 23:00), Max: 97.6 (11-27-23 @ 16:15)  HR: 83 (11-27-23 @ 23:15) (83 - 113)  BP: 103/49 (11-27-23 @ 23:15) (103/49 - 179/89)  RR:  (16 - 28)  SpO2:  (97% - 99%)  Wt(kg): --  General: AAO x 3, appropriate mood and affect    Ophthalmology Exam:  Visual acuity (cc with +2.50 readers): 20/25 OD ; 20/30 OS   Pupils: PERRL OU, no APD  Ttono: 14 OD 16 OS   Extraocular movements (EOMs): Full OU, no pain, no diplopia  Confrontational Visual Field (CVF): Full OU  Color Plates: 12/12 OU    Pen Light Exam (PLE)  External: Flat OD ; periorbital edema and ecchymoses OS   Lids/Lashes/Lacrimal Ducts: Flat OD ; lid edema and ecchymoses OS   Sclera/Conjunctiva: W+Q OU   Cornea: Cl OD, tr SPK OS  Anterior Chamber: D+F OU     Iris: Flat OU  Lens: Cataracts OU     Fundus Exam: dilated with 1% tropicamide and 2.5% phenylephrine  Approval obtained from primary team for dilation  Patient aware that pupils can remained dilated for at least 4-6 hours  Exam performed with 20D lens    Vitreous: wnl OD ; PVD OS   Disc, cup/disc: sharp and pink, 0.3 OD 0.4 OS  Macula: wnl OU  Vessels: wnl OU  Periphery: wnl OU    Labs/Imaging:  CT MAXILLOFACIAL  IC   ORDERED BY:  SHALONDA DIEHL   CT CERVICAL SPINE   ORDERED BY:  SHALONDA DIEHL   CT BRAIN   ORDERED BY:  SHALONDA DIEHL   CT ANGIO NECK (W)AW IC   ORDERED BY:  SHALONDA DIEHL   CT ANGIO BRAIN (W)AW IC   ORDERED BY:  SHALONDA DIEHL   PROCEDURE DATE:  11/27/2023      IMPRESSION:    Noncontrast CT Head: No acute intracranial hemorrhage or calvarial   fracture.    CT maxillofacial: No acute maxillofacial bone fracture. Left periorbital   soft tissue swelling at left frontal scalp hematoma. Foci of hyperdensity   within the left frontal scalp hematoma, most consistent with active   bleeding.    CT cervical spine: No acute cervical spine fracture or evidence of   traumatic malalignment.    CTA Neck: Noncalcified atherosclerotic plaque at the proximal left   internal carotid artery with short segment flap-like opacity, which may   represent sequelae of an age-indeterminate dissection. No significant   flow-limiting stenosis within the cervical carotid or vertebral arteries.    Partially imaged thoracic aortic dissection. Correlate with CTA chest   performed concurrently.    CTA Head: No proximal large vessel occlusion orsignificant stenosis.

## 2023-11-27 NOTE — ED PROVIDER NOTE - OBJECTIVE STATEMENT
Chantal Guadalupe is an 83 y.o. M PMHx significant for epilepsy, HTN, BPH, hypothyroidism, who presents to the ED s/p fall to pavement approximately 15:00 today.  Patient had direct impact to left side of face presents with severe ecchymosis of the left eye which is closed shut at this time.  Son who accompanies patient and translates as patient is non-English speaking, states patient was walking with a umbrella 5 steps in front of him.  Umbrella slipped causing him to fall to the floor.  Patient's son states prior to fall patient was not lightheaded or dizzy. He denies LOC after fall.  Patient did not appear to be confused after fall.  Additionally, c.o. pain to the L. knee which has a superficial knee abrasion.  Denies HA, CP, SOB, ABD pain, or any other complaints at this time. Chantal Guadalupe is an 83 y.o. M PMHx significant for epilepsy, HTN, BPH, hypothyroidism, who presents to the ED s/p fall to pavement approximately 15:00 today.  Patient had direct impact to left side of face presents with severe ecchymosis of the left eye which is closed shut at this time.  Son who accompanies patient and translates as patient is non-English speaking, states patient was walking with a umbrella 5 steps in front of him.  Umbrella slipped causing him to fall to the floor.  Patient's son states prior to fall patient was not lightheaded or dizzy. He denies LOC after fall.  Patient did not appear to be confused after fall.  Additionally, c.o. pain to the L. knee which has a superficial knee abrasion.  Denies HA, CP, SOB, ABD pain, or any other complaints at this time. Pt does not wear corrective lens.

## 2023-11-28 DIAGNOSIS — Z86.79 PERSONAL HISTORY OF OTHER DISEASES OF THE CIRCULATORY SYSTEM: ICD-10-CM

## 2023-11-28 DIAGNOSIS — S05.92XA UNSPECIFIED INJURY OF LEFT EYE AND ORBIT, INITIAL ENCOUNTER: ICD-10-CM

## 2023-11-28 DIAGNOSIS — R03.0 ELEVATED BLOOD-PRESSURE READING, WITHOUT DIAGNOSIS OF HYPERTENSION: ICD-10-CM

## 2023-11-28 LAB
ALBUMIN SERPL ELPH-MCNC: 3.7 G/DL — SIGNIFICANT CHANGE UP (ref 3.3–5)
ALBUMIN SERPL ELPH-MCNC: 3.7 G/DL — SIGNIFICANT CHANGE UP (ref 3.3–5)
ALP SERPL-CCNC: 70 U/L — SIGNIFICANT CHANGE UP (ref 40–120)
ALP SERPL-CCNC: 70 U/L — SIGNIFICANT CHANGE UP (ref 40–120)
ALT FLD-CCNC: 15 U/L — SIGNIFICANT CHANGE UP (ref 10–45)
ALT FLD-CCNC: 15 U/L — SIGNIFICANT CHANGE UP (ref 10–45)
ANION GAP SERPL CALC-SCNC: 13 MMOL/L — SIGNIFICANT CHANGE UP (ref 5–17)
ANION GAP SERPL CALC-SCNC: 13 MMOL/L — SIGNIFICANT CHANGE UP (ref 5–17)
ANION GAP SERPL CALC-SCNC: 14 MMOL/L — SIGNIFICANT CHANGE UP (ref 5–17)
ANION GAP SERPL CALC-SCNC: 14 MMOL/L — SIGNIFICANT CHANGE UP (ref 5–17)
APTT BLD: 31.6 SEC — SIGNIFICANT CHANGE UP (ref 24.5–35.6)
APTT BLD: 31.6 SEC — SIGNIFICANT CHANGE UP (ref 24.5–35.6)
APTT BLD: >200 SEC — CRITICAL HIGH (ref 24.5–35.6)
APTT BLD: >200 SEC — CRITICAL HIGH (ref 24.5–35.6)
AST SERPL-CCNC: 17 U/L — SIGNIFICANT CHANGE UP (ref 10–40)
AST SERPL-CCNC: 17 U/L — SIGNIFICANT CHANGE UP (ref 10–40)
BILIRUB SERPL-MCNC: 0.2 MG/DL — SIGNIFICANT CHANGE UP (ref 0.2–1.2)
BILIRUB SERPL-MCNC: 0.2 MG/DL — SIGNIFICANT CHANGE UP (ref 0.2–1.2)
BUN SERPL-MCNC: 60 MG/DL — HIGH (ref 7–23)
BUN SERPL-MCNC: 60 MG/DL — HIGH (ref 7–23)
BUN SERPL-MCNC: 67 MG/DL — HIGH (ref 7–23)
BUN SERPL-MCNC: 67 MG/DL — HIGH (ref 7–23)
CALCIUM SERPL-MCNC: 8.1 MG/DL — LOW (ref 8.4–10.5)
CALCIUM SERPL-MCNC: 8.1 MG/DL — LOW (ref 8.4–10.5)
CALCIUM SERPL-MCNC: 8.2 MG/DL — LOW (ref 8.4–10.5)
CALCIUM SERPL-MCNC: 8.2 MG/DL — LOW (ref 8.4–10.5)
CHLORIDE SERPL-SCNC: 109 MMOL/L — HIGH (ref 96–108)
CHLORIDE SERPL-SCNC: 109 MMOL/L — HIGH (ref 96–108)
CHLORIDE SERPL-SCNC: 111 MMOL/L — HIGH (ref 96–108)
CHLORIDE SERPL-SCNC: 111 MMOL/L — HIGH (ref 96–108)
CO2 SERPL-SCNC: 16 MMOL/L — LOW (ref 22–31)
CO2 SERPL-SCNC: 16 MMOL/L — LOW (ref 22–31)
CO2 SERPL-SCNC: 17 MMOL/L — LOW (ref 22–31)
CO2 SERPL-SCNC: 17 MMOL/L — LOW (ref 22–31)
CREAT SERPL-MCNC: 1.75 MG/DL — HIGH (ref 0.5–1.3)
CREAT SERPL-MCNC: 1.75 MG/DL — HIGH (ref 0.5–1.3)
CREAT SERPL-MCNC: 1.79 MG/DL — HIGH (ref 0.5–1.3)
CREAT SERPL-MCNC: 1.79 MG/DL — HIGH (ref 0.5–1.3)
EGFR: 37 ML/MIN/1.73M2 — LOW
EGFR: 37 ML/MIN/1.73M2 — LOW
EGFR: 38 ML/MIN/1.73M2 — LOW
EGFR: 38 ML/MIN/1.73M2 — LOW
FIBRINOGEN PPP-MCNC: 102 MG/DL — LOW (ref 200–445)
FIBRINOGEN PPP-MCNC: 102 MG/DL — LOW (ref 200–445)
GAS PNL BLDA: SIGNIFICANT CHANGE UP
GLUCOSE BLDC GLUCOMTR-MCNC: 128 MG/DL — HIGH (ref 70–99)
GLUCOSE BLDC GLUCOMTR-MCNC: 128 MG/DL — HIGH (ref 70–99)
GLUCOSE BLDC GLUCOMTR-MCNC: 188 MG/DL — HIGH (ref 70–99)
GLUCOSE BLDC GLUCOMTR-MCNC: 188 MG/DL — HIGH (ref 70–99)
GLUCOSE BLDC GLUCOMTR-MCNC: 95 MG/DL — SIGNIFICANT CHANGE UP (ref 70–99)
GLUCOSE BLDC GLUCOMTR-MCNC: 95 MG/DL — SIGNIFICANT CHANGE UP (ref 70–99)
GLUCOSE SERPL-MCNC: 150 MG/DL — HIGH (ref 70–99)
GLUCOSE SERPL-MCNC: 150 MG/DL — HIGH (ref 70–99)
GLUCOSE SERPL-MCNC: 162 MG/DL — HIGH (ref 70–99)
GLUCOSE SERPL-MCNC: 162 MG/DL — HIGH (ref 70–99)
HCT VFR BLD CALC: 27.5 % — LOW (ref 39–50)
HCT VFR BLD CALC: 27.5 % — LOW (ref 39–50)
HGB BLD-MCNC: 9 G/DL — LOW (ref 13–17)
HGB BLD-MCNC: 9 G/DL — LOW (ref 13–17)
INR BLD: 1.1 RATIO — SIGNIFICANT CHANGE UP (ref 0.85–1.18)
INR BLD: 1.1 RATIO — SIGNIFICANT CHANGE UP (ref 0.85–1.18)
MAGNESIUM SERPL-MCNC: 2.4 MG/DL — SIGNIFICANT CHANGE UP (ref 1.6–2.6)
MAGNESIUM SERPL-MCNC: 2.4 MG/DL — SIGNIFICANT CHANGE UP (ref 1.6–2.6)
MCHC RBC-ENTMCNC: 32 PG — SIGNIFICANT CHANGE UP (ref 27–34)
MCHC RBC-ENTMCNC: 32 PG — SIGNIFICANT CHANGE UP (ref 27–34)
MCHC RBC-ENTMCNC: 32.7 GM/DL — SIGNIFICANT CHANGE UP (ref 32–36)
MCHC RBC-ENTMCNC: 32.7 GM/DL — SIGNIFICANT CHANGE UP (ref 32–36)
MCV RBC AUTO: 97.9 FL — SIGNIFICANT CHANGE UP (ref 80–100)
MCV RBC AUTO: 97.9 FL — SIGNIFICANT CHANGE UP (ref 80–100)
NRBC # BLD: 0 /100 WBCS — SIGNIFICANT CHANGE UP (ref 0–0)
NRBC # BLD: 0 /100 WBCS — SIGNIFICANT CHANGE UP (ref 0–0)
PHOSPHATE SERPL-MCNC: 3.8 MG/DL — SIGNIFICANT CHANGE UP (ref 2.5–4.5)
PHOSPHATE SERPL-MCNC: 3.8 MG/DL — SIGNIFICANT CHANGE UP (ref 2.5–4.5)
PLATELET # BLD AUTO: 99 K/UL — LOW (ref 150–400)
PLATELET # BLD AUTO: 99 K/UL — LOW (ref 150–400)
POTASSIUM SERPL-MCNC: 4.2 MMOL/L — SIGNIFICANT CHANGE UP (ref 3.5–5.3)
POTASSIUM SERPL-MCNC: 4.2 MMOL/L — SIGNIFICANT CHANGE UP (ref 3.5–5.3)
POTASSIUM SERPL-MCNC: 4.3 MMOL/L — SIGNIFICANT CHANGE UP (ref 3.5–5.3)
POTASSIUM SERPL-MCNC: 4.3 MMOL/L — SIGNIFICANT CHANGE UP (ref 3.5–5.3)
POTASSIUM SERPL-SCNC: 4.2 MMOL/L — SIGNIFICANT CHANGE UP (ref 3.5–5.3)
POTASSIUM SERPL-SCNC: 4.2 MMOL/L — SIGNIFICANT CHANGE UP (ref 3.5–5.3)
POTASSIUM SERPL-SCNC: 4.3 MMOL/L — SIGNIFICANT CHANGE UP (ref 3.5–5.3)
POTASSIUM SERPL-SCNC: 4.3 MMOL/L — SIGNIFICANT CHANGE UP (ref 3.5–5.3)
PROT SERPL-MCNC: 6.2 G/DL — SIGNIFICANT CHANGE UP (ref 6–8.3)
PROT SERPL-MCNC: 6.2 G/DL — SIGNIFICANT CHANGE UP (ref 6–8.3)
PROTHROM AB SERPL-ACNC: 12.1 SEC — SIGNIFICANT CHANGE UP (ref 9.5–13)
PROTHROM AB SERPL-ACNC: 12.1 SEC — SIGNIFICANT CHANGE UP (ref 9.5–13)
RBC # BLD: 2.81 M/UL — LOW (ref 4.2–5.8)
RBC # BLD: 2.81 M/UL — LOW (ref 4.2–5.8)
RBC # FLD: 13.9 % — SIGNIFICANT CHANGE UP (ref 10.3–14.5)
RBC # FLD: 13.9 % — SIGNIFICANT CHANGE UP (ref 10.3–14.5)
SODIUM SERPL-SCNC: 138 MMOL/L — SIGNIFICANT CHANGE UP (ref 135–145)
SODIUM SERPL-SCNC: 138 MMOL/L — SIGNIFICANT CHANGE UP (ref 135–145)
SODIUM SERPL-SCNC: 142 MMOL/L — SIGNIFICANT CHANGE UP (ref 135–145)
SODIUM SERPL-SCNC: 142 MMOL/L — SIGNIFICANT CHANGE UP (ref 135–145)
WBC # BLD: 5.58 K/UL — SIGNIFICANT CHANGE UP (ref 3.8–10.5)
WBC # BLD: 5.58 K/UL — SIGNIFICANT CHANGE UP (ref 3.8–10.5)
WBC # FLD AUTO: 5.58 K/UL — SIGNIFICANT CHANGE UP (ref 3.8–10.5)
WBC # FLD AUTO: 5.58 K/UL — SIGNIFICANT CHANGE UP (ref 3.8–10.5)

## 2023-11-28 PROCEDURE — 99221 1ST HOSP IP/OBS SF/LOW 40: CPT | Mod: GC

## 2023-11-28 PROCEDURE — 99232 SBSQ HOSP IP/OBS MODERATE 35: CPT | Mod: 25

## 2023-11-28 PROCEDURE — 99222 1ST HOSP IP/OBS MODERATE 55: CPT

## 2023-11-28 PROCEDURE — 99291 CRITICAL CARE FIRST HOUR: CPT | Mod: 25

## 2023-11-28 PROCEDURE — 93306 TTE W/DOPPLER COMPLETE: CPT | Mod: 26

## 2023-11-28 PROCEDURE — 36620 INSERTION CATHETER ARTERY: CPT

## 2023-11-28 RX ORDER — SODIUM CHLORIDE 9 MG/ML
1000 INJECTION, SOLUTION INTRAVENOUS
Refills: 0 | Status: DISCONTINUED | OUTPATIENT
Start: 2023-11-28 | End: 2023-11-28

## 2023-11-28 RX ORDER — DEXTROSE 50 % IN WATER 50 %
15 SYRINGE (ML) INTRAVENOUS ONCE
Refills: 0 | Status: DISCONTINUED | OUTPATIENT
Start: 2023-11-28 | End: 2023-12-08

## 2023-11-28 RX ORDER — PANTOPRAZOLE SODIUM 20 MG/1
40 TABLET, DELAYED RELEASE ORAL
Refills: 0 | Status: DISCONTINUED | OUTPATIENT
Start: 2023-11-28 | End: 2023-12-06

## 2023-11-28 RX ORDER — INSULIN LISPRO 100/ML
VIAL (ML) SUBCUTANEOUS
Refills: 0 | Status: DISCONTINUED | OUTPATIENT
Start: 2023-11-28 | End: 2023-12-08

## 2023-11-28 RX ORDER — DEXTROSE 50 % IN WATER 50 %
25 SYRINGE (ML) INTRAVENOUS ONCE
Refills: 0 | Status: DISCONTINUED | OUTPATIENT
Start: 2023-11-28 | End: 2023-12-08

## 2023-11-28 RX ORDER — GLUCAGON INJECTION, SOLUTION 0.5 MG/.1ML
1 INJECTION, SOLUTION SUBCUTANEOUS ONCE
Refills: 0 | Status: DISCONTINUED | OUTPATIENT
Start: 2023-11-28 | End: 2023-12-08

## 2023-11-28 RX ORDER — LABETALOL HCL 100 MG
1 TABLET ORAL
Qty: 400 | Refills: 0 | Status: DISCONTINUED | OUTPATIENT
Start: 2023-11-28 | End: 2023-11-28

## 2023-11-28 RX ORDER — PRIMIDONE 250 MG/1
250 TABLET ORAL AT BEDTIME
Refills: 0 | Status: DISCONTINUED | OUTPATIENT
Start: 2023-11-28 | End: 2023-12-08

## 2023-11-28 RX ORDER — INFLUENZA VIRUS VACCINE 15; 15; 15; 15 UG/.5ML; UG/.5ML; UG/.5ML; UG/.5ML
0.7 SUSPENSION INTRAMUSCULAR ONCE
Refills: 0 | Status: DISCONTINUED | OUTPATIENT
Start: 2023-11-28 | End: 2023-12-08

## 2023-11-28 RX ORDER — DEXTROSE 50 % IN WATER 50 %
12.5 SYRINGE (ML) INTRAVENOUS ONCE
Refills: 0 | Status: DISCONTINUED | OUTPATIENT
Start: 2023-11-28 | End: 2023-12-08

## 2023-11-28 RX ORDER — NICARDIPINE HYDROCHLORIDE 30 MG/1
5 CAPSULE, EXTENDED RELEASE ORAL
Qty: 40 | Refills: 0 | Status: DISCONTINUED | OUTPATIENT
Start: 2023-11-28 | End: 2023-11-28

## 2023-11-28 RX ORDER — INSULIN LISPRO 100/ML
VIAL (ML) SUBCUTANEOUS
Refills: 0 | Status: DISCONTINUED | OUTPATIENT
Start: 2023-11-28 | End: 2023-11-28

## 2023-11-28 RX ORDER — PANTOPRAZOLE SODIUM 20 MG/1
40 TABLET, DELAYED RELEASE ORAL EVERY 12 HOURS
Refills: 0 | Status: DISCONTINUED | OUTPATIENT
Start: 2023-11-28 | End: 2023-11-28

## 2023-11-28 RX ORDER — DEXTROSE 50 % IN WATER 50 %
25 SYRINGE (ML) INTRAVENOUS ONCE
Refills: 0 | Status: DISCONTINUED | OUTPATIENT
Start: 2023-11-28 | End: 2023-11-28

## 2023-11-28 RX ORDER — INSULIN LISPRO 100/ML
VIAL (ML) SUBCUTANEOUS AT BEDTIME
Refills: 0 | Status: DISCONTINUED | OUTPATIENT
Start: 2023-11-28 | End: 2023-12-08

## 2023-11-28 RX ADMIN — Medication 1 DROP(S): at 17:02

## 2023-11-28 RX ADMIN — Medication 1 DROP(S): at 01:17

## 2023-11-28 RX ADMIN — PRIMIDONE 250 MILLIGRAM(S): 250 TABLET ORAL at 01:12

## 2023-11-28 RX ADMIN — SODIUM CHLORIDE 3 MILLILITER(S): 9 INJECTION INTRAMUSCULAR; INTRAVENOUS; SUBCUTANEOUS at 21:12

## 2023-11-28 RX ADMIN — Medication 1: at 22:26

## 2023-11-28 RX ADMIN — SODIUM CHLORIDE 50 MILLILITER(S): 9 INJECTION INTRAMUSCULAR; INTRAVENOUS; SUBCUTANEOUS at 11:27

## 2023-11-28 RX ADMIN — SODIUM CHLORIDE 75 MILLILITER(S): 9 INJECTION INTRAMUSCULAR; INTRAVENOUS; SUBCUTANEOUS at 01:14

## 2023-11-28 RX ADMIN — SODIUM CHLORIDE 3 MILLILITER(S): 9 INJECTION INTRAMUSCULAR; INTRAVENOUS; SUBCUTANEOUS at 13:50

## 2023-11-28 RX ADMIN — Medication 1 DROP(S): at 11:27

## 2023-11-28 RX ADMIN — Medication 1 DROP(S): at 06:00

## 2023-11-28 RX ADMIN — SODIUM CHLORIDE 3 MILLILITER(S): 9 INJECTION INTRAMUSCULAR; INTRAVENOUS; SUBCUTANEOUS at 06:00

## 2023-11-28 RX ADMIN — PRIMIDONE 250 MILLIGRAM(S): 250 TABLET ORAL at 22:21

## 2023-11-28 RX ADMIN — Medication 60 MG/MIN: at 01:12

## 2023-11-28 RX ADMIN — Medication 1 DROP(S): at 23:29

## 2023-11-28 RX ADMIN — Medication 100 MILLIGRAM(S): at 22:22

## 2023-11-28 RX ADMIN — Medication 100 MILLIGRAM(S): at 01:12

## 2023-11-28 NOTE — PHYSICAL THERAPY INITIAL EVALUATION ADULT - ADDITIONAL COMMENTS
PTA pt states he was independent with all mobility & ADLs (confirmed with case management assessment.)

## 2023-11-28 NOTE — CONSULT NOTE ADULT - ASSESSMENT
83 y.o. M PMHx significant for epilepsy, HTN, BPH, hypothyroidism, type a aortic dissection s/p repair in 2019 who presents to the ED s/p fall, admitted to CTU for monitoring s/p labetalol/cardene gtt. Medicine consulted for BP management and tx to medicine service.   Patient is feeling well without any complaints. Currently normotensive without any BP agents. 2d echo normal. Recommend primary team to continue to do dc planning, PT pending. Would not add any BP agents at this time as SBP remains 100-120s, if trends up add back home meds.    - incidental nonspecific rectal wall enhancement on CT- recommend opt GI referral if within patient's GOC.     Medicine to sign off.

## 2023-11-28 NOTE — PHYSICAL THERAPY INITIAL EVALUATION ADULT - PERTINENT HX OF CURRENT PROBLEM, REHAB EVAL
83 y.o. M PMHx significant for epilepsy, HTN, BPH, hypothyroidism, who presents to the ED s/p fall to pavement approximately 15:00 today.  Patient had direct impact to left side of face presents with severe ecchymosis of the left eye which is closed shut at this time.  Son who accompanies patient and translates as patient is non-English speaking, states patient was walking with a umbrella 5 steps in front of him.  Umbrella slipped causing him to fall to the floor.  Patient's son states prior to fall patient was not lightheaded or dizzy. He denies LOC after fall.  Patient did not appear to be confused after fall.  Additionally, c.o. pain to the L. knee which has a superficial knee abrasion.  Denies HA, CP, SOB, ABD pain, or any other complaints at this time. patient was taken to CTU for monitoring then downgraded to CTS service for further care. Patient remained normotensive and no events on telemetry. CTS consulted for BP management and transfer to medicine service for continuation of PT services and discharge planning. Pt now with left periorbital hematoma and frontal scalp hematoma  - left eye posterior vitreous detachment.   Noncontrast CT Head: No acute intracranial hemorrhage or calvarial   fracture. CT maxillofacial: No acute maxillofacial bone fracture. Left periorbital soft tissue swelling at left frontal scalp hematoma. Foci of hyperdensity within the left frontal scalp hematoma, most consistent with active bleeding.CT cervical spine: No acute cervical spine fracture or evidence of traumatic malalignment. CTA Neck: Noncalcified atherosclerotic plaque at the proximal left internal carotid artery with short segment flap-like opacity, which may represent sequelae of an age-indeterminate dissection. No significant flow-limiting stenosis within the cervical carotid or vertebral arteries. Partially imaged thoracic aortic dissection. Correlate with CTA chest performed concurrently. CTA Head: No proximal large vessel occlusion orsignificant stenosis.CTA C/A/P - s/p ascending aortic repair, aortic dissection distal to repair extending to abdominal aorta, celiac artery supplied by false lumen, right CFA pseudoaneurysm

## 2023-11-28 NOTE — PROGRESS NOTE ADULT - ASSESSMENT
83M with PMHx significant for epilepsy, HTN, BPH, hypothyroidism, who presents to the ED s/p fall to pavement approximately 15:00, found to have left periorbital edema/ecchymoses.     # Left eye trauma  # Posterior vitreous detachment, both eyes   - S/p mechanical fall found to have periorbital ecchymoses/edema  - VA stable, IOP wnl OU, no rAPD OU   - EOMs full OU, CVF full OU, color plates full each eye   - Anterior exam (left eye) with AC deep and formed, kristi negative  - DFE with PVD OU, no holes, tears or detachments  - Anterior exam and DFE (right eye) wnl   - No acute ophthalmalic intervention   - Recommend ice to left eye for the first 48hr after trauma (20 min out of each hour), HOB elevation if tolerated and approved by primary team (not necessary) to promote relief of edema  - Continue artificial tears QID (ordered)  - Pt will require close outpatient follow up with repeat DFE after discharge  - Ophthalmology signing off, please re-consult as needed.    Outpatient Follow-up: Patient should follow-up with his/her ophthalmologist or with [facility] Department of Ophthalmology within 1 week of after discharge at:    [address]  [place] [address]  [phone] 83M with PMHx significant for epilepsy, HTN, BPH, hypothyroidism, who presents to the ED s/p fall to pavement approximately 15:00, found to have left periorbital edema/ecchymoses.     # Left eye trauma  # Posterior vitreous detachment, both eyes   - S/p mechanical fall found to have periorbital ecchymoses/edema  - VA stable, IOP wnl OU, no rAPD OU   - EOMs full OU, CVF full OU, color plates full each eye   - Anterior exam (left eye) with AC deep and formed, kristi negative  - DFE with PVD OU, no holes, tears or detachments  - Anterior exam and DFE (right eye) wnl   - No acute ophthalmalic intervention   - Recommend ice to left eye for the first 48hr after trauma (20 min out of each hour), HOB elevation if tolerated and approved by primary team (not necessary) to promote relief of edema  - Continue artificial tears QID (ordered)  - Pt will require close outpatient follow up with repeat DFE after discharge  - Ophthalmology signing off, please re-consult as needed.    Outpatient Follow-up: Patient should follow-up with his/her ophthalmologist or with [facility] Department of Ophthalmology within 1 week of after discharge at:    [address]  [place] [address]  [phone] 83M with PMHx significant for epilepsy, HTN, BPH, hypothyroidism, who presents to the ED s/p fall to pavement approximately 15:00, found to have left periorbital edema/ecchymoses.     # Left eye trauma  # Posterior vitreous detachment, both eyes   - S/p mechanical fall found to have periorbital ecchymoses/edema  - VA stable, IOP wnl OU, no rAPD OU   - EOMs full OU, CVF full OU, color plates full each eye   - Anterior exam (left eye) with AC deep and formed, kristi negative  - DFE with PVD OU, no holes, tears or detachments  - Anterior exam and DFE (right eye) wnl   - No acute ophthalmalic intervention   - Recommend ice to left eye for the first 48hr after trauma (20 min out of each hour), HOB elevation if tolerated and approved by primary team (not necessary) to promote relief of edema  - Continue artificial tears QID (ordered)  - Pt will require close outpatient follow up with repeat DFE after discharge  - Ophthalmology signing off, please re-consult as needed.    Outpatient Follow-up: Patient should follow-up with his/her ophthalmologist or with [facility] Department of Ophthalmology within 1 week of after discharge at:    [address]  [place] [address]  [phone]

## 2023-11-28 NOTE — PROGRESS NOTE ADULT - PROBLEM SELECTOR PLAN 2
Transfer to medicine service   telemetry  HX of  seizure on phenytoin   Suspension 100 milliGRAM(s) Oral at bedtime  primidone 250 milliGRAM(s) Oral at bedtime  BP stable at this time resume meds as appropriate  -proponolol - losartan -lasix  PT consult  NO acute CTS surgery intervention at this time Transfer to medicine service   telemetry  HX of  seizure on phenytoin   Suspension 100 milliGRAM(s) Oral at bedtime  primidone 250 milliGRAM(s) Oral at bedtime  BP stable at this time resume meds as appropriate  -proponolol - losartan -lasix  Followup in six months with Dr Durán CT scan      PT consult recommends home  NO acute CTS surgery intervention at this time

## 2023-11-28 NOTE — CONSULT NOTE ADULT - SUBJECTIVE AND OBJECTIVE BOX
HPI:  Chantal Guadalupe is an 83 y.o. M PMHx significant for epilepsy, HTN, BPH, hypothyroidism, who presents to the ED s/p fall to pavement approximately 15:00 today.  Patient had direct impact to left side of face presents with severe ecchymosis of the left eye which is closed shut at this time.  Son who accompanies patient and translates as patient is non-English speaking, states patient was walking with a umbrella 5 steps in front of him.  Umbrella slipped causing him to fall to the floor.  Patient's son states prior to fall patient was not lightheaded or dizzy. He denies LOC after fall.  Patient did not appear to be confused after fall.  Additionally, c.o. pain to the L. knee which has a superficial knee abrasion.  Denies HA, CP, SOB, ABD pain, or any other complaints at this time. Pt does not wear corrective lens. (27 Nov 2023 23:35)    patient was taken to CTU for monitoring then downgraded to CTS service for further care. Patient remained normotensive and no events on telemetry. CTS consulted for BP management and transfer to medicine service for continuation of PT services and discharge planning.     [facility] # [id number]    Patient feels well. eyes are improving. Patient just had eye drops placed by opthalmology denies any chest pain, eye pain, sob, palpitations, or headaches    tele NSR      PAST MEDICAL & SURGICAL HISTORY:  Hypertension      Seizure      S/P aortic dissection repair          Review of Systems:   CONSTITUTIONAL: No fever, weight loss, or fatigue  EYES: No eye pain, visual disturbances, or discharge  ENMT:  No difficulty hearing, tinnitus, vertigo; No sinus or throat pain  NECK: No pain or stiffness  BREASTS: No pain, masses, or nipple discharge  RESPIRATORY: No cough, wheezing, chills or hemoptysis; No shortness of breath  CARDIOVASCULAR: No chest pain, palpitations, dizziness, or leg swelling  GASTROINTESTINAL: No abdominal or epigastric pain. No nausea, vomiting, or hematemesis; No diarrhea or constipation. No melena or hematochezia.  GENITOURINARY: No dysuria, frequency, hematuria, or incontinence  NEUROLOGICAL: No headaches, memory loss, loss of strength, numbness, or tremors  SKIN: No itching, burning, rashes, or lesions   LYMPH NODES: No enlarged glands  ENDOCRINE: No heat or cold intolerance; No hair loss  MUSCULOSKELETAL: No joint pain or swelling; No muscle, back, or extremity pain  PSYCHIATRIC: No depression, anxiety, mood swings, or difficulty sleeping  HEME/LYMPH: No easy bruising, or bleeding gums  ALLERY AND IMMUNOLOGIC: No hives or eczema    Allergies    No Known Allergies    Intolerances        Social History:     FAMILY HISTORY:      MEDICATIONS  (STANDING):  artificial tears (preservative free) Ophthalmic Solution 1 Drop(s) Both EYES four times a day  dextrose 50% Injectable 25 Gram(s) IV Push once  dextrose 50% Injectable 12.5 Gram(s) IV Push once  glucagon  Injectable 1 milliGRAM(s) IntraMuscular once  influenza  Vaccine (HIGH DOSE) 0.7 milliLiter(s) IntraMuscular once  insulin lispro (ADMELOG) corrective regimen sliding scale   SubCutaneous three times a day before meals  insulin lispro (ADMELOG) corrective regimen sliding scale   SubCutaneous at bedtime  pantoprazole    Tablet 40 milliGRAM(s) Oral before breakfast  phenytoin   Suspension 100 milliGRAM(s) Oral at bedtime  primidone 250 milliGRAM(s) Oral at bedtime  sodium chloride 0.9% lock flush 3 milliLiter(s) IV Push every 8 hours  sodium chloride 0.9%. 1000 milliLiter(s) (50 mL/Hr) IV Continuous <Continuous>    MEDICATIONS  (PRN):  dextrose Oral Gel 15 Gram(s) Oral once PRN Blood Glucose LESS THAN 70 milliGRAM(s)/deciliter        CAPILLARY BLOOD GLUCOSE      POCT Blood Glucose.: 128 mg/dL (28 Nov 2023 10:04)    I&O's Summary    27 Nov 2023 07:01  -  28 Nov 2023 07:00  --------------------------------------------------------  IN: 1742.5 mL / OUT: 325 mL / NET: 1417.5 mL    28 Nov 2023 07:01  -  28 Nov 2023 14:38  --------------------------------------------------------  IN: 565 mL / OUT: 350 mL / NET: 215 mL        PHYSICAL EXAM:  GENERAL: NAD, well-developed  HEENT: Left eye ecchymoses, slight TTP; slightly closed, EOMI, PERRL, conjunctiva and sclera clearSupple, No JVD  CHEST/LUNG: Clear to auscultation bilaterally; No wheeze  HEART: Regular rate and rhythm; No murmurs, rubs, or gallops  ABDOMEN: Soft, Nontender, Nondistended; Bowel sounds present  EXTREMITIES:  2+ Peripheral Pulses, No clubbing, cyanosis, or edema  PSYCH: AAOx3  NEUROLOGY: non-focal  SKIN: No rashes or lesions    LABS:                        11.1   7.99  )-----------( 134      ( 27 Nov 2023 22:08 )             34.0     11-28    142  |  111<H>  |  60<H>  ----------------------------<  162<H>  4.3   |  17<L>  |  1.75<H>    Ca    8.1<L>      28 Nov 2023 11:09  Phos  3.8     11-28  Mg     2.4     11-28    TPro  6.2  /  Alb  3.7  /  TBili  0.2  /  DBili  x   /  AST  17  /  ALT  15  /  AlkPhos  70  11-28    PT/INR - ( 28 Nov 2023 03:24 )   PT: 12.1 sec;   INR: 1.10 ratio         PTT - ( 28 Nov 2023 03:24 )  PTT:31.6 sec      Urinalysis Basic - ( 28 Nov 2023 11:09 )    Color: x / Appearance: x / SG: x / pH: x  Gluc: 162 mg/dL / Ketone: x  / Bili: x / Urobili: x   Blood: x / Protein: x / Nitrite: x   Leuk Esterase: x / RBC: x / WBC x   Sq Epi: x / Non Sq Epi: x / Bacteria: x        RADIOLOGY & ADDITIONAL TESTS:    Imaging Personally Reviewed:    Consultant(s) Notes Reviewed:      Care Discussed with Consultants/Other Providers:

## 2023-11-28 NOTE — CONSULT NOTE ADULT - ATTENDING COMMENTS
ATTENDING ATTESTATION:    83M history of HTN, BPH, bladder urothelial cancer s/p TURBT and radiation, ascending aortic dissection s/p repair (2013) with known descending aortic dissection s/p mechanical fall. Admitted to CTU for hemodynamic monitoring.    Vitals and labs reviewed    CTH - no acute intracranial injury  CT max/face - no acute fracture, left periorbital soft tissue swelling and frontal scalp hematoma  CTA neck - no c-spine injury, left proximal ICA atherosclerotic plaque possible age-indeterminate dissection  CTA C/A/P - s/p ascending aortic repair, aortic dissection distal to repair extending to abdominal aorta, celiac artery supplied by false lumen, right CFA pseudoaneurysm    A/P: Mechanical fall with following injuries:  - left periorbital hematoma and frontal scalp hematoma  - left eye posterior vitreous detachment    #mechanical fall   - will perform tertiary in AM  - PT evaluation    #left periorbital hematoma and posterior vitreous detachment  - seen by optho, appreciate recs  - follow up outpatient    #incidental findings  - left proximal ICA possible indeterminate dissection, outpatient follow up for carotid doppler   - right CFA pseudoaneurysm, outpatient vascular follow up       Total time spent in the care of this patient today (excluding critical care, teaching & procedures): 50 minutes    Over 50% of the total time was spent in discussion and coordination of care with consulting services, dietary and rehab services.    Francheska Babb MD  Acute Care Surgery ATTENDING ATTESTATION:    83M history of HTN, BPH, bladder urothelial cancer s/p TURBT and radiation, ascending aortic dissection s/p repair (2013) with known descending aortic dissection s/p mechanical fall. Admitted to CTU for hemodynamic monitoring.    Vitals and labs reviewed    CTH - no acute intracranial injury  CT max/face - no acute fracture, left periorbital soft tissue swelling and frontal scalp hematoma  CTA neck - no c-spine injury, left proximal ICA atherosclerotic plaque possible age-indeterminate dissection  CTA C/A/P - s/p ascending aortic repair, aortic dissection distal to repair extending to abdominal aorta, celiac artery supplied by false lumen, right CFA pseudoaneurysm    A/P: Mechanical fall with following injuries:  - left periorbital hematoma and frontal scalp hematoma  - left eye posterior vitreous detachment    #mechanical fall   - will perform tertiary in AM  - PT evaluation    #left periorbital hematoma and posterior vitreous detachment  - seen by optho, appreciate recs  - follow up outpatient    #incidental findings  - left proximal ICA possible indeterminate dissection, outpatient follow up for carotid doppler   - right CFA pseudoaneurysm, outpatient vascular follow up       Total time spent in the care of this patient today (excluding critical care, teaching & procedures): 40 minutes    Over 50% of the total time was spent in discussion and coordination of care with consulting services, dietary and rehab services.    Francheska Babb MD  Acute Care Surgery

## 2023-11-28 NOTE — CONSULT NOTE ADULT - SUBJECTIVE AND OBJECTIVE BOX
seen examined. d/w ctu team  suspect CKD. full consult to follow  seen examined. d/w ctu team  suspect CKD. full consult to follow     [facility]  [phone]  NEPHROLOGY      INITIAL CONSULT NOTE  --------------------------------------------------------------------------------  HPI:      Chantal Guadalupe is an 83 y.o. M PMHx significant for epilepsy, HTN, BPH, hypothyroidism, who presents to the ED s/p fall to pavement .  Patient had direct impact to left side of face presents with severe ecchymosis of the left eye which is closed shut at this time.  Son who accompanies patient and translates as patient is non-English speaking, states patient was walking with a umbrella 5 steps in front of him.  Umbrella slipped causing him to fall to the floor.  Patient's son states prior to fall patient was not lightheaded or dizzy. He denies LOC after fall.  Patient did not appear to be confused after fall.  Additionally, c.o. pain to the L. knee which has a superficial knee abrasion. noticed with abn creatinine. renal consult called.   PAST HISTORY  --------------------------------------------------------------------------------  PAST MEDICAL & SURGICAL HISTORY:  Hypertension      Seizure      S/P aortic dissection repair        FAMILY HISTORY:    PAST SOCIAL HISTORY:    ALLERGIES & MEDICATIONS  --------------------------------------------------------------------------------  Allergies    No Known Allergies    Intolerances      Standing Inpatient Medications  artificial tears (preservative free) Ophthalmic Solution 1 Drop(s) Both EYES four times a day  dextrose 50% Injectable 25 Gram(s) IV Push once  dextrose 50% Injectable 12.5 Gram(s) IV Push once  glucagon  Injectable 1 milliGRAM(s) IntraMuscular once  influenza  Vaccine (HIGH DOSE) 0.7 milliLiter(s) IntraMuscular once  insulin lispro (ADMELOG) corrective regimen sliding scale   SubCutaneous three times a day before meals  insulin lispro (ADMELOG) corrective regimen sliding scale   SubCutaneous at bedtime  pantoprazole    Tablet 40 milliGRAM(s) Oral before breakfast  phenytoin   Suspension 100 milliGRAM(s) Oral at bedtime  primidone 250 milliGRAM(s) Oral at bedtime  sodium chloride 0.9% lock flush 3 milliLiter(s) IV Push every 8 hours  sodium chloride 0.9%. 1000 milliLiter(s) IV Continuous <Continuous>    PRN Inpatient Medications  dextrose Oral Gel 15 Gram(s) Oral once PRN  dextrose Oral Gel 15 Gram(s) Oral once PRN      REVIEW OF SYSTEMS via  earlier today   --------------------------------------------------------------------------------  Gen: No  fevers/chills   Skin: No rashes  Head/Eyes/Ears/Mouth: No headache; Normal hearing;  No sinus pain/discomfort, sore throat  Respiratory: No dyspnea, cough, wheezing, hemoptysis  CV: No chest pain, orthopnea  GI: No abdominal pain, diarrhea, nausea, vomiting, melena,  : No dysuria, decrease urination or hesitancy urinating  hematuria, nocturia  MSK:  +  joint pain/swelling; no back pain  Neuro: No dizziness/lightheadedness,  also with no edema         VITALS/PHYSICAL EXAM  --------------------------------------------------------------------------------  T(C): 36.6 (11-28-23 @ 19:15), Max: 36.6 (11-28-23 @ 19:15)  HR: 91 (11-28-23 @ 19:15) (74 - 101)  BP: 119/62 (11-28-23 @ 19:15) (73/40 - 164/81)  RR: 18 (11-28-23 @ 19:15) (15 - 43)  SpO2: 99% (11-28-23 @ 19:15) (96% - 100%)  Wt(kg): --  Height (cm): 162.6 (11-27-23 @ 16:15)  Weight (kg): 62.9 (11-27-23 @ 22:35)  BMI (kg/m2): 23.8 (11-27-23 @ 22:35)  BSA (m2): 1.67 (11-27-23 @ 22:35)      11-27-23 @ 07:01  -  11-28-23 @ 07:00  --------------------------------------------------------  IN: 1742.5 mL / OUT: 325 mL / NET: 1417.5 mL    11-28-23 @ 07:01  -  11-28-23 @ 22:43  --------------------------------------------------------  IN: 565 mL / OUT: 350 mL / NET: 215 mL      Physical Exam:  	Gen: Non toxic comfortable appearing  facial ecchymosis and periorbital as well   	no jvd   	Pulm: decrease bs  no rales or ronchi or wheezing  	CV: RRR, S1S2; no rub  	Back: No CVA tenderness;  	Abd: +BS, soft, nontender/nondistended  	: No suprapubic tenderness  	UE: Warm, no cyanosis  no clubbing,  no edema  	LE: Warm, no cyanosis  no clubbing, no edema  	Neuro: alert and oriented. speech coherent   	Skin: areas of abrasion         LABS/STUDIES  --------------------------------------------------------------------------------              9.0    5.58  >-----------<  99       [11-28-23 @ 14:28]              27.5     142  |  111  |  60  ----------------------------<  162      [11-28-23 @ 11:09]  4.3   |  17  |  1.75        Ca     8.1     [11-28-23 @ 11:09]      Mg     2.4     [11-28-23 @ 11:09]      Phos  3.8     [11-28-23 @ 11:09]    TPro  6.2  /  Alb  3.7  /  TBili  0.2  /  DBili  x   /  AST  17  /  ALT  15  /  AlkPhos  70  [11-28-23 @ 03:04]    PT/INR: PT 12.1 , INR 1.10       [11-28-23 @ 03:24]  PTT: 31.6       [11-28-23 @ 03:24]      Creatinine Trend:  SCr 1.75 [11-28 @ 11:09]  SCr 1.79 [11-28 @ 03:04]  SCr 1.98 [11-27 @ 22:08]      Urinalysis + Microscopic Examination (11.09.21 @ 11:53)   pH Urine: 6.5  Urine Appearance: Turbid  Color: Red  Specific Gravity: 1.007  Protein, Urine: 100 mg/dL  Glucose Qualitative, Urine: Negative  Ketone - Urine: Negative  Blood, Urine: Large  Bilirubin: Negative  Urobilinogen: Negative  Leukocyte Esterase Concentration: Negative  Nitrite: Negative  White Blood Cell - Urine: 0 /HPF  Red Blood Cell - Urine: >50 /hpf  Bacteria: Negative  Hyaline Casts: 0 /LPF  Epithelial Cells: 0      < from: CT Angio Abdomen and Pelvis w/ IV Cont (11.27.23 @ 22:28) >    ACC: [id number] EXAM:  CT ANGIO CHEST AORTA WAWI   ORDERED BY:  SHALONDA DIEHL     ACC: [id number] EXAM:  CT ANGIO ABD PELV (W)AW IC   ORDERED BY:  SHALONDA DIEHL     PROCEDURE DATE:  11/27/2023          INTERPRETATION:  CLINICAL INFORMATION: Aortic dissection. Patient with   known history of aortic dissection, partially imaged on same-day CT neck.   Diagnostic study for complete evaluation.    COMPARISON: CT chest 10/11/2013 and CT chest abdomen pelvis 11/9/2021    CONTRAST/COMPLICATIONS:  IV Contrast: IV contrast documented in unlinked concurrent exam   (accession [id number]), Omnipaque 350 (accession [id number])  125 cc   administered   25 cc discarded  Oral Contrast: NONE  Complications: None reported at time of study completion    PROCEDURE:  CT Angiography of the Chest, Abdomen and Pelvis.  Gated precontrast imaging was performed through the heart followed by   gated CT Angiography of the heart with subsequent non-gated arterial   phase imaging of the chest, abdomen and pelvis.  [place] coronal reformats were performed as well as 3D (MIP)   reconstructions.    FINDINGS:  CT ANGIOGRAM AORTA: Status post ascending thoracic aortic repair. Aortic   dissection is identified within the aortic arch and extending along the   descending thoracic and proximal abdominal aorta to just above the level   of the origin of the superior mesenteric artery. False lumen demonstrates   peripheral thrombus as well as mixing. There is a type A aortic   dissection status post repair with persistentpartial filling of the   false lumen, which demonstrates areas of thrombosis. True lumen lumen   supplies the major arch branches, superior mesenteric, bilateral renal,   and inferior mesenteric arteries. The celiac artery is supplied by the   false lumen and demonstrates aneurysmal dilatation up to 1.3 cm (11:307).   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  Transmural diameters including false and true lumens as follows:  3.6 cm at the aorticroot.  3.2 cm in the mid ascending aorta.  5.7 cm at the distal arch (10:51)  5.3 cm in the mid descending (10:44).  3.7 cm at the level of the diaphragmatic hiatus.    CHEST:  LUNGS AND LARGE AIRWAYS: Patent central airways. No pulmonary nodules.  PLEURA: No pleural effusion.  VESSELS: Normal caliber main pulmonary artery. No central PE. For details   regarding aortic angiogram, please see above.  HEART: Cardiomegaly. No pericardial effusion.  MEDIASTINUM AND CHAPIN: No lymphadenopathy.  CHEST WALL AND LOWER NECK: Sternotomy wires. Hypodense right thyroid lobe   nodules.    ABDOMEN AND PELVIS:  LIVER: Within normal limits.  BILE DUCTS: Normal caliber.  GALLBLADDER: Within normal limits.  SPLEEN: Within normal limits.  PANCREAS: Within normal limits.  ADRENALS: Left adrenal gland nodular thickening, similar to prior.  KIDNEYS/URETERS: No hydronephrosis. Left parapelvic cysts. Right renal   cyst. Additional bilateral low-attenuation lesions too small to   accurately characterize.    BLADDER:Within normal limits.  REPRODUCTIVE ORGANS: Prostate within normal limits.    BOWEL: No bowel obstruction. Nonspecific area of enhancement along the   right rectal wall. Appendix is normal.  PERITONEUM: No ascites.  VESSELS: See above for details regarding aortic angiogram. 1.0 cm   pseudoaneurysm of the right common femoral artery with adjacent surgical   clips.  RETROPERITONEUM/LYMPH NODES: Degenerative changes.  ABDOMINAL WALL: Within normal limits.  BONES: Degenerative changes.    IMPRESSION:    Status post ascending thoracic aortic repair. Aortic dissection distal to   the repair involving the aortic arch, descending thoracic, and proximal   abdominal aorta. Thoracic aorta measures up to 5.7 cm at the level of the   distal arch. Celiac artery supplied by the false lumen and is mildly   aneurysmal proximally.    Right common femoral artery pseudoaneurysm.    Nonspecific area of enhancement along the right rectal wall, for which   nonemergent follow-up direct visualization may be obtained for further   characterization.      --- End of Report ---      < end of copied text >

## 2023-11-28 NOTE — PROGRESS NOTE ADULT - ASSESSMENT
This is 83M history of HTN, BPH, bladder urothelial cancer s/p TURBT and radiation, ascending aortic dissection s/p repair (2013) with known descending aortic dissection s/p mechanical fall. Admitted to CTU for hemodynamic monitoring.  All imaging competed no acute surgical intervention at this time   11/28 VSS  transfer to  seen by opthalmology f/u outpatient seen by Gen surgery no surgical intervention - BP management  PT consult for disposition. Resume home medications as appropriate         This is 83M history of HTN, BPH, bladder urothelial cancer s/p TURBT and radiation, ascending aortic dissection s/p repair (2013) with known descending aortic dissection s/p mechanical fall. Admitted to CTU for hemodynamic monitoring.  All imaging competed no acute surgical intervention at this time   11/28 VSS  transfer to  seen by opthalmology f/u outpatient seen by Gen surgery no surgical intervention - BP management  PT consult for disposition. Resume home medications as appropriate Followup in six months with Dr Durán CT scan

## 2023-11-28 NOTE — PHYSICAL THERAPY INITIAL EVALUATION ADULT - NSPTDMEREC_GEN_A_CORE
Patient will require rolling walker at home due to their diagnosis of      to help complete MRADL's (mobility related activity for daily living)./rolling walker

## 2023-11-28 NOTE — PATIENT PROFILE ADULT - FALL HARM RISK - HARM RISK INTERVENTIONS

## 2023-11-28 NOTE — PROGRESS NOTE ADULT - PROBLEM SELECTOR PLAN 1
# Left eye trauma  # Posterior vitreous detachment, left eye   - S/p mechanical fall found to have periorbital ecchymoses/edema  - VA (cc with +2.50 readers) 20/25 OD 20/30 OS ; IOP 14 OD 16 OS ; no rAPD OU   - EOMs full OU, CVF full OU, color plates full OU   - Anterior exam (left eye) with AC deep and formed, kristi negative  - DFE (left eye) with PVD, no holes, tears or detachments  - Anterior exam and DFE (right eye) wnl   - No acute ophthalmalic intervention   - Recommend ice to left eye for the first 48hr after trauma (20 min out of each hour), HOB elevation if tolerated and approved by primary team (not necessary) to promote relief of edema  - Start artificial tears QID (ordered)  - Pt will require close outpatient follow up once ready for discharge given likely traumatic PVD OS to monitor for development of retinal pathology due to sequelae of trauma    Outpatient Follow-up: Patient should follow-up with his/her ophthalmologist or with [facility] Department of Ophthalmology within 1 week of after discharge at:    [address]  [place] [address]  [phone]

## 2023-11-28 NOTE — PROGRESS NOTE ADULT - SUBJECTIVE AND OBJECTIVE BOX
Patient seen and examined at the bedside.    Remained critically ill on continuous ICU monitoring.    OBJECTIVE:  Vital Signs Last 24 Hrs  T(C): 36.2 (28 Nov 2023 04:00), Max: 36.4 (27 Nov 2023 16:15)  T(F): 97.2 (28 Nov 2023 04:00), Max: 97.6 (27 Nov 2023 16:15)  HR: 85 (28 Nov 2023 06:30) (74 - 113)  BP: 106/53 (28 Nov 2023 06:00) (73/40 - 179/89)  BP(mean): 76 (28 Nov 2023 06:00) (52 - 84)  RR: 23 (28 Nov 2023 06:30) (16 - 43)  SpO2: 98% (28 Nov 2023 06:30) (97% - 99%)    Parameters below as of 28 Nov 2023 04:00  Patient On (Oxygen Delivery Method): room air          Physical Exam:   General: NAD   Neurology: nonfocal   Eyes: bilateral pupils equal and reactive   ENT/Neck: Neck supple, trachea midline, No JVD   Respiratory: Clear bilaterally   CV:Sinus rhythm,  Abdominal: Soft, NT, ND +BS   Extremities: 1-2+ pedal edema noted, + peripheral pulses   Skin: No Rashes, Hematoma, Ecchymosis                           Assessment:  83 y.o. M PMHx significant for epilepsy, HTN, BPH, hypothyroidism, who presents to the ED s/p fall to pavement with left facial trauma 11/27.2023    Hx of Type A dissection repair 11/12/2021  Hemodynamic instability  Hypovolemia  Acute blood loss anemia  Thrombocytopenia  Stress hyperglycemia          Plan:   ***Neuro***  [x] Nonfocal  Phenytek and Mysoline for facial trauma      ***Cardiovascular***  Invasive hemodynamic monitoring, assess perfusion indices   SR / CVP 62/ Hct 34.0%/ Lactate 1.3  Reassessment of hemodynamics post resuscitation   Serial EKG and cardiac enzymes     ***Pulmonary***  [x] RA SPO2>95%  Encourage incentive spirometry, continue pulse ox monitoring, follow ABGs                 ***GI***  [x] NPO after midnight for procedure     ***Renal***  Continue to monitor I/Os, BUN/Creatinine.   Replete lytes PRN  Viera present        ***ID***  No active antibiotic coverage      ***Endocrine***  HbA1c 5.4%     - Monitor blood sugars      Patient requires continuous monitoring with bedside rhythm monitoring, pulse oximetry monitoring, and continuous central venous and arterial pressure monitoring; and intermittent blood gas analysis. Care plan discussed with the ICU care team.   Patient remained critical, at risk for life threatening decompensation.    I have spent 30 minutes providing critical care management to this patient.    By signing my name below, I, Dami Mcmanus, attest that this documentation has been prepared under the direction and in the presence of KARISSA Ortiz   Electronically signed: Eric Lewis, 11-28-23 @ 06:55    I, KARISSA Ortiz, personally performed the services described in this documentation. all medical record entries made by the jumaibdwain were at my direction and in my presence. I have reviewed the chart and agree that the record reflects my personal performance and is accurate and complete  Electronically signed: KARISSA Ortiz  Patient seen and examined at the bedside.    Remained critically ill on continuous ICU monitoring.    OBJECTIVE:  Vital Signs Last 24 Hrs  T(C): 36.2 (28 Nov 2023 04:00), Max: 36.4 (27 Nov 2023 16:15)  T(F): 97.2 (28 Nov 2023 04:00), Max: 97.6 (27 Nov 2023 16:15)  HR: 85 (28 Nov 2023 06:30) (74 - 113)  BP: 106/53 (28 Nov 2023 06:00) (73/40 - 179/89)  BP(mean): 76 (28 Nov 2023 06:00) (52 - 84)  RR: 23 (28 Nov 2023 06:30) (16 - 43)  SpO2: 98% (28 Nov 2023 06:30) (97% - 99%)    Parameters below as of 28 Nov 2023 04:00  Patient On (Oxygen Delivery Method): room air          Physical Exam:   General: NAD   Neurology: nonfocal   Eyes: bilateral pupils equal and reactive   ENT/Neck: Neck supple, trachea midline, No JVD   Respiratory: Clear bilaterally   CV:Sinus rhythm,  Abdominal: Soft, NT, ND +BS   Extremities: 1-2+ pedal edema noted, + peripheral pulses   Skin: No Rashes, Hematoma, Ecchymosis                           Assessment:  83 y.o. M PMHx significant for epilepsy, HTN, BPH, hypothyroidism, who presents to the ED s/p fall to pavement with left facial trauma 11/27.2023    Hx of Type A dissection repair 11/12/2021  Hemodynamic instability  Hypovolemia  Acute blood loss anemia  Thrombocytopenia  Stress hyperglycemia          Plan:   ***Neuro***  [x] Nonfocal  Phenytek and Mysoline for facial trauma  PT consult       ***Cardiovascular***  Invasive hemodynamic monitoring, assess perfusion indices   SR / CVP 62/ Hct 34.0%/ Lactate 1.3  Reassessment of hemodynamics post resuscitation   Serial EKG and cardiac enzymes     ***Pulmonary***  [x] RA SPO2>95%  Encourage incentive spirometry, continue pulse ox monitoring, follow ABGs                 ***GI***  [x] NPO for now, Advance diet       ***Renal***  Continue to monitor I/Os, BUN/Creatinine.   Replete lytes PRN  Viera present      ***ID***  No active antibiotic coverage      ***Endocrine***  HbA1c 5.4%     - Initiate sliding scale       Patient requires continuous monitoring with bedside rhythm monitoring, pulse oximetry monitoring, and continuous central venous and arterial pressure monitoring; and intermittent blood gas analysis. Care plan discussed with the ICU care team.   Patient remained critical, at risk for life threatening decompensation.    I have spent 30 minutes providing critical care management to this patient.    By signing my name below, I, Dami Mcmanus, attest that this documentation has been prepared under the direction and in the presence of KARISSA Ortiz   Electronically signed: Eric Lewis, 11-28-23 @ 06:55    I, KARISSA Ortiz, personally performed the services described in this documentation. all medical record entries made by the scribe were at my direction and in my presence. I have reviewed the chart and agree that the record reflects my personal performance and is accurate and complete  Electronically signed: KARISSA Ortiz  Patient seen and examined at the bedside.    Remained critically ill on continuous ICU monitoring.    OBJECTIVE:  Vital Signs Last 24 Hrs  T(C): 36.2 (28 Nov 2023 04:00), Max: 36.4 (27 Nov 2023 16:15)  T(F): 97.2 (28 Nov 2023 04:00), Max: 97.6 (27 Nov 2023 16:15)  HR: 85 (28 Nov 2023 06:30) (74 - 113)  BP: 106/53 (28 Nov 2023 06:00) (73/40 - 179/89)  BP(mean): 76 (28 Nov 2023 06:00) (52 - 84)  RR: 23 (28 Nov 2023 06:30) (16 - 43)  SpO2: 98% (28 Nov 2023 06:30) (97% - 99%)    Parameters below as of 28 Nov 2023 04:00  Patient On (Oxygen Delivery Method): room air          Physical Exam:   General: NAD   Neurology: nonfocal   Eyes: swelling with ecchymosis to the L periorbital soft tissue and ecchymosis to the R periorbital soft tissue, L pupil dilated and non-reactive to light 2/2 dilation from recent optho exam, EOMI   ENT/Neck: Neck supple, trachea midline, No JVD   Respiratory: Clear bilaterally   CV: Sinus rhythm,  Abdominal: Soft, NT, ND +BS   Extremities: + peripheral pulses   Skin: No Rashes    Assessment:  83 y.o. M PMHx significant for epilepsy, HTN, BPH, hypothyroidism, who presents to the ED s/p fall to pavement with left facial trauma 11/27.2023    Hx of Type A dissection repair 11/12/2021  Hemodynamic instability  Hypovolemia  Acute blood loss anemia  Thrombocytopenia  Stress hyperglycemia          Plan:   ***Neuro***  [x] Nonfocal  Phenytek and primidone for hx of seizures  PT consult   Trauma consult for fall, no acute management aside from Optho management, but no acute intervention at this time per Optho and will need out patient f/u      ***Cardiovascular***  Invasive hemodynamic monitoring, assess perfusion indices   SR / CVP 62/ Hct 34.0%/ Lactate 1.3  admitted to CTU for concern of dissection per Dr. Contreras, no acute intervention, chronic in nature no acute changes  resume home anti-HTN medication as BP tolerates  Cardene and Labetolol weaned over night    ***Pulmonary***  [x] RA SPO2>95%  Encourage incentive spirometry, continue pulse ox monitoring, follow ABGs              ***GI***  Started as NPO, passed bedside swallow, ok for PO  Protonix for GI protection      ***Renal***  Continue to monitor I/Os, BUN/Creatinine elevated initially but downtrending, fluids with improvement  Nephro (Dr. Morin) consulted, following  Replete lytes PRN    ***ID***  No active antibiotic coverage      ***Endocrine***  HbA1c 5.4%     - Initiate sliding scale       Patient requires continuous monitoring with bedside rhythm monitoring, pulse oximetry monitoring, and continuous central venous and arterial pressure monitoring; and intermittent blood gas analysis. Care plan discussed with the ICU care team.   Patient remained critical, at risk for life threatening decompensation.    I have spent 30 minutes providing critical care management to this patient.    By signing my name below, I, Dami Mcmanus, attest that this documentation has been prepared under the direction and in the presence of KARISSA Ortiz   Electronically signed: Eric Lewis, 11-28-23 @ 06:55    I, KARISSA Ortiz, personally performed the services described in this documentation. all medical record entries made by the jumaibdwain were at my direction and in my presence. I have reviewed the chart and agree that the record reflects my personal performance and is accurate and complete  Electronically signed: KARISSA Ortiz

## 2023-11-28 NOTE — PHYSICAL THERAPY INITIAL EVALUATION ADULT - GENERAL OBSERVATIONS, REHAB EVAL
Pt admitted s/p fall on face, CTH & CTA Head (-) for acue injury/hemorhage, CT A&P showing s/p ascending aortic repair, CT maxillofacial (-) for fx, does show left periorbital soft tissue flowing & left scalp hematoma. Pt now with left periorbital hematoma and frontal scalp hematoma, left eye posterior vitreous detachment. Pt received semi-supine in bed in NAD, VSS, A&Ox3 (confused on date), +BP cuff, +tele, left eye noted to be swollen shut/bloody, pt agreeable to PT, denies pain. mandarian  used.

## 2023-11-28 NOTE — PROGRESS NOTE ADULT - SUBJECTIVE AND OBJECTIVE BOX
[facility] DEPARTMENT OF OPHTHALMOLOGY  ------------------------------------------------------------------------------  Kajal Jones MD, PGY-3  Contact: TEAMS  ------------------------------------------------------------------------------    Interval History: No acute events overnight. Today patient denying blurred vision, eye pain.    MEDICATIONS  (STANDING):  artificial tears (preservative free) Ophthalmic Solution 1 Drop(s) Both EYES four times a day  dextrose 50% Injectable 25 Gram(s) IV Push once  dextrose 50% Injectable 12.5 Gram(s) IV Push once  glucagon  Injectable 1 milliGRAM(s) IntraMuscular once  influenza  Vaccine (HIGH DOSE) 0.7 milliLiter(s) IntraMuscular once  insulin lispro (ADMELOG) corrective regimen sliding scale   SubCutaneous at bedtime  insulin lispro (ADMELOG) corrective regimen sliding scale   SubCutaneous three times a day before meals  pantoprazole    Tablet 40 milliGRAM(s) Oral before breakfast  phenytoin   Suspension 100 milliGRAM(s) Oral at bedtime  primidone 250 milliGRAM(s) Oral at bedtime  sodium chloride 0.9% lock flush 3 milliLiter(s) IV Push every 8 hours  sodium chloride 0.9%. 1000 milliLiter(s) (50 mL/Hr) IV Continuous <Continuous>    MEDICATIONS  (PRN):  dextrose Oral Gel 15 Gram(s) Oral once PRN Blood Glucose LESS THAN 70 milliGRAM(s)/deciliter      VITALS: T(C): 36.5 (11-28-23 @ 12:17)  T(F): 97.7 (11-28-23 @ 12:17), Max: 97.7 (11-28-23 @ 12:17)  HR: 86 (11-28-23 @ 12:17) (74 - 113)  BP: 114/61 (11-28-23 @ 12:17) (73/40 - 179/89)  RR:  (15 - 43)  SpO2:  (96% - 100%)  Wt(kg): --  General: AAO x 3, appropriate mood and affect    Ophthalmology Exam:  Visual acuity (cc with +2.50 readers): 20/40 OD; 20/30 OS   Pupils: PERRL OU, no APD  Ttono: 17 OD 20 OS   Extraocular movements (EOMs): Full OU, no pain, no diplopia  Confrontational Visual Field (CVF): Full OD, Full OS    Pen Light Exam (PLE)  External: Flat OD ; periorbital edema and ecchymoses OS   Lids/Lashes/Lacrimal Ducts: Flat OD ; lid edema and ecchymoses OS   Sclera/Conjunctiva: W+Q OU   Cornea: Cl OD, tr SPK OS  Anterior Chamber: D+F OU     Iris: Flat OU  Lens: Cataracts OU     Fundus Exam: dilated with 1% tropicamide and 2.5% phenylephrine  Approval obtained from primary team for dilation  Patient aware that pupils can remained dilated for at least 4-6 hours  Exam performed with 20D lens    Vitreous: wnl OD, PVD OU  Disc, cup/disc: sharp and pink, 0.3 OD 0.4 OS  Macula: wnl OU  Vessels: wnl OU  Periphery: wnl OU   [facility] DEPARTMENT OF OPHTHALMOLOGY  ------------------------------------------------------------------------------  Kajal Jones MD, PGY-3  Contact: TEAMS  ------------------------------------------------------------------------------    Interval History: No acute events overnight. Today patient denying blurred vision, eye pain.    MEDICATIONS  (STANDING):  artificial tears (preservative free) Ophthalmic Solution 1 Drop(s) Both EYES four times a day  dextrose 50% Injectable 25 Gram(s) IV Push once  dextrose 50% Injectable 12.5 Gram(s) IV Push once  glucagon  Injectable 1 milliGRAM(s) IntraMuscular once  influenza  Vaccine (HIGH DOSE) 0.7 milliLiter(s) IntraMuscular once  insulin lispro (ADMELOG) corrective regimen sliding scale   SubCutaneous at bedtime  insulin lispro (ADMELOG) corrective regimen sliding scale   SubCutaneous three times a day before meals  pantoprazole    Tablet 40 milliGRAM(s) Oral before breakfast  phenytoin   Suspension 100 milliGRAM(s) Oral at bedtime  primidone 250 milliGRAM(s) Oral at bedtime  sodium chloride 0.9% lock flush 3 milliLiter(s) IV Push every 8 hours  sodium chloride 0.9%. 1000 milliLiter(s) (50 mL/Hr) IV Continuous <Continuous>    MEDICATIONS  (PRN):  dextrose Oral Gel 15 Gram(s) Oral once PRN Blood Glucose LESS THAN 70 milliGRAM(s)/deciliter      VITALS: T(C): 36.5 (11-28-23 @ 12:17)  T(F): 97.7 (11-28-23 @ 12:17), Max: 97.7 (11-28-23 @ 12:17)  HR: 86 (11-28-23 @ 12:17) (74 - 113)  BP: 114/61 (11-28-23 @ 12:17) (73/40 - 179/89)  RR:  (15 - 43)  SpO2:  (96% - 100%)  Wt(kg): --  General: AAO x 3, appropriate mood and affect    Ophthalmology Exam:  Visual acuity (cc with +2.50 readers): 20/40 OD; 20/30 OS   Pupils: PERRL OU, no APD  Ttono: 17 OD 20 OS   Extraocular movements (EOMs): Full OU, no pain, no diplopia  Confrontational Visual Field (CVF): Full OD, Full OS    Pen Light Exam (PLE)  External: Flat OD ; periorbital edema and ecchymoses OS   Lids/Lashes/Lacrimal Ducts: Flat OD ; lid edema and ecchymoses OS   Sclera/Conjunctiva: W+Q OU   Cornea: Cl OD, tr SPK OS  Anterior Chamber: D+F OU     Iris: Flat OU  Lens: Cataracts OU     Fundus Exam: dilated with 1% tropicamide and 2.5% phenylephrine  Approval obtained from primary team for dilation  Patient aware that pupils can remained dilated for at least 4-6 hours  Exam performed with 20D lens    Vitreous: wnl OD, PVD OU  Disc, cup/disc: sharp and pink, 0.3 OD 0.4 OS  Macula: wnl OU  Vessels: wnl OU  Periphery: wnl OU   [facility] DEPARTMENT OF OPHTHALMOLOGY  ------------------------------------------------------------------------------  Kajal Jones MD, PGY-3  Contact: TEAMS  ------------------------------------------------------------------------------    Interval History: No acute events overnight. Today patient denying blurred vision, eye pain.    MEDICATIONS  (STANDING):  artificial tears (preservative free) Ophthalmic Solution 1 Drop(s) Both EYES four times a day  dextrose 50% Injectable 25 Gram(s) IV Push once  dextrose 50% Injectable 12.5 Gram(s) IV Push once  glucagon  Injectable 1 milliGRAM(s) IntraMuscular once  influenza  Vaccine (HIGH DOSE) 0.7 milliLiter(s) IntraMuscular once  insulin lispro (ADMELOG) corrective regimen sliding scale   SubCutaneous at bedtime  insulin lispro (ADMELOG) corrective regimen sliding scale   SubCutaneous three times a day before meals  pantoprazole    Tablet 40 milliGRAM(s) Oral before breakfast  phenytoin   Suspension 100 milliGRAM(s) Oral at bedtime  primidone 250 milliGRAM(s) Oral at bedtime  sodium chloride 0.9% lock flush 3 milliLiter(s) IV Push every 8 hours  sodium chloride 0.9%. 1000 milliLiter(s) (50 mL/Hr) IV Continuous <Continuous>    MEDICATIONS  (PRN):  dextrose Oral Gel 15 Gram(s) Oral once PRN Blood Glucose LESS THAN 70 milliGRAM(s)/deciliter      VITALS: T(C): 36.5 (11-28-23 @ 12:17)  T(F): 97.7 (11-28-23 @ 12:17), Max: 97.7 (11-28-23 @ 12:17)  HR: 86 (11-28-23 @ 12:17) (74 - 113)  BP: 114/61 (11-28-23 @ 12:17) (73/40 - 179/89)  RR:  (15 - 43)  SpO2:  (96% - 100%)  Wt(kg): --  General: AAO x 3, appropriate mood and affect    Ophthalmology Exam:  Visual acuity (cc with +2.50 readers): 20/40 OD; 20/30 OS   Pupils: PERRL OU, no APD  Ttono: 17 OD 20 OS   Extraocular movements (EOMs): Full OU, no pain, no diplopia  Confrontational Visual Field (CVF): Full OD, Full OS    Pen Light Exam (PLE)  External: Flat OD ; periorbital edema and ecchymoses OS   Lids/Lashes/Lacrimal Ducts: Flat OD ; lid edema and ecchymoses OS   Sclera/Conjunctiva: W+Q OU   Cornea: Cl OD, tr SPK OS  Anterior Chamber: D+F OU     Iris: Flat OU  Lens: Cataracts OU     Fundus Exam: dilated with 1% tropicamide and 2.5% phenylephrine  Approval obtained from primary team for dilation  Patient aware that pupils can remained dilated for at least 4-6 hours  Exam performed with 20D lens    Vitreous: wnl OD, PVD OU  Disc, cup/disc: sharp and pink, 0.3 OD 0.4 OS  Macula: wnl OU  Vessels: wnl OU  Periphery: wnl OU

## 2023-11-28 NOTE — CONSULT NOTE ADULT - ASSESSMENT
Chantal Guadalupe is an 83 y.o. M PMHx significant for epilepsy, HTN, BPH, hypothyroidism, who presents to the ED s/p fall to pavement .  Patient had direct impact to left side of face presents with severe ecchymosis of the left eye which is closed shut at this time.  Son who accompanies patient and translates as patient is non-English speaking, states patient was walking with a umbrella 5 steps in front of him.  Umbrella slipped causing him to fall to the floor.  Patient's son states prior to fall patient was not lightheaded or dizzy. He denies LOC after fall.  Patient did not appear to be confused after fall.  Additionally, c.o. pain to the L. knee which has a superficial knee abrasion. noticed with abn creatinine.      1- CKD III based on previous blood work in the system when reviewed  2- HTN   3- syncope     cr seems steady range  trend hb   acidosis trend bicarb in am   trend hb given anemia   acidosis  repeat bicarb in am   tele monitor   d/w ctu team when seen earlier

## 2023-11-28 NOTE — PHYSICAL THERAPY INITIAL EVALUATION ADULT - IMPAIRMENTS FOUND, PT EVAL
vision impaired/aerobic capacity/endurance/gait, locomotion, and balance/muscle strength/poor safety awareness

## 2023-11-28 NOTE — PHYSICAL THERAPY INITIAL EVALUATION ADULT - LIVES WITH, PROFILE
2 sons in a house with 3 steps to enter, 1 flight inside (however pt does state he can stay on 1st floor)/children

## 2023-11-28 NOTE — CHART NOTE - NSCHARTNOTEFT_GEN_A_CORE
TERTIARY TRAUMA SURVEY  ------------------------------------------------------------------------------------    Date of TTS: 11/28/23  Time:   Admit Date:  11/27/23  Trauma Activation: Consult      HPI:  11/27/23: Chantal Guadalupe is an 83 y.o. M PMHx significant for epilepsy, HTN, BPH, hypothyroidism, who presents to the ED s/p fall to pavement approximately 15:00 today.  Patient had direct impact to left side of face presents with severe ecchymosis of the left eye which is closed shut at this time.  Son who accompanies patient and translates as patient is non-English speaking, states patient was walking with a umbrella 5 steps in front of him.  Umbrella slipped causing him to fall to the floor.  Patient's son states prior to fall patient was not lightheaded or dizzy. He denies LOC after fall.  Patient did not appear to be confused after fall.  Additionally, c.o. pain to the L. knee which has a superficial knee abrasion.  Denies HA, CP, SOB, ABD pain, or any other complaints at this time. Pt does not wear corrective lens. (27 Nov 2023 23:35)      INTERVAL EVENTS:   - Patient admitted to [facility] CTU for BP control, now off all HTN medication drips.    PAST MEDICAL & SURGICAL HISTORY:  Hypertension      Seizure      S/P aortic dissection repair          FAMILY HISTORY:      ALLERGIES: No Known Allergies      CURRENT MEDICATIONS  artificial tears (preservative free) Ophthalmic Solution 1 Drop(s) Both EYES four times a day  chlorhexidine 0.12% Liquid 15 milliLiter(s) Swish and Spit once  dextrose 5%. 1000 milliLiter(s) IV Continuous <Continuous>  dextrose 5%. 1000 milliLiter(s) IV Continuous <Continuous>  dextrose 50% Injectable 25 Gram(s) IV Push once  dextrose 50% Injectable 25 Gram(s) IV Push once  dextrose 50% Injectable 12.5 Gram(s) IV Push once  dextrose Oral Gel 15 Gram(s) Oral once PRN  glucagon  Injectable 1 milliGRAM(s) IntraMuscular once  influenza  Vaccine (HIGH DOSE) 0.7 milliLiter(s) IntraMuscular once  insulin lispro (ADMELOG) corrective regimen sliding scale   SubCutaneous at bedtime  insulin lispro (ADMELOG) corrective regimen sliding scale   SubCutaneous three times a day before meals  pantoprazole    Tablet 40 milliGRAM(s) Oral before breakfast  phenytoin   Suspension 100 milliGRAM(s) Oral at bedtime  primidone 250 milliGRAM(s) Oral at bedtime  sodium chloride 0.9% lock flush 3 milliLiter(s) IV Push every 8 hours  sodium chloride 0.9%. 1000 milliLiter(s) IV Continuous <Continuous>    -----------------------------------------------------------------------------------    VITAL SIGNS:  T(C): 36.2 (11-28-23 @ 08:00), Max: 36.4 (11-27-23 @ 16:15)  HR: 86 (11-28-23 @ 11:35) (74 - 113)  BP: 114/56 (11-28-23 @ 11:35)  RR: 29 (11-28-23 @ 11:35) (15 - 43)  SpO2: 100% (11-28-23 @ 11:35) (96% - 100%)    11-27-23 @ 07:01  -  11-28-23 @ 07:00  --------------------------------------------------------  IN: 1742.5 mL / OUT: 325 mL / NET: 1417.5 mL    11-28-23 @ 07:01  -  11-28-23 @ 11:58  --------------------------------------------------------  IN: 175 mL / OUT: 250 mL / NET: -75 mL      Weight (kg): 62.9 (11-27-23 @ 22:35)    PHYSICAL EXAM:  General: NAD  HEENT: Left eye ecchymosis and hematoma, TTP, eye partially swollen shut, left eyebrow superior abrasion.  Neck: Soft, supple, full ROM. No cervical or paraspinal tenderness.   Cardio: RRR.   Chest: Good effort, CTAB. No chest wall tenderness.  GI/Abd: Soft, NT/ND.  Vascular: Extremities warm; B/L UE and LE pulses 2+  Skin: Normal color, Left knee abrasion,   Musculoskeletal: All 4 extremities moving spontaneously, no limitations. Full ROM of shoulders, elbows, wrists, fingers, knees, ankles bilaterally. No tenderness to palpation of joints or extremities.  Neuro: Strength 5/5 in B/L UE/LE. Sensation to light touch intact in B/L UE/LE.                CRANIAL NERVES: II - normal visual acuity testing with Snellen. III/IV/VI - EOM's intact, painless. V - Normal sensation throughout 3 branches. VII - Normal and symmetric eyebrow raise; cheek puff symmetric; normal and symmetric smile; Normal strength with eye closing b/l.  IX/X - Normal palate rise. XI - normal shoulder shrug, neck flexion & lateral rotation. XII - Normal and symmetric tongue protrusion.      LABS:  LABS:                        11.1   7.99  )-----------( 134      ( 27 Nov 2023 22:08 )             34.0     11-28    142  |  111<H>  |  60<H>  ----------------------------<  162<H>  4.3   |  17<L>  |  1.75<H>    Ca    8.1<L>      28 Nov 2023 11:09  Phos  3.8     11-28  Mg     2.4     11-28    TPro  6.2  /  Alb  3.7  /  TBili  0.2  /  DBili  x   /  AST  17  /  ALT  15  /  AlkPhos  70  11-28    Lactate:    PT/INR - ( 28 Nov 2023 03:24 )   PT: 12.1 sec;   INR: 1.10 ratio         PTT - ( 28 Nov 2023 03:24 )  PTT:31.6 sec  ABG - ( 28 Nov 2023 02:50 )  pH, Arterial: 7.34  pH, Blood: x     /  pCO2: 32    /  pO2: 101   / HCO3: 17    / Base Excess: -7.6  /  SaO2: 98.7                    Urinalysis Basic - ( 28 Nov 2023 11:09 )    Color: x / Appearance: x / SG: x / pH: x  Gluc: 162 mg/dL / Ketone: x  / Bili: x / Urobili: x   Blood: x / Protein: x / Nitrite: x   Leuk Esterase: x / RBC: x / WBC x   Sq Epi: x / Non Sq Epi: x / Bacteria: x        IMAGING:        MICROBIOLOGY:      ------------------------------------------------------------------------------------------  RADIOLOGICAL FINDINGS REVIEW:  ***  CXR:   Pelvis Films:    C-spine/CTH/CT brain/ CT angio Brain + Neck:  < from: CT Cervical Spine No Cont (11.27.23 @ 22:23) >      ACC: [id number] EXAM:  CT MAXILLOFACIAL  IC   ORDERED BY:  SHALONDA DIEHL     ACC: [id number] EXAM:  CT CERVICAL SPINE   ORDERED BY:  SHALONDA DIEHL     ACC: [id number] EXAM:  CT BRAIN   ORDERED BY:  SHALONDA DIEHL     ACC: [id number] EXAM:  CT ANGIO NECK (W)AW IC   ORDERED BY:  SHALONDA DIEHL     ACC: [id number] EXAM:  CT ANGIO BRAIN (W)AW IC   ORDERED BY:  SHALONDA DIEHL     PROCEDURE DATE:  11/27/2023          INTERPRETATION:  CLINICAL HISTORY: Status post fall with trauma.    TECHNIQUE:  Noncontrast head CT and cervical spine was followed by CT images acquired   through the  neck and head  during the arterial phase. Follow by axial CT   imaging of the maxillofacial bones. Conventional as well as 3-dimensional   MIP reformats were generated.    CONTRAST/COMPLICATIONS:  IV Contrast: NONE (accession [id number]), NONE (accession [id number]), IV   contrast documented in unlinked concurrent exam (accession [id number]),   Omnipaque 300 (accession [id number])  70 cc administered   30 cc discarded  Oral Contrast: NONE  Complications: None reported at time of study completion    COMPARISON STUDY: None.    FINDINGS:    NONCONTRAST CT HEAD:    There is no acute intracranial hemorrhage, mass effect, midline shift,   extra-axial collection, hydrocephalus, or evidence ofacute vascular   territorial infarction. Mild patchy hypodensities within the   periventricular and subcortical white matter, although nonspecific,   likely reflect chronic microvascular disease. Cerebral volume loss   contributes to prominence of the ventricles and sulci.    Mastoid air cells are clear. Left frontal scalp hematoma. No calvarial   fracture.    CT MAXILLOFACIAL:    Left periorbital soft tissue swelling and left frontal scalp hematoma. A   few foci of hypodensity within the scalp hematoma may represent foci of   active bleeding. No acute maxillofacial bone fracture. Intraorbital   contents including the globes, extraocular muscles, and optic nerves are   intact. No retrobulbar hematoma.    Moderate paranasal sinus mucosal thickening. No hemorrhagic air-fluid   level.    No temporomandibular joint dislocation. Mandible is intact.    NONCONTRAST CT CERVICAL SPINE:    No acute cervical spine fracture or evidence of traumatic malalignment.   Preservation of the normal cervical lordosis. Craniocervical junction is   unremarkable. No facet joint dislocation. No prevertebral soft tissue   swelling.    There are multilevel degenerative change of the spine characterized by   degenerative disc disease as well as uncovertebral andfacet joint   arthrosis, contributing to varying degrees of neuroforaminal and spinal   canal stenoses.    CT ANGIOGRAPHY NECK:    Thoracic aorta and branch vessels: Partially imaged dissection involving   the aortic arch. Dissection flap extends into the proximal right   brachiocephalic artery. Patent.  No atherosclerosis.  No flow-limiting   stenosis.  No evidence of dissection.    Right carotid system: Patent.  No atherosclerosis.  No hemodynamically   significant stenosis using NASCET criteria.  No evidence of dissection.    Left carotid system: Patent. Predominantly noncalcified atherosclerotic   plaque of the proximal left internal carotid artery with focal short   segment flap-like filling defect, which may represent a age-indeterminate   dissection.  No hemodynamically significant stenosis using NASCET   criteria.  No evidence of dissection.    Vertebral arteries: Patent.  No atherosclerosis.  No flow-limiting   stenosis.  No evidence of dissection.    Soft tissues of the neck: Hypoattenuating right thyroid lobe nodules..    Visualized upper chest: Unremarkable.    CT ANGIOGRAPHY BRAIN:    Internal carotid arteries: Patent bilaterally.  No flow limiting stenosis.    Anterior cerebral arteries: Patent bilaterally without flow limiting   stenosis.    Middle cerebral arteries: Patent bilaterally without flow limiting    stenosis.    Anterior communicating artery: Poorly visualized    Right posterior communicating artery: Visualized.    Left posterior communicating artery: Poorly visualized.    Posterior cerebral arteries: Patent bilaterally without stenosis.    Vertebrobasilar: Patent without stenosis. The distal vertebral arteries   are similar in caliber.    Vascular lesions: No evidence of intracranial aneurysm within limits of   CTA technique.  Tiny aneurysms may be beyond the resolution of this   examination.    Dural venous sinuses: Grossly patent.    IMPRESSION:    Noncontrast CT Head: No acute intracranial hemorrhage or calvarial   fracture.    CT maxillofacial: No acute maxillofacial bone fracture. Left periorbital   soft tissue swelling at left frontal scalp hematoma. Foci of hyperdensity   within the left frontal scalp hematoma, most consistent with active   bleeding.    CT cervical spine: No acute cervical spine fracture or evidence of   traumatic malalignment.    CTA Neck: Noncalcified atherosclerotic plaque at the proximal left   internal carotid artery with short segment flap-like opacity, which may   represent sequelae of an age-indeterminate dissection. No significant   flow-limiting stenosis within the cervical carotid or vertebral arteries.    Partially imaged thoracic aortic dissection. Correlate with CTA chest   performed concurrently.    CTA Head: No proximal large vessel occlusion orsignificant stenosis.    --- End of Report ---      < end of copied text >      T/L/S Spine Films:   Extremity Films:   Chest CT:   ACC: [id number] EXAM:  CT ANGIO CHEST AORTA WAWIC   ORDERED BY:  SHALONDA DIEHL     ACC: [id number] EXAM:  CT ANGIO ABD PELV (W)AW IC   ORDERED BY:  SHALONDA DIEHL     PROCEDURE DATE:  11/27/2023          INTERPRETATION:  CLINICAL INFORMATION: Aortic dissection. Patient with   known history of aortic dissection, partially imaged on same-day CT neck.   Diagnostic study for complete evaluation.    COMPARISON: CT chest 10/11/2013 and CT chest abdomen pelvis 11/9/2021    CONTRAST/COMPLICATIONS:  IV Contrast: IV contrast documented in unlinked concurrent exam   (accession [id number]), Omnipaque 350 (accession [id number])  125 cc   administered   25 cc discarded  Oral Contrast: NONE  Complications: None reported at time of study completion    PROCEDURE:  CT Angiography of the Chest, Abdomen and Pelvis.  Gated precontrast imaging was performed through the heart followed by   gated CT Angiography of the heart with subsequent non-gated arterial   phase imaging of the chest, abdomen and pelvis.  [place] coronal reformats were performed as well as 3D (MIP)   reconstructions.    FINDINGS:  CT ANGIOGRAM AORTA: Status post ascending thoracic aortic repair. Aortic   dissection is identified within the aortic arch and extending along the   descending thoracic and proximal abdominal aorta to just above the level   of the origin of the superior mesenteric artery. False lumen demonstrates   peripheral thrombus as well as mixing. There is a type A aortic   dissection status post repair with persistentpartial filling of the   false lumen, which demonstrates areas of thrombosis. True lumen lumen   supplies the major arch branches, superior mesenteric, bilateral renal,   and inferior mesenteric arteries. The celiac artery is supplied by the   false lumen and demonstrates aneurysmal dilatation up to 1.3 cm (11:307).   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  Transmural diameters including false and true lumens as follows:  3.6 cm at the aorticroot.  3.2 cm in the mid ascending aorta.  5.7 cm at the distal arch (10:51)  5.3 cm in the mid descending (10:44).  3.7 cm at the level of the diaphragmatic hiatus.    CHEST:  LUNGS AND LARGE AIRWAYS: Patent central airways. No pulmonary nodules.  PLEURA: No pleural effusion.  VESSELS: Normal caliber main pulmonary artery. No central PE. For details   regarding aortic angiogram, please see above.  HEART: Cardiomegaly. No pericardial effusion.  MEDIASTINUM AND CHAPIN: No lymphadenopathy.  CHEST WALL AND LOWER NECK: Sternotomy wires. Hypodense right thyroid lobe   nodules.    ABDOMEN AND PELVIS:  LIVER: Within normal limits.  BILE DUCTS: Normal caliber.  GALLBLADDER: Within normal limits.  SPLEEN: Within normal limits.  PANCREAS: Within normal limits.  ADRENALS: Left adrenal gland nodular thickening, similar to prior.  KIDNEYS/URETERS: No hydronephrosis. Left parapelvic cysts. Right renal   cyst. Additional bilateral low-attenuation lesions too small to   accurately characterize.    BLADDER:Within normal limits.  REPRODUCTIVE ORGANS: Prostate within normal limits.    BOWEL: No bowel obstruction. Nonspecific area of enhancement along the   right rectal wall. Appendix is normal.  PERITONEUM: No ascites.  VESSELS: See above for details regarding aortic angiogram. 1.0 cm   pseudoaneurysm of the right common femoral artery with adjacent surgical   clips.  RETROPERITONEUM/LYMPH NODES: Degenerative changes.  ABDOMINAL WALL: Within normal limits.  BONES: Degenerative changes.    IMPRESSION:    Status post ascending thoracic aortic repair. Aortic dissection distal to   the repair involving the aortic arch, descending thoracic, and proximal   abdominal aorta. Thoracic aorta measures up to 5.7 cm at the level of the   distal arch. Celiac artery supplied by the false lumen and is mildly   aneurysmal proximally.    Right common femoral artery pseudoaneurysm.    Nonspecific area of enhancement along the right rectal wall, for which   nonemergent follow-up direct visualization may be obtained for further   characterization.      --- End of Report ---      ABD/Pelvis CT:       List Injuries Identified to Date:    Trauma to eye, left    Borderline systolic HTN    H/O: HTN (hypertension)        List Operative and Interventional Radiological Procedures: none    Consults (Date):  [x] CT surgery/CTU  [x] opthalmology  [] Neurosurgery   [] Orthopedic Surgery  [] Spine Surgery  [] Plastic Surgery  [] ENT  [] Urology  [] PM&R  [] Social Work    INTERPRETATION/ASSESSMENT:   CHANTAL GUADALUPE is a 83y Male who required a tertiary survey due to fall from standing. Admitted to CTU for tight BP control due to history of TAA with repair.     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

## 2023-11-28 NOTE — PATIENT PROFILE ADULT - STATED REASON FOR ADMISSION
I was coming out of my garage and was using a long umbrella as a cane and it caused me to trip and fall.

## 2023-11-28 NOTE — CONSULT NOTE ADULT - ASSESSMENT
ASSESSMENT:  83yM w/ PMHx of known Type A aortic dissection seen as Trauma Consult s/p fall.  Trauma assessment in ED: ABCs intact , GCS 15 , AAOx3 , with physical exam findings, imaging, and labs as documented above, injuries are identified:   - left frontal scalp hematoma w foci of contrast c/w bleed  - L knee abrasion  - L eye posterior vitreus detachment    PLAN:   - BP control per CTICU  - f/u optho recs  - trend H&H, monitor hematoma     Plan to be discussed with Dr. Babb    Trauma Surgery [id number] ASSESSMENT:  83yM w/ PMHx of known Type A aortic dissection seen as Trauma Consult s/p fall.  Trauma assessment in ED: ABCs intact , GCS 15 , AAOx3 , with physical exam findings, imaging, and labs as documented above, injuries are identified:   - left frontal scalp hematoma w foci of contrast c/w bleed  - L knee abrasion  - L eye posterior vitreus detachment    PLAN:   - BP control per CTICU  - f/u optho recs  - trend H&H, monitor hematoma   - tertiary trauma survery in AM    Plan discussed with surgical attending, Dr. Babb    Trauma Surgery [phone]

## 2023-11-28 NOTE — PROGRESS NOTE ADULT - SUBJECTIVE AND OBJECTIVE BOX
Subjective ' hello  I will be Ok "    VITAL SIGNS    Telemetry: NSR    Vital Signs Last 24 Hrs  T(C): 36.5 (23 @ 12:17), Max: 36.5 (23 @ 12:17)  T(F): 97.7 (23 @ 12:17), Max: 97.7 (23 @ 12:17)  HR: 86 (23 12:17) (74 - 113)  BP: 114/61 (23 @ 12:17) (73/40 - 179/89)  RR: 18 (23 @ 12:17) (15 - 43)  SpO2: 99% (23 @ 12:17) (96% - 100%)              @ 07:01  -   @ 07:00  --------------------------------------------------------  IN: 1742.5 mL / OUT: 325 mL / NET: 1417.5 mL     @ 07:01  -   @ 13:40  --------------------------------------------------------  IN: 565 mL / OUT: 350 mL / NET: 215 mL    Daily Height in cm: 162.56 (2023 16:15)    Daily Weight in k.1 (2023 00:00)                            11.1   7.99  )-----------( 134      ( 2023 22:08 )             34.0           142  |  111<H>  |  60<H>  ----------------------------<  162<H>  4.3   |  17<L>  |  1.75<H>    Ca    8.1<L>      2023 11:09  Phos  3.8       Mg     2.4         TPro  6.2  /  Alb  3.7  /  TBili  0.2  /  DBili  x   /  AST  17  /  ALT  15  /  AlkPhos  70        < from: CT Maxillofacial w/ IV Cont (23 @ 22:24) >    Noncontrast CT Head: No acute intracranial hemorrhage or calvarial   fracture.    CT maxillofacial: No acute maxillofacial bone fracture. Left periorbital   soft tissue swelling at left frontal scalp hematoma. Foci of hyperdensity   within the left frontal scalp hematoma, most consistent with active   bleeding.    CT cervical spine: No acute cervical spine fracture or evidence of   traumatic malalignment.    CTA Neck: Noncalcified atherosclerotic plaque at the proximal left   internal carotid artery with short segment flap-like opacity, which may   represent sequelae of an age-indeterminate dissection. No significant   flow-limiting stenosis within the cervical carotid or vertebral arteries.    Partially imaged thoracic aortic dissection. Correlate with CTA chest   performed concurrently.    CTA Head: No proximal large vessel occlusion orsignificant stenosis.      < end of copied text >  < from: CT Angio Neck w/ IV Cont (23 @ 22:28) >  Noncontrast CT Head: No acute intracranial hemorrhage or calvarial   fracture.    CT maxillofacial: No acute maxillofacial bone fracture. Left periorbital   soft tissue swelling at left frontal scalp hematoma. Foci of hyperdensity   within the left frontal scalp hematoma, most consistent with active   bleeding.    CT cervical spine: No acute cervical spine fracture or evidence of   traumatic malalignment.    CTA Neck: Noncalcified atherosclerotic plaque at the proximal left   internal carotid artery with short segment flap-like opacity, which may   represent sequelae of an age-indeterminate dissection. No significant   flow-limiting stenosis within the cervical carotid or vertebral arteries.    Partially imaged thoracic aortic dissection. Correlate with CTA chest   performed concurrently.    CTA Head: No proximal large vessel occlusion orsignificant stenosis.      < end of copied text >  < from: CT Angio Chest Aorta w/wo IV Cont (23 @ 22:32) >    Status post ascending thoracic aortic repair. Aortic dissection distal to   the repair involving the aortic arch, descending thoracic, and proximal   abdominal aorta. Thoracic aorta measures up to 5.7 cm at the level of the   distal arch. Celiac artery supplied by the false lumen and is mildly   aneurysmal proximally.    Right common femoral artery pseudoaneurysm.    Nonspecific area of enhancement along the right rectal wall, for which   nonemergent follow-up direct visualization may be obtained for further   characterization.    < end of copied text >    MEDICATIONS  (STANDING):  artificial tears (preservative free) Ophthalmic Solution 1 Drop(s) Both EYES four times a day  dextrose 50% Injectable 25 Gram(s) IV Push once  dextrose 50% Injectable 12.5 Gram(s) IV Push once  glucagon  Injectable 1 milliGRAM(s) IntraMuscular once  influenza  Vaccine (HIGH DOSE) 0.7 milliLiter(s) IntraMuscular once  insulin lispro (ADMELOG) corrective regimen sliding scale   SubCutaneous at bedtime  insulin lispro (ADMELOG) corrective regimen sliding scale   SubCutaneous three times a day before meals  pantoprazole    Tablet 40 milliGRAM(s) Oral before breakfast  phenytoin   Suspension 100 milliGRAM(s) Oral at bedtime  primidone 250 milliGRAM(s) Oral at bedtime  sodium chloride 0.9% lock flush 3 milliLiter(s) IV Push every 8 hours  sodium chloride 0.9%. 1000 milliLiter(s) (50 mL/Hr) IV Continuous <Continuous>    MEDICATIONS  (PRN):  dextrose Oral Gel 15 Gram(s) Oral once PRN Blood Glucose LESS THAN 70 milliGRAM(s)/deciliter      CAPILLARY BLOOD GLUCOSE      POCT Blood Glucose.: 128 mg/dL (2023 10:04)                             PHYSICAL EXAM        Neurology: alert and oriented x 3, nonfocal, no gross deficits  B/l ecchymotic periorbital area tender   CV : [id number]    Sternal Wound : well healed remote - type a repair  Lungs:    Abdomen: soft, nontender, nondistended, positive bowel sounds, last bowel movement     :voids               Extremities: warm well perfused equal strength throughout                                            Physical Therapy Rec:   Home  [  ]   Home w/ PT  [  ]  Rehab  [  ]    Discussed with Cardiothoracic Team at AM rounds.

## 2023-11-28 NOTE — CONSULT NOTE ADULT - SUBJECTIVE AND OBJECTIVE BOX
Trauma consult    CC: Patient is a 83y old  Male who presents with a chief complaint of s/p fall (27 Nov 2023 23:35)        HPI:  Chantal Guadalupe is an 83 y.o. M PMHx significant for epilepsy, HTN, BPH, hypothyroidism, who presents to the ED s/p fall to pavement approximately 15:00 today.  Patient had direct impact to left side of face presents with severe ecchymosis of the left eye which is closed shut at this time.  Son who accompanies patient and translates as patient is non-English speaking, states patient was walking with a umbrella 5 steps in front of him.  Umbrella slipped causing him to fall to the floor.  Patient's son states prior to fall patient was not lightheaded or dizzy. He denies LOC after fall.  Patient did not appear to be confused after fall.  Additionally, c.o. pain to the L. knee which has a superficial knee abrasion.  Denies HA, CP, SOB, ABD pain, or any other complaints at this time. Pt does not wear corrective lens.    On CT scan, was noted to have Type A aortic dissection on imaging which prompted CTICU admission for aggressive BP control, was noted later to have had repair in 2013 without significant change in size.       Primary Survey  A - airway intact  B - bilateral breath sounds and good chest rise  C - initially BP: 102/53 (11-27-23 @ 23:30), palpable pulses in all extremities  D - GCS 15 on arrival  Exposure obtained      Secondary survey  Gen: NAD  HEENT: L forehead hematoma nonpulsatile with overlying pressure dressing/ice pack over L eye. L periorbital edema noted. pupils dilated b/l (known from opthalmology)    CV: s1, s2, RRR  Pulm: CTA B/L  Chest: No TTP  Abd: Soft, ND, NT, no rebound, no guarding  Groin: Normal appearing  Ext: Palp radial b/l, palp DP b/l, L knee abrasion present. ROM intact.   Back: no TTP, no palpable runoff, stepoff, or deformity    PMH  Hypertension    Seizure      PSH  S/P aortic dissection repair      MEDS    Allergies    No Known Allergies    Intolerances        Social    Labs:                        11.1   7.99  )-----------( 134      ( 27 Nov 2023 22:08 )             34.0     11-27    139  |  108  |  73<H>  ----------------------------<  129<H>  5.9<H>   |  16<L>  |  1.98<H>    Ca    9.5      27 Nov 2023 22:08    TPro  8.0  /  Alb  4.9  /  TBili  0.2  /  DBili  x   /  AST  23  /  ALT  20  /  AlkPhos  92  11-27    PT/INR - ( 27 Nov 2023 22:08 )   PT: 18.5 sec;   INR: 1.71 ratio         PTT - ( 27 Nov 2023 22:08 )  PTT:>200.0 sec  Urinalysis Basic - ( 27 Nov 2023 22:08 )    Color: x / Appearance: x / SG: x / pH: x  Gluc: 129 mg/dL / Ketone: x  / Bili: x / Urobili: x   Blood: x / Protein: x / Nitrite: x   Leuk Esterase: x / RBC: x / WBC x   Sq Epi: x / Non Sq Epi: x / Bacteria: x      ABG - ( 28 Nov 2023 00:40 )  pH, Arterial: 7.37  pH, Blood: x     /  pCO2: 31    /  pO2: 95    / HCO3: 18    / Base Excess: -6.5  /  SaO2: 98.7            Imaging  < from: CT Angio Neck w/ IV Cont (11.27.23 @ 22:28) >  IMPRESSION:    Noncontrast CT Head: No acute intracranial hemorrhage or calvarial   fracture.    CT maxillofacial: No acute maxillofacial bone fracture. Left periorbital   soft tissue swelling at left frontal scalp hematoma. Foci of hyperdensity   within the left frontal scalp hematoma, most consistent with active   bleeding.    CT cervical spine: No acute cervical spine fracture or evidence of   traumatic malalignment.    CTA Neck: Noncalcified atherosclerotic plaque at the proximal left   internal carotid artery with short segment flap-like opacity, which may   represent sequelae of an age-indeterminate dissection. No significant   flow-limiting stenosis within the cervical carotid or vertebral arteries.    Partially imaged thoracic aortic dissection. Correlate with CTA chest   performed concurrently.    CTA Head: No proximal large vessel occlusion orsignificant stenosis.    --- End of Report ---    < end of copied text >  < from: Xray Chest 1 View- PORTABLE-Routine (Xray Chest 1 View- PORTABLE-Routine .) (11.11.21 @ 08:30) >  IMPRESSION: Cardiomegaly. Dilated descending thoracic aorta.  No radiographic evidence of active chest disease.  Please see prior 11/9/2021 chest CT exam    --- End of Report ---    < end of copied text >

## 2023-11-29 LAB
A1C WITH ESTIMATED AVERAGE GLUCOSE RESULT: 5.5 % — SIGNIFICANT CHANGE UP (ref 4–5.6)
A1C WITH ESTIMATED AVERAGE GLUCOSE RESULT: 5.5 % — SIGNIFICANT CHANGE UP (ref 4–5.6)
ALBUMIN SERPL ELPH-MCNC: 3.8 G/DL — SIGNIFICANT CHANGE UP (ref 3.3–5)
ALBUMIN SERPL ELPH-MCNC: 3.8 G/DL — SIGNIFICANT CHANGE UP (ref 3.3–5)
ALP SERPL-CCNC: 74 U/L — SIGNIFICANT CHANGE UP (ref 40–120)
ALP SERPL-CCNC: 74 U/L — SIGNIFICANT CHANGE UP (ref 40–120)
ALT FLD-CCNC: 20 U/L — SIGNIFICANT CHANGE UP (ref 10–45)
ALT FLD-CCNC: 20 U/L — SIGNIFICANT CHANGE UP (ref 10–45)
ANION GAP SERPL CALC-SCNC: 15 MMOL/L — SIGNIFICANT CHANGE UP (ref 5–17)
ANION GAP SERPL CALC-SCNC: 15 MMOL/L — SIGNIFICANT CHANGE UP (ref 5–17)
AST SERPL-CCNC: 30 U/L — SIGNIFICANT CHANGE UP (ref 10–40)
AST SERPL-CCNC: 30 U/L — SIGNIFICANT CHANGE UP (ref 10–40)
BILIRUB SERPL-MCNC: 0.2 MG/DL — SIGNIFICANT CHANGE UP (ref 0.2–1.2)
BILIRUB SERPL-MCNC: 0.2 MG/DL — SIGNIFICANT CHANGE UP (ref 0.2–1.2)
BUN SERPL-MCNC: 58 MG/DL — HIGH (ref 7–23)
BUN SERPL-MCNC: 58 MG/DL — HIGH (ref 7–23)
CALCIUM SERPL-MCNC: 8.5 MG/DL — SIGNIFICANT CHANGE UP (ref 8.4–10.5)
CALCIUM SERPL-MCNC: 8.5 MG/DL — SIGNIFICANT CHANGE UP (ref 8.4–10.5)
CHLORIDE SERPL-SCNC: 109 MMOL/L — HIGH (ref 96–108)
CHLORIDE SERPL-SCNC: 109 MMOL/L — HIGH (ref 96–108)
CO2 SERPL-SCNC: 16 MMOL/L — LOW (ref 22–31)
CO2 SERPL-SCNC: 16 MMOL/L — LOW (ref 22–31)
CREAT SERPL-MCNC: 2.18 MG/DL — HIGH (ref 0.5–1.3)
CREAT SERPL-MCNC: 2.18 MG/DL — HIGH (ref 0.5–1.3)
EGFR: 29 ML/MIN/1.73M2 — LOW
EGFR: 29 ML/MIN/1.73M2 — LOW
ESTIMATED AVERAGE GLUCOSE: 111 MG/DL — SIGNIFICANT CHANGE UP (ref 68–114)
ESTIMATED AVERAGE GLUCOSE: 111 MG/DL — SIGNIFICANT CHANGE UP (ref 68–114)
GLUCOSE BLDC GLUCOMTR-MCNC: 116 MG/DL — HIGH (ref 70–99)
GLUCOSE BLDC GLUCOMTR-MCNC: 116 MG/DL — HIGH (ref 70–99)
GLUCOSE BLDC GLUCOMTR-MCNC: 119 MG/DL — HIGH (ref 70–99)
GLUCOSE BLDC GLUCOMTR-MCNC: 119 MG/DL — HIGH (ref 70–99)
GLUCOSE BLDC GLUCOMTR-MCNC: 76 MG/DL — SIGNIFICANT CHANGE UP (ref 70–99)
GLUCOSE BLDC GLUCOMTR-MCNC: 76 MG/DL — SIGNIFICANT CHANGE UP (ref 70–99)
GLUCOSE SERPL-MCNC: 154 MG/DL — HIGH (ref 70–99)
GLUCOSE SERPL-MCNC: 154 MG/DL — HIGH (ref 70–99)
HCT VFR BLD CALC: 29.1 % — LOW (ref 39–50)
HCT VFR BLD CALC: 29.1 % — LOW (ref 39–50)
HGB BLD-MCNC: 9.6 G/DL — LOW (ref 13–17)
HGB BLD-MCNC: 9.6 G/DL — LOW (ref 13–17)
MAGNESIUM SERPL-MCNC: 2.6 MG/DL — SIGNIFICANT CHANGE UP (ref 1.6–2.6)
MAGNESIUM SERPL-MCNC: 2.6 MG/DL — SIGNIFICANT CHANGE UP (ref 1.6–2.6)
MCHC RBC-ENTMCNC: 32.8 PG — SIGNIFICANT CHANGE UP (ref 27–34)
MCHC RBC-ENTMCNC: 32.8 PG — SIGNIFICANT CHANGE UP (ref 27–34)
MCHC RBC-ENTMCNC: 33 GM/DL — SIGNIFICANT CHANGE UP (ref 32–36)
MCHC RBC-ENTMCNC: 33 GM/DL — SIGNIFICANT CHANGE UP (ref 32–36)
MCV RBC AUTO: 99.3 FL — SIGNIFICANT CHANGE UP (ref 80–100)
MCV RBC AUTO: 99.3 FL — SIGNIFICANT CHANGE UP (ref 80–100)
NRBC # BLD: 0 /100 WBCS — SIGNIFICANT CHANGE UP (ref 0–0)
NRBC # BLD: 0 /100 WBCS — SIGNIFICANT CHANGE UP (ref 0–0)
PLATELET # BLD AUTO: 110 K/UL — LOW (ref 150–400)
PLATELET # BLD AUTO: 110 K/UL — LOW (ref 150–400)
POTASSIUM SERPL-MCNC: 5.1 MMOL/L — SIGNIFICANT CHANGE UP (ref 3.5–5.3)
POTASSIUM SERPL-MCNC: 5.1 MMOL/L — SIGNIFICANT CHANGE UP (ref 3.5–5.3)
POTASSIUM SERPL-SCNC: 5.1 MMOL/L — SIGNIFICANT CHANGE UP (ref 3.5–5.3)
POTASSIUM SERPL-SCNC: 5.1 MMOL/L — SIGNIFICANT CHANGE UP (ref 3.5–5.3)
PROT SERPL-MCNC: 6.5 G/DL — SIGNIFICANT CHANGE UP (ref 6–8.3)
PROT SERPL-MCNC: 6.5 G/DL — SIGNIFICANT CHANGE UP (ref 6–8.3)
RBC # BLD: 2.93 M/UL — LOW (ref 4.2–5.8)
RBC # BLD: 2.93 M/UL — LOW (ref 4.2–5.8)
RBC # FLD: 14 % — SIGNIFICANT CHANGE UP (ref 10.3–14.5)
RBC # FLD: 14 % — SIGNIFICANT CHANGE UP (ref 10.3–14.5)
SODIUM SERPL-SCNC: 140 MMOL/L — SIGNIFICANT CHANGE UP (ref 135–145)
SODIUM SERPL-SCNC: 140 MMOL/L — SIGNIFICANT CHANGE UP (ref 135–145)
WBC # BLD: 6.33 K/UL — SIGNIFICANT CHANGE UP (ref 3.8–10.5)
WBC # BLD: 6.33 K/UL — SIGNIFICANT CHANGE UP (ref 3.8–10.5)
WBC # FLD AUTO: 6.33 K/UL — SIGNIFICANT CHANGE UP (ref 3.8–10.5)
WBC # FLD AUTO: 6.33 K/UL — SIGNIFICANT CHANGE UP (ref 3.8–10.5)

## 2023-11-29 RX ADMIN — SODIUM CHLORIDE 50 MILLILITER(S): 9 INJECTION INTRAMUSCULAR; INTRAVENOUS; SUBCUTANEOUS at 23:44

## 2023-11-29 RX ADMIN — SODIUM CHLORIDE 50 MILLILITER(S): 9 INJECTION INTRAMUSCULAR; INTRAVENOUS; SUBCUTANEOUS at 16:04

## 2023-11-29 RX ADMIN — Medication 100 MILLIGRAM(S): at 23:44

## 2023-11-29 RX ADMIN — Medication 1 DROP(S): at 18:33

## 2023-11-29 RX ADMIN — SODIUM CHLORIDE 3 MILLILITER(S): 9 INJECTION INTRAMUSCULAR; INTRAVENOUS; SUBCUTANEOUS at 22:43

## 2023-11-29 RX ADMIN — PRIMIDONE 250 MILLIGRAM(S): 250 TABLET ORAL at 23:11

## 2023-11-29 RX ADMIN — Medication 1 DROP(S): at 11:55

## 2023-11-29 RX ADMIN — SODIUM CHLORIDE 3 MILLILITER(S): 9 INJECTION INTRAMUSCULAR; INTRAVENOUS; SUBCUTANEOUS at 05:32

## 2023-11-29 RX ADMIN — PANTOPRAZOLE SODIUM 40 MILLIGRAM(S): 20 TABLET, DELAYED RELEASE ORAL at 05:32

## 2023-11-29 RX ADMIN — Medication 1 DROP(S): at 23:11

## 2023-11-29 RX ADMIN — SODIUM CHLORIDE 3 MILLILITER(S): 9 INJECTION INTRAMUSCULAR; INTRAVENOUS; SUBCUTANEOUS at 15:51

## 2023-11-29 NOTE — PROGRESS NOTE ADULT - SUBJECTIVE AND OBJECTIVE BOX
Chantal Guadalupe is an 83 y.o. M PMHx significant for epilepsy, HTN, BPH, hypothyroidism, who presents to the ED s/p fall to pavement approximately 15:00 today.  Patient had direct impact to left side of face presents with severe ecchymosis of the left eye which is closed shut at this time.  Son who accompanies patient and translates as patient is non-English speaking, states patient was walking with a umbrella 5 steps in front of him.  Umbrella slipped causing him to fall to the floor.  Patient's son states prior to fall patient was not lightheaded or dizzy. He denies LOC after fall.  Patient did not appear to be confused after fall.  Additionally, c.o. pain to the L. knee which has a superficial knee abrasion.  Denies HA, CP, SOB, ABD pain, or any other complaints at this time. Pt does not wear corrective lens. (27 Nov 2023 23:35)    patient was taken to CTU for monitoring then downgraded to CTS service for further care. Patient remained normotensive and no events on telemetry. CTS consulted for BP management and transfer to medicine service for continuation of PT services and discharge planning.     [facility] # [id number]    Patient feels well. eyes are improving. Patient just had eye drops placed by opthalmology denies any chest pain, eye pain, sob, palpitations, or headaches    tele NSR      PAST MEDICAL & SURGICAL HISTORY:  Hypertension      Seizure      S/P aortic dissection repair          Review of Systems:   CONSTITUTIONAL: No fever, weight loss, or fatigue  EYES: No eye pain, visual disturbances, or discharge  ENMT:  No difficulty hearing, tinnitus, vertigo; No sinus or throat pain  NECK: No pain or stiffness  BREASTS: No pain, masses, or nipple discharge  RESPIRATORY: No cough, wheezing, chills or hemoptysis; No shortness of breath  CARDIOVASCULAR: No chest pain, palpitations, dizziness, or leg swelling  GASTROINTESTINAL: No abdominal or epigastric pain. No nausea, vomiting, or hematemesis; No diarrhea or constipation. No melena or hematochezia.  GENITOURINARY: No dysuria, frequency, hematuria, or incontinence  NEUROLOGICAL: No headaches, memory loss, loss of strength, numbness, or tremors  SKIN: No itching, burning, rashes, or lesions   LYMPH NODES: No enlarged glands  ENDOCRINE: No heat or cold intolerance; No hair loss  MUSCULOSKELETAL: No joint pain or swelling; No muscle, back, or extremity pain  PSYCHIATRIC: No depression, anxiety, mood swings, or difficulty sleeping  HEME/LYMPH: No easy bruising, or bleeding gums  ALLERY AND IMMUNOLOGIC: No hives or eczema    Allergies    No Known Allergies    Intolerances        Social History:     FAMILY HISTORY:      MEDICATIONS  (STANDING):  artificial tears (preservative free) Ophthalmic Solution 1 Drop(s) Both EYES four times a day  dextrose 50% Injectable 25 Gram(s) IV Push once  dextrose 50% Injectable 12.5 Gram(s) IV Push once  glucagon  Injectable 1 milliGRAM(s) IntraMuscular once  influenza  Vaccine (HIGH DOSE) 0.7 milliLiter(s) IntraMuscular once  insulin lispro (ADMELOG) corrective regimen sliding scale   SubCutaneous three times a day before meals  insulin lispro (ADMELOG) corrective regimen sliding scale   SubCutaneous at bedtime  pantoprazole    Tablet 40 milliGRAM(s) Oral before breakfast  phenytoin   Suspension 100 milliGRAM(s) Oral at bedtime  primidone 250 milliGRAM(s) Oral at bedtime  sodium chloride 0.9% lock flush 3 milliLiter(s) IV Push every 8 hours  sodium chloride 0.9%. 1000 milliLiter(s) (50 mL/Hr) IV Continuous <Continuous>    MEDICATIONS  (PRN):  dextrose Oral Gel 15 Gram(s) Oral once PRN Blood Glucose LESS THAN 70 milliGRAM(s)/deciliter        CAPILLARY BLOOD GLUCOSE      POCT Blood Glucose.: 128 mg/dL (28 Nov 2023 10:04)    I&O's Summary    27 Nov 2023 07:01  -  28 Nov 2023 07:00  --------------------------------------------------------  IN: 1742.5 mL / OUT: 325 mL / NET: 1417.5 mL    28 Nov 2023 07:01  -  28 Nov 2023 14:38  --------------------------------------------------------  IN: 565 mL / OUT: 350 mL / NET: 215 mL        PHYSICAL EXAM:  GENERAL: NAD, well-developed  HEENT: Left eye ecchymoses, slight TTP; slightly closed, EOMI, PERRL, conjunctiva and sclera clearSupple, No JVD, periorbital echymosis  CHEST/LUNG: Clear to auscultation bilaterally; No wheeze  HEART: Regular rate and rhythm; No murmurs, rubs, or gallops  ABDOMEN: Soft, Nontender, Nondistended; Bowel sounds present  EXTREMITIES:  2+ Peripheral Pulses, No clubbing, cyanosis, or edema  PSYCH: AAOx3  NEUROLOGY: non-focal  SKIN: No rashes or lesions    LABS:                        11.1   7.99  )-----------( 134      ( 27 Nov 2023 22:08 )             34.0     11-28    142  |  111<H>  |  60<H>  ----------------------------<  162<H>  4.3   |  17<L>  |  1.75<H>    Ca    8.1<L>      28 Nov 2023 11:09  Phos  3.8     11-28  Mg     2.4     11-28    TPro  6.2  /  Alb  3.7  /  TBili  0.2  /  DBili  x   /  AST  17  /  ALT  15  /  AlkPhos  70  11-28    PT/INR - ( 28 Nov 2023 03:24 )   PT: 12.1 sec;   INR: 1.10 ratio         PTT - ( 28 Nov 2023 03:24 )  PTT:31.6 sec      Urinalysis Basic - ( 28 Nov 2023 11:09 )    Color: x / Appearance: x / SG: x / pH: x  Gluc: 162 mg/dL / Ketone: x  / Bili: x / Urobili: x   Blood: x / Protein: x / Nitrite: x   Leuk Esterase: x / RBC: x / WBC x   Sq Epi: x / Non Sq Epi: x / Bacteria: x    ct< from: CT Angio Chest Aorta w/wo IV Cont (11.27.23 @ 22:32) >  CHEST:  LUNGS AND LARGE AIRWAYS: Patent central airways. No pulmonary nodules.  PLEURA: No pleural effusion.  VESSELS: Normal caliber main pulmonary artery. No central PE. For details   regarding aortic angiogram, please see above.  HEART: Cardiomegaly. No pericardial effusion.  MEDIASTINUM AND CHAPIN: No lymphadenopathy.  CHEST WALL AND LOWER NECK: Sternotomy wires. Hypodense right thyroid lobe   nodules.    ABDOMEN AND PELVIS:  LIVER: Within normal limits.  BILE DUCTS: Normal caliber.  GALLBLADDER: Within normal limits.  SPLEEN: Within normal limits.  PANCREAS: Within normal limits.  ADRENALS: Left adrenal gland nodular thickening, similar to prior.  KIDNEYS/URETERS: No hydronephrosis. Left parapelvic cysts. Right renal   cyst. Additional bilateral low-attenuation lesions too small to   accurately characterize.    BLADDER:Within normal limits.  REPRODUCTIVE ORGANS: Prostate within normal limits.    BOWEL: No bowel obstruction. Nonspecific area of enhancement along the   right rectal wall. Appendix is normal.  PERITONEUM: No ascites.  VESSELS: See above for details regarding aortic angiogram. 1.0 cm   pseudoaneurysm of the right common femoral artery with adjacent surgical   clips.  RETROPERITONEUM/LYMPH NODES: Degenerative changes.  ABDOMINAL WALL: Within normal limits.  BONES: Degenerative changes.    IMPRESSION:    Status post ascending thoracic aortic repair. Aortic dissection distal to   the repair involving the aortic arch, descending thoracic, and proximal   abdominal aorta. Thoracic aorta measures up to 5.7 cm at the level of the   distal arch. Celiac artery supplied by the false lumen and is mildly   aneurysmal proximally.    Right common femoral artery pseudoaneurysm.    Nonspecific area of enhancement along the right rectal wall, for which   nonemergent follow-up direct visualization may be obtained for further   characterization.      < end of copied text >

## 2023-11-29 NOTE — PROGRESS NOTE ADULT - ASSESSMENT
Chantal Guadalupe is an 83 y.o. M PMHx significant for epilepsy, HTN, BPH, hypothyroidism, who presents to the ED s/p fall to pavement .  Patient had direct impact to left side of face presents with severe ecchymosis of the left eye which is closed shut at this time.  Son who accompanies patient and translates as patient is non-English speaking, states patient was walking with a umbrella 5 steps in front of him.  Umbrella slipped causing him to fall to the floor.  Patient's son states prior to fall patient was not lightheaded or dizzy. He denies LOC after fall.  Patient did not appear to be confused after fall.  Additionally, c.o. pain to the L. knee which has a superficial knee abrasion. noticed with abn creatinine.      1- CKD III based on previous blood work in the system when reviewed  2- HTN   3- syncope     cr slightly higher   trend hb   acidosis trend bicarb in am is still low to start sodium bicarbonate 650 mg po tid   trend hb given anemia   check cpk

## 2023-11-29 NOTE — PROGRESS NOTE ADULT - SUBJECTIVE AND OBJECTIVE BOX
[facility]   [phone]  RENAL FOLLOW UP NOTE  --------------------------------------------------------------------------------  Chief Complaint:    24 hour events/subjective:    seen earlier   limited english but understands and answers questions       PMH:  --------------------------------------------------------------------------------  No significant changes to PMH, PSH, FHx, SHx, unless otherwise noted    ALLERGIES & MEDICATIONS  --------------------------------------------------------------------------------  Allergies    No Known Allergies    Intolerances      Standing Inpatient Medications  artificial tears (preservative free) Ophthalmic Solution 1 Drop(s) Both EYES four times a day  dextrose 50% Injectable 25 Gram(s) IV Push once  dextrose 50% Injectable 12.5 Gram(s) IV Push once  glucagon  Injectable 1 milliGRAM(s) IntraMuscular once  influenza  Vaccine (HIGH DOSE) 0.7 milliLiter(s) IntraMuscular once  insulin lispro (ADMELOG) corrective regimen sliding scale   SubCutaneous three times a day before meals  insulin lispro (ADMELOG) corrective regimen sliding scale   SubCutaneous at bedtime  pantoprazole    Tablet 40 milliGRAM(s) Oral before breakfast  phenytoin   Suspension 100 milliGRAM(s) Oral at bedtime  primidone 250 milliGRAM(s) Oral at bedtime  sodium chloride 0.9% lock flush 3 milliLiter(s) IV Push every 8 hours  sodium chloride 0.9%. 1000 milliLiter(s) IV Continuous <Continuous>    PRN Inpatient Medications  dextrose Oral Gel 15 Gram(s) Oral once PRN  dextrose Oral Gel 15 Gram(s) Oral once PRN      REVIEW OF SYSTEMS  --------------------------------------------------------------------------------    Gen: denies  fevers/  CVS: denies chest pain/  Resp: denies SOB/Cough  GI: Denies N/V/Abd pain  : states urinating     VITALS/PHYSICAL EXAM  --------------------------------------------------------------------------------  T(C): 36.9 (11-29-23 @ 20:21), Max: 36.9 (11-29-23 @ 20:21)  HR: 98 (11-29-23 @ 20:21) (71 - 112)  BP: 140/74 (11-29-23 @ 20:21) (114/64 - 140/74)  RR: 18 (11-29-23 @ 20:21) (18 - 18)  SpO2: 98% (11-29-23 @ 20:21) (98% - 100%)  Wt(kg): --        11-28-23 @ 07:01  -  11-29-23 @ 07:00  --------------------------------------------------------  IN: 665 mL / OUT: 850 mL / NET: -185 mL    11-29-23 @ 07:01  -  11-29-23 @ 22:45  --------------------------------------------------------  IN: 318 mL / OUT: 400 mL / NET: -82 mL      Physical Exam:  	    	Gen: Non toxic comfortable appearing  facial ecchymosis and periorbital as well   	no jvd   	Pulm: decrease bs  no rales or ronchi or wheezing  	CV: RRR, S1S2; no rub  	Back: No CVA tenderness;  	Abd: +BS, soft, nontender/nondistended  	: No suprapubic tenderness  	UE: Warm, no cyanosis  no clubbing,  no edema  	LE: Warm, no cyanosis  no clubbing, no edema  	Neuro: alert and oriented. speech coherent     LABS/STUDIES  --------------------------------------------------------------------------------              9.6    6.33  >-----------<  110      [11-29-23 @ 12:11]              29.1     140  |  109  |  58  ----------------------------<  154      [11-29-23 @ 12:11]  5.1   |  16  |  2.18        Ca     8.5     [11-29-23 @ 12:11]      Mg     2.6     [11-29-23 @ 12:11]      Phos  3.8     [11-28-23 @ 11:09]    TPro  6.5  /  Alb  3.8  /  TBili  0.2  /  DBili  x   /  AST  30  /  ALT  20  /  AlkPhos  74  [11-29-23 @ 12:11]    PT/INR: PT 12.1 , INR 1.10       [11-28-23 @ 03:24]  PTT: 31.6       [11-28-23 @ 03:24]      Creatinine Trend:  SCr 2.18 [11-29 @ 12:11]  SCr 1.75 [11-28 @ 11:09]  SCr 1.79 [11-28 @ 03:04]  SCr 1.98 [11-27 @ 22:08]

## 2023-11-29 NOTE — PROGRESS NOTE ADULT - ASSESSMENT
83 y.o. M PMHx significant for epilepsy, HTN, BPH, hypothyroidism, type a aortic dissection s/p repair in 2019 who presents to the ED s/p fall, admitted to CTU for monitoring s/p labetalol/cardene gtt. Medicine consulted for BP management and tx to medicine service.   Patient is feeling well without any complaints. Currently normotensive without any BP agents. 2d echo normal. Recommend primary team to continue to do dc planning, PT pending. Would not add any BP agents at this time as SBP remains 100-120s, if trends up add back home meds.    - incidental nonspecific rectal wall enhancement on CT- recommend opt GI referral if within patient's GOC.     anemia  - iron studies    diabetes  - fs qid  - hgb a1c  - insulin ss

## 2023-11-30 LAB
A1C WITH ESTIMATED AVERAGE GLUCOSE RESULT: 5.4 % — SIGNIFICANT CHANGE UP (ref 4–5.6)
A1C WITH ESTIMATED AVERAGE GLUCOSE RESULT: 5.4 % — SIGNIFICANT CHANGE UP (ref 4–5.6)
ALBUMIN SERPL ELPH-MCNC: 3.7 G/DL — SIGNIFICANT CHANGE UP (ref 3.3–5)
ALBUMIN SERPL ELPH-MCNC: 3.7 G/DL — SIGNIFICANT CHANGE UP (ref 3.3–5)
ALP SERPL-CCNC: 72 U/L — SIGNIFICANT CHANGE UP (ref 40–120)
ALP SERPL-CCNC: 72 U/L — SIGNIFICANT CHANGE UP (ref 40–120)
ALT FLD-CCNC: 19 U/L — SIGNIFICANT CHANGE UP (ref 10–45)
ALT FLD-CCNC: 19 U/L — SIGNIFICANT CHANGE UP (ref 10–45)
ANION GAP SERPL CALC-SCNC: 14 MMOL/L — SIGNIFICANT CHANGE UP (ref 5–17)
ANION GAP SERPL CALC-SCNC: 14 MMOL/L — SIGNIFICANT CHANGE UP (ref 5–17)
AST SERPL-CCNC: 32 U/L — SIGNIFICANT CHANGE UP (ref 10–40)
AST SERPL-CCNC: 32 U/L — SIGNIFICANT CHANGE UP (ref 10–40)
BILIRUB SERPL-MCNC: 0.2 MG/DL — SIGNIFICANT CHANGE UP (ref 0.2–1.2)
BILIRUB SERPL-MCNC: 0.2 MG/DL — SIGNIFICANT CHANGE UP (ref 0.2–1.2)
BUN SERPL-MCNC: 59 MG/DL — HIGH (ref 7–23)
BUN SERPL-MCNC: 59 MG/DL — HIGH (ref 7–23)
CALCIUM SERPL-MCNC: 8 MG/DL — LOW (ref 8.4–10.5)
CALCIUM SERPL-MCNC: 8 MG/DL — LOW (ref 8.4–10.5)
CHLORIDE SERPL-SCNC: 113 MMOL/L — HIGH (ref 96–108)
CHLORIDE SERPL-SCNC: 113 MMOL/L — HIGH (ref 96–108)
CK SERPL-CCNC: 648 U/L — HIGH (ref 30–200)
CK SERPL-CCNC: 648 U/L — HIGH (ref 30–200)
CO2 SERPL-SCNC: 14 MMOL/L — LOW (ref 22–31)
CO2 SERPL-SCNC: 14 MMOL/L — LOW (ref 22–31)
CREAT SERPL-MCNC: 2.5 MG/DL — HIGH (ref 0.5–1.3)
CREAT SERPL-MCNC: 2.5 MG/DL — HIGH (ref 0.5–1.3)
EGFR: 25 ML/MIN/1.73M2 — LOW
EGFR: 25 ML/MIN/1.73M2 — LOW
ESTIMATED AVERAGE GLUCOSE: 108 MG/DL — SIGNIFICANT CHANGE UP (ref 68–114)
ESTIMATED AVERAGE GLUCOSE: 108 MG/DL — SIGNIFICANT CHANGE UP (ref 68–114)
FERRITIN SERPL-MCNC: 202 NG/ML — SIGNIFICANT CHANGE UP (ref 30–400)
FERRITIN SERPL-MCNC: 202 NG/ML — SIGNIFICANT CHANGE UP (ref 30–400)
FOLATE SERPL-MCNC: 11.7 NG/ML — SIGNIFICANT CHANGE UP
FOLATE SERPL-MCNC: 11.7 NG/ML — SIGNIFICANT CHANGE UP
GLUCOSE BLDC GLUCOMTR-MCNC: 107 MG/DL — HIGH (ref 70–99)
GLUCOSE BLDC GLUCOMTR-MCNC: 107 MG/DL — HIGH (ref 70–99)
GLUCOSE BLDC GLUCOMTR-MCNC: 116 MG/DL — HIGH (ref 70–99)
GLUCOSE BLDC GLUCOMTR-MCNC: 116 MG/DL — HIGH (ref 70–99)
GLUCOSE BLDC GLUCOMTR-MCNC: 121 MG/DL — HIGH (ref 70–99)
GLUCOSE SERPL-MCNC: 100 MG/DL — HIGH (ref 70–99)
GLUCOSE SERPL-MCNC: 100 MG/DL — HIGH (ref 70–99)
HCT VFR BLD CALC: 27 % — LOW (ref 39–50)
HCT VFR BLD CALC: 27 % — LOW (ref 39–50)
HGB BLD-MCNC: 8.7 G/DL — LOW (ref 13–17)
HGB BLD-MCNC: 8.7 G/DL — LOW (ref 13–17)
IRON SATN MFR SERPL: 27 % — SIGNIFICANT CHANGE UP (ref 16–55)
IRON SATN MFR SERPL: 27 % — SIGNIFICANT CHANGE UP (ref 16–55)
IRON SATN MFR SERPL: 61 UG/DL — SIGNIFICANT CHANGE UP (ref 45–165)
IRON SATN MFR SERPL: 61 UG/DL — SIGNIFICANT CHANGE UP (ref 45–165)
MAGNESIUM SERPL-MCNC: 2.4 MG/DL — SIGNIFICANT CHANGE UP (ref 1.6–2.6)
MAGNESIUM SERPL-MCNC: 2.4 MG/DL — SIGNIFICANT CHANGE UP (ref 1.6–2.6)
MCHC RBC-ENTMCNC: 32.2 GM/DL — SIGNIFICANT CHANGE UP (ref 32–36)
MCHC RBC-ENTMCNC: 32.2 GM/DL — SIGNIFICANT CHANGE UP (ref 32–36)
MCHC RBC-ENTMCNC: 32.2 PG — SIGNIFICANT CHANGE UP (ref 27–34)
MCHC RBC-ENTMCNC: 32.2 PG — SIGNIFICANT CHANGE UP (ref 27–34)
MCV RBC AUTO: 100 FL — SIGNIFICANT CHANGE UP (ref 80–100)
MCV RBC AUTO: 100 FL — SIGNIFICANT CHANGE UP (ref 80–100)
NRBC # BLD: 0 /100 WBCS — SIGNIFICANT CHANGE UP (ref 0–0)
NRBC # BLD: 0 /100 WBCS — SIGNIFICANT CHANGE UP (ref 0–0)
PHOSPHATE SERPL-MCNC: 3.7 MG/DL — SIGNIFICANT CHANGE UP (ref 2.5–4.5)
PHOSPHATE SERPL-MCNC: 3.7 MG/DL — SIGNIFICANT CHANGE UP (ref 2.5–4.5)
PLATELET # BLD AUTO: 102 K/UL — LOW (ref 150–400)
PLATELET # BLD AUTO: 102 K/UL — LOW (ref 150–400)
POTASSIUM SERPL-MCNC: 4.3 MMOL/L — SIGNIFICANT CHANGE UP (ref 3.5–5.3)
POTASSIUM SERPL-MCNC: 4.3 MMOL/L — SIGNIFICANT CHANGE UP (ref 3.5–5.3)
POTASSIUM SERPL-SCNC: 4.3 MMOL/L — SIGNIFICANT CHANGE UP (ref 3.5–5.3)
POTASSIUM SERPL-SCNC: 4.3 MMOL/L — SIGNIFICANT CHANGE UP (ref 3.5–5.3)
PROT SERPL-MCNC: 6.3 G/DL — SIGNIFICANT CHANGE UP (ref 6–8.3)
PROT SERPL-MCNC: 6.3 G/DL — SIGNIFICANT CHANGE UP (ref 6–8.3)
RBC # BLD: 2.7 M/UL — LOW (ref 4.2–5.8)
RBC # BLD: 2.7 M/UL — LOW (ref 4.2–5.8)
RBC # FLD: 14.1 % — SIGNIFICANT CHANGE UP (ref 10.3–14.5)
RBC # FLD: 14.1 % — SIGNIFICANT CHANGE UP (ref 10.3–14.5)
SODIUM SERPL-SCNC: 141 MMOL/L — SIGNIFICANT CHANGE UP (ref 135–145)
SODIUM SERPL-SCNC: 141 MMOL/L — SIGNIFICANT CHANGE UP (ref 135–145)
TIBC SERPL-MCNC: 224 UG/DL — SIGNIFICANT CHANGE UP (ref 220–430)
TIBC SERPL-MCNC: 224 UG/DL — SIGNIFICANT CHANGE UP (ref 220–430)
TSH SERPL-MCNC: 5.3 UIU/ML — HIGH (ref 0.27–4.2)
TSH SERPL-MCNC: 5.3 UIU/ML — HIGH (ref 0.27–4.2)
UIBC SERPL-MCNC: 163 UG/DL — SIGNIFICANT CHANGE UP (ref 110–370)
UIBC SERPL-MCNC: 163 UG/DL — SIGNIFICANT CHANGE UP (ref 110–370)
VIT B12 SERPL-MCNC: 370 PG/ML — SIGNIFICANT CHANGE UP (ref 232–1245)
VIT B12 SERPL-MCNC: 370 PG/ML — SIGNIFICANT CHANGE UP (ref 232–1245)
WBC # BLD: 6.06 K/UL — SIGNIFICANT CHANGE UP (ref 3.8–10.5)
WBC # BLD: 6.06 K/UL — SIGNIFICANT CHANGE UP (ref 3.8–10.5)
WBC # FLD AUTO: 6.06 K/UL — SIGNIFICANT CHANGE UP (ref 3.8–10.5)
WBC # FLD AUTO: 6.06 K/UL — SIGNIFICANT CHANGE UP (ref 3.8–10.5)

## 2023-11-30 PROCEDURE — 74176 CT ABD & PELVIS W/O CONTRAST: CPT | Mod: 26

## 2023-11-30 RX ORDER — SODIUM BICARBONATE 1 MEQ/ML
650 SYRINGE (ML) INTRAVENOUS THREE TIMES A DAY
Refills: 0 | Status: DISCONTINUED | OUTPATIENT
Start: 2023-11-30 | End: 2023-12-05

## 2023-11-30 RX ADMIN — Medication 1 DROP(S): at 17:45

## 2023-11-30 RX ADMIN — Medication 650 MILLIGRAM(S): at 13:45

## 2023-11-30 RX ADMIN — PRIMIDONE 250 MILLIGRAM(S): 250 TABLET ORAL at 21:54

## 2023-11-30 RX ADMIN — SODIUM CHLORIDE 3 MILLILITER(S): 9 INJECTION INTRAMUSCULAR; INTRAVENOUS; SUBCUTANEOUS at 12:27

## 2023-11-30 RX ADMIN — SODIUM CHLORIDE 3 MILLILITER(S): 9 INJECTION INTRAMUSCULAR; INTRAVENOUS; SUBCUTANEOUS at 21:16

## 2023-11-30 RX ADMIN — Medication 1 DROP(S): at 23:05

## 2023-11-30 RX ADMIN — Medication 1 DROP(S): at 11:57

## 2023-11-30 RX ADMIN — Medication 650 MILLIGRAM(S): at 21:55

## 2023-11-30 RX ADMIN — Medication 100 MILLIGRAM(S): at 21:54

## 2023-11-30 RX ADMIN — SODIUM CHLORIDE 3 MILLILITER(S): 9 INJECTION INTRAMUSCULAR; INTRAVENOUS; SUBCUTANEOUS at 06:26

## 2023-11-30 RX ADMIN — PANTOPRAZOLE SODIUM 40 MILLIGRAM(S): 20 TABLET, DELAYED RELEASE ORAL at 06:05

## 2023-11-30 RX ADMIN — SODIUM CHLORIDE 50 MILLILITER(S): 9 INJECTION INTRAMUSCULAR; INTRAVENOUS; SUBCUTANEOUS at 21:55

## 2023-11-30 RX ADMIN — Medication 1 DROP(S): at 06:04

## 2023-11-30 NOTE — PROGRESS NOTE ADULT - ASSESSMENT
83 y.o. M PMHx significant for epilepsy, HTN, BPH, hypothyroidism, type a aortic dissection s/p repair in 2019 who presents to the ED s/p fall, admitted to CTU for monitoring s/p labetalol/cardene gtt. Medicine consulted for BP management and tx to medicine service.   Patient is feeling well without any complaints. Currently normotensive without any BP agents. 2d echo normal.  - Would not add any BP agents at this time as SBP remains 100-120s, if trends up add back home meds.     incidental nonspecific rectal wall enhancement on CT  - recommend opt GI referral if within patient's GOC.     anemia  - iron studies c/w ACD  - check ct to r/o rp bleed    diabetes  - fs qid  - hgb a1c 5.4  - insulin ss       83 y.o. M PMHx significant for epilepsy, HTN, BPH, hypothyroidism, type a aortic dissection s/p repair in 2019 who presents to the ED s/p fall, admitted to CTU for monitoring s/p labetalol/cardene gtt. Medicine consulted for BP management and tx to medicine service.   Patient is feeling well without any complaints. Currently normotensive without any BP agents. 2d echo normal.  - Would not add any BP agents at this time as SBP remains 100-120s, if trends up add back home meds.    CKD  - nephrology following  - avoid nephrotoxins     incidental nonspecific rectal wall enhancement on CT  - recommend opt GI referral if within patient's GOC.     anemia  - iron studies c/w ACD  - check ct to r/o rp bleed    diabetes  - fs qid  - hgb a1c 5.4  - insulin ss

## 2023-12-01 LAB
ANION GAP SERPL CALC-SCNC: 13 MMOL/L — SIGNIFICANT CHANGE UP (ref 5–17)
ANION GAP SERPL CALC-SCNC: 13 MMOL/L — SIGNIFICANT CHANGE UP (ref 5–17)
BUN SERPL-MCNC: 42 MG/DL — HIGH (ref 7–23)
BUN SERPL-MCNC: 42 MG/DL — HIGH (ref 7–23)
CALCIUM SERPL-MCNC: 8.3 MG/DL — LOW (ref 8.4–10.5)
CALCIUM SERPL-MCNC: 8.3 MG/DL — LOW (ref 8.4–10.5)
CHLORIDE SERPL-SCNC: 115 MMOL/L — HIGH (ref 96–108)
CHLORIDE SERPL-SCNC: 115 MMOL/L — HIGH (ref 96–108)
CO2 SERPL-SCNC: 15 MMOL/L — LOW (ref 22–31)
CO2 SERPL-SCNC: 15 MMOL/L — LOW (ref 22–31)
CREAT SERPL-MCNC: 1.89 MG/DL — HIGH (ref 0.5–1.3)
CREAT SERPL-MCNC: 1.89 MG/DL — HIGH (ref 0.5–1.3)
EGFR: 35 ML/MIN/1.73M2 — LOW
EGFR: 35 ML/MIN/1.73M2 — LOW
GLUCOSE BLDC GLUCOMTR-MCNC: 100 MG/DL — HIGH (ref 70–99)
GLUCOSE BLDC GLUCOMTR-MCNC: 100 MG/DL — HIGH (ref 70–99)
GLUCOSE BLDC GLUCOMTR-MCNC: 104 MG/DL — HIGH (ref 70–99)
GLUCOSE BLDC GLUCOMTR-MCNC: 104 MG/DL — HIGH (ref 70–99)
GLUCOSE BLDC GLUCOMTR-MCNC: 136 MG/DL — HIGH (ref 70–99)
GLUCOSE BLDC GLUCOMTR-MCNC: 136 MG/DL — HIGH (ref 70–99)
GLUCOSE BLDC GLUCOMTR-MCNC: 99 MG/DL — SIGNIFICANT CHANGE UP (ref 70–99)
GLUCOSE BLDC GLUCOMTR-MCNC: 99 MG/DL — SIGNIFICANT CHANGE UP (ref 70–99)
GLUCOSE SERPL-MCNC: 92 MG/DL — SIGNIFICANT CHANGE UP (ref 70–99)
GLUCOSE SERPL-MCNC: 92 MG/DL — SIGNIFICANT CHANGE UP (ref 70–99)
HCT VFR BLD CALC: 26 % — LOW (ref 39–50)
HCT VFR BLD CALC: 26 % — LOW (ref 39–50)
HGB BLD-MCNC: 8.5 G/DL — LOW (ref 13–17)
HGB BLD-MCNC: 8.5 G/DL — LOW (ref 13–17)
MCHC RBC-ENTMCNC: 32.7 GM/DL — SIGNIFICANT CHANGE UP (ref 32–36)
MCHC RBC-ENTMCNC: 32.7 GM/DL — SIGNIFICANT CHANGE UP (ref 32–36)
MCHC RBC-ENTMCNC: 32.7 PG — SIGNIFICANT CHANGE UP (ref 27–34)
MCHC RBC-ENTMCNC: 32.7 PG — SIGNIFICANT CHANGE UP (ref 27–34)
MCV RBC AUTO: 100 FL — SIGNIFICANT CHANGE UP (ref 80–100)
MCV RBC AUTO: 100 FL — SIGNIFICANT CHANGE UP (ref 80–100)
NRBC # BLD: 0 /100 WBCS — SIGNIFICANT CHANGE UP (ref 0–0)
NRBC # BLD: 0 /100 WBCS — SIGNIFICANT CHANGE UP (ref 0–0)
PLATELET # BLD AUTO: 102 K/UL — LOW (ref 150–400)
PLATELET # BLD AUTO: 102 K/UL — LOW (ref 150–400)
POTASSIUM SERPL-MCNC: 4.3 MMOL/L — SIGNIFICANT CHANGE UP (ref 3.5–5.3)
POTASSIUM SERPL-MCNC: 4.3 MMOL/L — SIGNIFICANT CHANGE UP (ref 3.5–5.3)
POTASSIUM SERPL-SCNC: 4.3 MMOL/L — SIGNIFICANT CHANGE UP (ref 3.5–5.3)
POTASSIUM SERPL-SCNC: 4.3 MMOL/L — SIGNIFICANT CHANGE UP (ref 3.5–5.3)
RBC # BLD: 2.6 M/UL — LOW (ref 4.2–5.8)
RBC # BLD: 2.6 M/UL — LOW (ref 4.2–5.8)
RBC # FLD: 14 % — SIGNIFICANT CHANGE UP (ref 10.3–14.5)
RBC # FLD: 14 % — SIGNIFICANT CHANGE UP (ref 10.3–14.5)
SODIUM SERPL-SCNC: 143 MMOL/L — SIGNIFICANT CHANGE UP (ref 135–145)
SODIUM SERPL-SCNC: 143 MMOL/L — SIGNIFICANT CHANGE UP (ref 135–145)
WBC # BLD: 5.02 K/UL — SIGNIFICANT CHANGE UP (ref 3.8–10.5)
WBC # BLD: 5.02 K/UL — SIGNIFICANT CHANGE UP (ref 3.8–10.5)
WBC # FLD AUTO: 5.02 K/UL — SIGNIFICANT CHANGE UP (ref 3.8–10.5)
WBC # FLD AUTO: 5.02 K/UL — SIGNIFICANT CHANGE UP (ref 3.8–10.5)

## 2023-12-01 RX ADMIN — Medication 650 MILLIGRAM(S): at 13:21

## 2023-12-01 RX ADMIN — PANTOPRAZOLE SODIUM 40 MILLIGRAM(S): 20 TABLET, DELAYED RELEASE ORAL at 05:18

## 2023-12-01 RX ADMIN — Medication 650 MILLIGRAM(S): at 05:18

## 2023-12-01 RX ADMIN — Medication 1 DROP(S): at 11:39

## 2023-12-01 RX ADMIN — Medication 1 DROP(S): at 05:17

## 2023-12-01 RX ADMIN — Medication 1 DROP(S): at 23:57

## 2023-12-01 RX ADMIN — Medication 100 MILLIGRAM(S): at 21:28

## 2023-12-01 RX ADMIN — SODIUM CHLORIDE 3 MILLILITER(S): 9 INJECTION INTRAMUSCULAR; INTRAVENOUS; SUBCUTANEOUS at 11:48

## 2023-12-01 RX ADMIN — PRIMIDONE 250 MILLIGRAM(S): 250 TABLET ORAL at 21:28

## 2023-12-01 RX ADMIN — SODIUM CHLORIDE 3 MILLILITER(S): 9 INJECTION INTRAMUSCULAR; INTRAVENOUS; SUBCUTANEOUS at 21:23

## 2023-12-01 RX ADMIN — Medication 1 DROP(S): at 17:12

## 2023-12-01 RX ADMIN — SODIUM CHLORIDE 3 MILLILITER(S): 9 INJECTION INTRAMUSCULAR; INTRAVENOUS; SUBCUTANEOUS at 07:37

## 2023-12-01 RX ADMIN — Medication 650 MILLIGRAM(S): at 21:28

## 2023-12-01 NOTE — PROGRESS NOTE ADULT - ASSESSMENT
Collins is an 83 y.o. M PMHx significant for epilepsy, HTN, BPH, hypothyroidism, who presents to the ED s/p fall to pavement .  Patient had direct impact to left side of face presents with severe ecchymosis of the left eye which is closed shut at this time.  Son who accompanies patient and translates as patient is non-English speaking, states patient was walking with a umbrella 5 steps in front of him.  Umbrella slipped causing him to fall to the floor.  Patient's son states prior to fall patient was not lightheaded or dizzy. He denies LOC after fall.  Patient did not appear to be confused after fall.  Additionally, c.o. pain to the L. knee which has a superficial knee abrasion. noticed with abn creatinine.      1- CKD III based on previous blood work in the system when reviewed  2- HTN   3- syncope     cr improving to baseline   trend hb   acidosis  start sodium bicarbonate 650 mg po tid   trend hb given anemia   check cpk

## 2023-12-01 NOTE — PROGRESS NOTE ADULT - SUBJECTIVE AND OBJECTIVE BOX
DATE OF SERVICE: 12-01-23 @ 13:07    Patient is a 83y old  Male who presents with a chief complaint of s/p fall (30 Nov 2023 17:34)      SUBJECTIVE / OVERNIGHT EVENTS:  No chest pain. No shortness of breath. No complaints. No events overnight.     MEDICATIONS  (STANDING):  artificial tears (preservative free) Ophthalmic Solution 1 Drop(s) Both EYES four times a day  dextrose 50% Injectable 25 Gram(s) IV Push once  dextrose 50% Injectable 12.5 Gram(s) IV Push once  glucagon  Injectable 1 milliGRAM(s) IntraMuscular once  influenza  Vaccine (HIGH DOSE) 0.7 milliLiter(s) IntraMuscular once  insulin lispro (ADMELOG) corrective regimen sliding scale   SubCutaneous three times a day before meals  insulin lispro (ADMELOG) corrective regimen sliding scale   SubCutaneous at bedtime  pantoprazole    Tablet 40 milliGRAM(s) Oral before breakfast  phenytoin   Suspension 100 milliGRAM(s) Oral at bedtime  primidone 250 milliGRAM(s) Oral at bedtime  sodium bicarbonate 650 milliGRAM(s) Oral three times a day  sodium chloride 0.9% lock flush 3 milliLiter(s) IV Push every 8 hours  sodium chloride 0.9%. 1000 milliLiter(s) (50 mL/Hr) IV Continuous <Continuous>    MEDICATIONS  (PRN):  dextrose Oral Gel 15 Gram(s) Oral once PRN Blood Glucose LESS THAN 70 milliGRAM(s)/deciliter  dextrose Oral Gel 15 Gram(s) Oral once PRN Blood Glucose LESS THAN 70 milliGRAM(s)/deciliter      Vital Signs Last 24 Hrs  T(C): 37 (01 Dec 2023 12:32), Max: 37.6 (01 Dec 2023 00:52)  T(F): 98.6 (01 Dec 2023 12:32), Max: 99.6 (01 Dec 2023 00:52)  HR: 100 (01 Dec 2023 12:32) (88 - 100)  BP: 143/67 (01 Dec 2023 12:32) (102/71 - 155/71)  BP(mean): --  RR: 18 (01 Dec 2023 12:32) (18 - 18)  SpO2: 98% (01 Dec 2023 12:32) (96% - 98%)    Parameters below as of 01 Dec 2023 05:22  Patient On (Oxygen Delivery Method): room air      CAPILLARY BLOOD GLUCOSE      POCT Blood Glucose.: 99 mg/dL (01 Dec 2023 12:57)  POCT Blood Glucose.: 104 mg/dL (01 Dec 2023 08:52)  POCT Blood Glucose.: 107 mg/dL (30 Nov 2023 21:46)  POCT Blood Glucose.: 116 mg/dL (30 Nov 2023 17:16)    I&O's Summary    30 Nov 2023 07:01  -  01 Dec 2023 07:00  --------------------------------------------------------  IN: 120 mL / OUT: 1960 mL / NET: -1840 mL        PHYSICAL EXAM:  GENERAL: NAD, well-developed  HEAD:  Atraumatic, Normocephalic  EYES: EOMI, PERRLA, conjunctiva and sclera clear  NECK: Supple, No JVD  CHEST/LUNG: Clear to auscultation bilaterally; No wheeze  HEART: Regular rate and rhythm; No murmurs, rubs, or gallops  ABDOMEN: Soft, Nontender, Nondistended; Bowel sounds present  EXTREMITIES:  2+ Peripheral Pulses, No clubbing, cyanosis, or edema  PSYCH: AAOx3  NEUROLOGY: non-focal  SKIN: No rashes or lesions    LABS:                        8.5    5.02  )-----------( 102      ( 01 Dec 2023 06:47 )             26.0     12-01    143  |  115<H>  |  42<H>  ----------------------------<  92  4.3   |  15<L>  |  1.89<H>    Ca    8.3<L>      01 Dec 2023 06:47  Phos  3.7     11-30  Mg     2.4     11-30    TPro  6.3  /  Alb  3.7  /  TBili  0.2  /  DBili  x   /  AST  32  /  ALT  19  /  AlkPhos  72  11-30      CARDIAC MARKERS ( 30 Nov 2023 06:30 )  x     / x     / 648 U/L / x     / x          Urinalysis Basic - ( 01 Dec 2023 06:47 )    Color: x / Appearance: x / SG: x / pH: x  Gluc: 92 mg/dL / Ketone: x  / Bili: x / Urobili: x   Blood: x / Protein: x / Nitrite: x   Leuk Esterase: x / RBC: x / WBC x   Sq Epi: x / Non Sq Epi: x / Bacteria: x    < from: CT Abdomen and Pelvis No Cont (11.30.23 @ 21:31) >  INTERPRETATION:  CLINICAL INFORMATION: Fall. History of aortic   dissection. Evaluate for retroperitoneal bleed.    COMPARISON: CTA chest abdomen pelvis 11/27/2023. CT abdomen pelvis 11 2013.    CONTRAST/COMPLICATIONS:  IV Contrast: NONE  Oral Contrast: NONE  Complications: None reported at time of study completion    PROCEDURE:  CT of the Abdomen and Pelvis was performed.  Sagittal and coronal reformats were performed.    FINDINGS:  Evaluation of the solid organs and vasculature is limited without   intravenous contrast.    LOWER CHEST: Median sternotomy.    LIVER: Within normal limits.  BILE DUCTS: Normal caliber.  GALLBLADDER: Vicarious excretion of contrast.  SPLEEN: Within normal limits.  PANCREAS: Within normal limits.  ADRENALS: Within normal limits.  KIDNEYS/URETERS: Left renal parapelvic cysts.    BLADDER: Within normal limits.  REPRODUCTIVE ORGANS: Prostate within normal limits.    BOWEL: No bowel obstruction. Appendix is normal.  PERITONEUM: No ascites.  VESSELS: Partially imaged thoracic dissection extending into the   abdominal aorta, better described on prior contrast enhanced CT   11/27/2023.  RETROPERITONEUM/LYMPH NODES: No lymphadenopathy. No acute retroperitoneal   hematoma.  ABDOMINAL WALL: Right groin surgical clips.  BONES: 5 vertebral hemangioma. Lucent lesion in the L2 vertebral body,   unchanged since 2013.    IMPRESSION:  Partially imaged thoracic dissection extending into the abdominal aorta,   better described on prior contrast enhanced CTA 11/27/2023.    No acute retroperitoneal hematoma.    --- End of Report ---      < end of copied text >  < from: TTE W or WO Ultrasound Enhancing Agent (11.28.23 @ 07:17) >  _______________________________________________________________________________________     CONCLUSIONS:      1. Left ventricular systolic function is normal with an ejection fraction of 52 % by Hammond's method of disks.   2. Normal left ventricular diastolic function.   3. Normal right ventricular cavity size and systolic function.   4. Mild aortic regurgitation.   5. No prior echocardiogram is available for comparison.    ________________________________________________________________________________________  FINDINGS:     Left Ventricle:  Left ventricular systolic function is normal with a calculated ejection fraction of 52 % by the Hammond's biplane method of disks. There is normal left ventricular diastolic function.     Right Ventricle:  The right ventricular cavity is normal in size and normal systolic function. Tricuspid annular plane systolic excursion (TAPSE) is 2.1 cm (normal >=1.7 cm).     Left Atrium:  The left atrium is normal with an indexed volume of 29.93 ml/m².     Right Atrium:  The right atrium is normal in size.     Interatrial Septum:  The interatrial septum appears intact.     Aortic Valve:  The aortic valve is tricuspid with normal leaflet excursion. There is fibrocalcific aortic valve sclerosis without stenosis. There is mild aortic regurgitation.     Mitral Valve:  There is normal leaflet mobility of the mitral valve. There is calcification of the mitral valve annulus.     Tricuspid Valve:  Structurally normal tricuspid valve with normal leaflet excursion. There is mild tricuspid regurgitation.     Pulmonic Valve:  Structurally normal pulmonic valve with normal leaflet excursion.     Pericardium:  No pericardial effusion seen.    < end of copied text >      RADIOLOGY & ADDITIONAL TESTS:    Imaging Personally Reviewed:    Consultant(s) Notes Reviewed:      Care Discussed with Consultants/Other Providers:

## 2023-12-01 NOTE — PROGRESS NOTE ADULT - ASSESSMENT
83 y.o. M PMHx significant for epilepsy, HTN, BPH, hypothyroidism, type a aortic dissection s/p repair in 2019 who presents to the ED s/p fall, admitted to CTU for monitoring s/p labetalol/cardene gtt. Medicine consulted for BP management and tx to medicine service.   Patient is feeling well without any complaints. Currently normotensive without any BP agents. 2d echo normal.  - Would not add any BP agents at this time as SBP remains 100-120s, if trends up add back home meds.    CKD  - nephrology following  - avoid nephrotoxins  - cont bicarb  - d/c iv f     incidental nonspecific rectal wall enhancement on CT  - recommend opt GI referral if within patient's GOC.     anemia  - iron studies c/w ACD  -  ct neg for rp bleed    diabetes  - fs qid  - hgb a1c 5.4  - insulin ss    dispo - PT eval  - Home PT

## 2023-12-01 NOTE — PROGRESS NOTE ADULT - SUBJECTIVE AND OBJECTIVE BOX
[facility]   [phone]  RENAL FOLLOW UP NOTE  --------------------------------------------------------------------------------  Chief Complaint:    24 hour events/subjective:    seen earlier   limited english but able to communicate   denies sob     PAST HISTORY  --------------------------------------------------------------------------------  No significant changes to PMH, PSH, FHx, SHx, unless otherwise noted    ALLERGIES & MEDICATIONS  --------------------------------------------------------------------------------  Allergies    No Known Allergies    Intolerances      Standing Inpatient Medications  artificial tears (preservative free) Ophthalmic Solution 1 Drop(s) Both EYES four times a day  dextrose 50% Injectable 12.5 Gram(s) IV Push once  dextrose 50% Injectable 25 Gram(s) IV Push once  glucagon  Injectable 1 milliGRAM(s) IntraMuscular once  influenza  Vaccine (HIGH DOSE) 0.7 milliLiter(s) IntraMuscular once  insulin lispro (ADMELOG) corrective regimen sliding scale   SubCutaneous three times a day before meals  insulin lispro (ADMELOG) corrective regimen sliding scale   SubCutaneous at bedtime  pantoprazole    Tablet 40 milliGRAM(s) Oral before breakfast  phenytoin   Suspension 100 milliGRAM(s) Oral at bedtime  primidone 250 milliGRAM(s) Oral at bedtime  sodium bicarbonate 650 milliGRAM(s) Oral three times a day  sodium chloride 0.9% lock flush 3 milliLiter(s) IV Push every 8 hours    PRN Inpatient Medications  dextrose Oral Gel 15 Gram(s) Oral once PRN  dextrose Oral Gel 15 Gram(s) Oral once PRN      REVIEW OF SYSTEMS  --------------------------------------------------------------------------------    Gen: denies  fevers/chills,  CVS: denies chest pain/palpitations  Resp: denies SOB/Cough  GI: Denies N/V/Abd pain  : Denies dysuria    VITALS/PHYSICAL EXAM  --------------------------------------------------------------------------------  T(C): 36.9 (12-01-23 @ 20:41), Max: 37.6 (12-01-23 @ 00:52)  HR: 98 (12-01-23 @ 20:41) (88 - 100)  BP: 145/77 (12-01-23 @ 20:41) (102/71 - 145/77)  RR: 18 (12-01-23 @ 20:41) (18 - 18)  SpO2: 97% (12-01-23 @ 20:41) (96% - 98%)  Wt(kg): --        11-30-23 @ 07:01  -  12-01-23 @ 07:00  --------------------------------------------------------  IN: 120 mL / OUT: 1960 mL / NET: -1840 mL    12-01-23 @ 07:01  -  12-01-23 @ 22:37  --------------------------------------------------------  IN: 0 mL / OUT: 525 mL / NET: -525 mL      Physical Exam:  	    	    	Gen: Non toxic comfortable appearing  facial ecchymosis and periorbital as well   	no jvd   	Pulm: decrease bs  no rales or ronchi or wheezing  	CV: RRR, S1S2; no rub  	Back: No CVA tenderness;  	Abd: +BS, soft, nontender/nondistended  	: No suprapubic tenderness  	UE: Warm, no cyanosis  no clubbing,  no edema  	LE: Warm, no cyanosis  no clubbing, no edema  	Neuro: alert and oriented. speech coherent       LABS/STUDIES  --------------------------------------------------------------------------------              8.5    5.02  >-----------<  102      [12-01-23 @ 06:47]              26.0     143  |  115  |  42  ----------------------------<  92      [12-01-23 @ 06:47]  4.3   |  15  |  1.89        Ca     8.3     [12-01-23 @ 06:47]      Mg     2.4     [11-30-23 @ 06:30]      Phos  3.7     [11-30-23 @ 06:30]    TPro  6.3  /  Alb  3.7  /  TBili  0.2  /  DBili  x   /  AST  32  /  ALT  19  /  AlkPhos  72  [11-30-23 @ 06:30]              [11-30-23 @ 06:30]    Creatinine Trend:  SCr 1.89 [12-01 @ 06:47]  SCr 2.50 [11-30 @ 06:30]  SCr 2.18 [11-29 @ 12:11]  SCr 1.75 [11-28 @ 11:09]  SCr 1.79 [11-28 @ 03:04]        Iron 61, TIBC 224, %sat 27      [11-30-23 @ 06:31]  Ferritin 202      [11-30-23 @ 06:31]  TSH 5.30      [11-30-23 @ 06:31]

## 2023-12-02 LAB
ANION GAP SERPL CALC-SCNC: 11 MMOL/L — SIGNIFICANT CHANGE UP (ref 5–17)
ANION GAP SERPL CALC-SCNC: 11 MMOL/L — SIGNIFICANT CHANGE UP (ref 5–17)
BUN SERPL-MCNC: 41 MG/DL — HIGH (ref 7–23)
BUN SERPL-MCNC: 41 MG/DL — HIGH (ref 7–23)
CALCIUM SERPL-MCNC: 7.9 MG/DL — LOW (ref 8.4–10.5)
CALCIUM SERPL-MCNC: 7.9 MG/DL — LOW (ref 8.4–10.5)
CHLORIDE SERPL-SCNC: 112 MMOL/L — HIGH (ref 96–108)
CHLORIDE SERPL-SCNC: 112 MMOL/L — HIGH (ref 96–108)
CO2 SERPL-SCNC: 16 MMOL/L — LOW (ref 22–31)
CO2 SERPL-SCNC: 16 MMOL/L — LOW (ref 22–31)
CREAT SERPL-MCNC: 2.25 MG/DL — HIGH (ref 0.5–1.3)
CREAT SERPL-MCNC: 2.25 MG/DL — HIGH (ref 0.5–1.3)
EGFR: 28 ML/MIN/1.73M2 — LOW
EGFR: 28 ML/MIN/1.73M2 — LOW
GLUCOSE BLDC GLUCOMTR-MCNC: 115 MG/DL — HIGH (ref 70–99)
GLUCOSE BLDC GLUCOMTR-MCNC: 115 MG/DL — HIGH (ref 70–99)
GLUCOSE BLDC GLUCOMTR-MCNC: 127 MG/DL — HIGH (ref 70–99)
GLUCOSE BLDC GLUCOMTR-MCNC: 127 MG/DL — HIGH (ref 70–99)
GLUCOSE BLDC GLUCOMTR-MCNC: 129 MG/DL — HIGH (ref 70–99)
GLUCOSE BLDC GLUCOMTR-MCNC: 129 MG/DL — HIGH (ref 70–99)
GLUCOSE BLDC GLUCOMTR-MCNC: 91 MG/DL — SIGNIFICANT CHANGE UP (ref 70–99)
GLUCOSE BLDC GLUCOMTR-MCNC: 91 MG/DL — SIGNIFICANT CHANGE UP (ref 70–99)
GLUCOSE SERPL-MCNC: 94 MG/DL — SIGNIFICANT CHANGE UP (ref 70–99)
GLUCOSE SERPL-MCNC: 94 MG/DL — SIGNIFICANT CHANGE UP (ref 70–99)
HCT VFR BLD CALC: 25 % — LOW (ref 39–50)
HCT VFR BLD CALC: 25 % — LOW (ref 39–50)
HGB BLD-MCNC: 8 G/DL — LOW (ref 13–17)
HGB BLD-MCNC: 8 G/DL — LOW (ref 13–17)
LACTATE SERPL-SCNC: 0.6 MMOL/L — SIGNIFICANT CHANGE UP (ref 0.5–2)
LACTATE SERPL-SCNC: 0.6 MMOL/L — SIGNIFICANT CHANGE UP (ref 0.5–2)
MCHC RBC-ENTMCNC: 32 GM/DL — SIGNIFICANT CHANGE UP (ref 32–36)
MCHC RBC-ENTMCNC: 32 GM/DL — SIGNIFICANT CHANGE UP (ref 32–36)
MCHC RBC-ENTMCNC: 32.1 PG — SIGNIFICANT CHANGE UP (ref 27–34)
MCHC RBC-ENTMCNC: 32.1 PG — SIGNIFICANT CHANGE UP (ref 27–34)
MCV RBC AUTO: 100.4 FL — HIGH (ref 80–100)
MCV RBC AUTO: 100.4 FL — HIGH (ref 80–100)
NRBC # BLD: 0 /100 WBCS — SIGNIFICANT CHANGE UP (ref 0–0)
NRBC # BLD: 0 /100 WBCS — SIGNIFICANT CHANGE UP (ref 0–0)
PLATELET # BLD AUTO: 117 K/UL — LOW (ref 150–400)
PLATELET # BLD AUTO: 117 K/UL — LOW (ref 150–400)
POTASSIUM SERPL-MCNC: 4.7 MMOL/L — SIGNIFICANT CHANGE UP (ref 3.5–5.3)
POTASSIUM SERPL-MCNC: 4.7 MMOL/L — SIGNIFICANT CHANGE UP (ref 3.5–5.3)
POTASSIUM SERPL-SCNC: 4.7 MMOL/L — SIGNIFICANT CHANGE UP (ref 3.5–5.3)
POTASSIUM SERPL-SCNC: 4.7 MMOL/L — SIGNIFICANT CHANGE UP (ref 3.5–5.3)
RBC # BLD: 2.49 M/UL — LOW (ref 4.2–5.8)
RBC # BLD: 2.49 M/UL — LOW (ref 4.2–5.8)
RBC # FLD: 13.8 % — SIGNIFICANT CHANGE UP (ref 10.3–14.5)
RBC # FLD: 13.8 % — SIGNIFICANT CHANGE UP (ref 10.3–14.5)
SODIUM SERPL-SCNC: 139 MMOL/L — SIGNIFICANT CHANGE UP (ref 135–145)
SODIUM SERPL-SCNC: 139 MMOL/L — SIGNIFICANT CHANGE UP (ref 135–145)
WBC # BLD: 5.06 K/UL — SIGNIFICANT CHANGE UP (ref 3.8–10.5)
WBC # BLD: 5.06 K/UL — SIGNIFICANT CHANGE UP (ref 3.8–10.5)
WBC # FLD AUTO: 5.06 K/UL — SIGNIFICANT CHANGE UP (ref 3.8–10.5)
WBC # FLD AUTO: 5.06 K/UL — SIGNIFICANT CHANGE UP (ref 3.8–10.5)

## 2023-12-02 RX ADMIN — Medication 100 MILLIGRAM(S): at 21:44

## 2023-12-02 RX ADMIN — Medication 1 DROP(S): at 05:43

## 2023-12-02 RX ADMIN — PRIMIDONE 250 MILLIGRAM(S): 250 TABLET ORAL at 21:44

## 2023-12-02 RX ADMIN — Medication 650 MILLIGRAM(S): at 21:44

## 2023-12-02 RX ADMIN — Medication 650 MILLIGRAM(S): at 06:05

## 2023-12-02 RX ADMIN — PANTOPRAZOLE SODIUM 40 MILLIGRAM(S): 20 TABLET, DELAYED RELEASE ORAL at 05:43

## 2023-12-02 RX ADMIN — SODIUM CHLORIDE 3 MILLILITER(S): 9 INJECTION INTRAMUSCULAR; INTRAVENOUS; SUBCUTANEOUS at 21:07

## 2023-12-02 RX ADMIN — SODIUM CHLORIDE 3 MILLILITER(S): 9 INJECTION INTRAMUSCULAR; INTRAVENOUS; SUBCUTANEOUS at 12:00

## 2023-12-02 RX ADMIN — Medication 650 MILLIGRAM(S): at 13:35

## 2023-12-02 RX ADMIN — Medication 1 DROP(S): at 13:34

## 2023-12-02 RX ADMIN — Medication 1 DROP(S): at 18:08

## 2023-12-02 NOTE — PROGRESS NOTE ADULT - SUBJECTIVE AND OBJECTIVE BOX
DATE OF SERVICE: 12-02-23 @ 10:21    Patient is a 83y old  Male who presents with a chief complaint of s/p fall (01 Dec 2023 22:36)      SUBJECTIVE / OVERNIGHT EVENTS:  No chest pain. No shortness of breath. No complaints. No events overnight.     MEDICATIONS  (STANDING):  artificial tears (preservative free) Ophthalmic Solution 1 Drop(s) Both EYES four times a day  dextrose 50% Injectable 25 Gram(s) IV Push once  dextrose 50% Injectable 12.5 Gram(s) IV Push once  glucagon  Injectable 1 milliGRAM(s) IntraMuscular once  influenza  Vaccine (HIGH DOSE) 0.7 milliLiter(s) IntraMuscular once  insulin lispro (ADMELOG) corrective regimen sliding scale   SubCutaneous three times a day before meals  insulin lispro (ADMELOG) corrective regimen sliding scale   SubCutaneous at bedtime  pantoprazole    Tablet 40 milliGRAM(s) Oral before breakfast  phenytoin   Suspension 100 milliGRAM(s) Oral at bedtime  primidone 250 milliGRAM(s) Oral at bedtime  sodium bicarbonate 650 milliGRAM(s) Oral three times a day  sodium chloride 0.9% lock flush 3 milliLiter(s) IV Push every 8 hours    MEDICATIONS  (PRN):  dextrose Oral Gel 15 Gram(s) Oral once PRN Blood Glucose LESS THAN 70 milliGRAM(s)/deciliter  dextrose Oral Gel 15 Gram(s) Oral once PRN Blood Glucose LESS THAN 70 milliGRAM(s)/deciliter      Vital Signs Last 24 Hrs  T(C): 36.8 (02 Dec 2023 04:44), Max: 37 (01 Dec 2023 12:32)  T(F): 98.3 (02 Dec 2023 04:44), Max: 98.6 (01 Dec 2023 12:32)  HR: 97 (02 Dec 2023 04:44) (97 - 104)  BP: 151/78 (02 Dec 2023 04:44) (135/68 - 151/78)  BP(mean): --  RR: 18 (02 Dec 2023 04:44) (18 - 18)  SpO2: 96% (02 Dec 2023 04:44) (96% - 98%)    Parameters below as of 02 Dec 2023 04:44  Patient On (Oxygen Delivery Method): room air      CAPILLARY BLOOD GLUCOSE      POCT Blood Glucose.: 115 mg/dL (02 Dec 2023 08:54)  POCT Blood Glucose.: 136 mg/dL (01 Dec 2023 21:28)  POCT Blood Glucose.: 100 mg/dL (01 Dec 2023 16:59)  POCT Blood Glucose.: 99 mg/dL (01 Dec 2023 12:57)    I&O's Summary    01 Dec 2023 07:01  -  02 Dec 2023 07:00  --------------------------------------------------------  IN: 0 mL / OUT: 1225 mL / NET: -1225 mL        PHYSICAL EXAM:  GENERAL: NAD, well-developed  HEAD:  Atraumatic, Normocephalic  EYES: EOMI, PERRLA, conjunctiva and sclera clear  NECK: Supple, No JVD  CHEST/LUNG: Clear to auscultation bilaterally; No wheeze  HEART: Regular rate and rhythm; No murmurs, rubs, or gallops  ABDOMEN: Soft, Nontender, Nondistended; Bowel sounds present  EXTREMITIES:  2+ Peripheral Pulses, No clubbing, cyanosis, or edema  PSYCH: AAOx3  NEUROLOGY: non-focal  SKIN: No rashes or lesions    LABS:                        8.0    5.06  )-----------( 117      ( 02 Dec 2023 07:16 )             25.0     12-02    139  |  112<H>  |  41<H>  ----------------------------<  94  4.7   |  16<L>  |  2.25<H>    Ca    7.9<L>      02 Dec 2023 07:15            Urinalysis Basic - ( 02 Dec 2023 07:15 )    Color: x / Appearance: x / SG: x / pH: x  Gluc: 94 mg/dL / Ketone: x  / Bili: x / Urobili: x   Blood: x / Protein: x / Nitrite: x   Leuk Esterase: x / RBC: x / WBC x   Sq Epi: x / Non Sq Epi: x / Bacteria: x        RADIOLOGY & ADDITIONAL TESTS:    Imaging Personally Reviewed:    Consultant(s) Notes Reviewed:      Care Discussed with Consultants/Other Providers:

## 2023-12-02 NOTE — PROGRESS NOTE ADULT - SUBJECTIVE AND OBJECTIVE BOX
[facility]   [phone]  RENAL FOLLOW UP NOTE  --------------------------------------------------------------------------------  Chief Complaint:    24 hour events/subjective:    seen earlier   speaking in english   denies pain     PAST HISTORY  --------------------------------------------------------------------------------  No significant changes to PMH, PSH, FHx, SHx, unless otherwise noted    ALLERGIES & MEDICATIONS  --------------------------------------------------------------------------------  Allergies    No Known Allergies    Intolerances      Standing Inpatient Medications  artificial tears (preservative free) Ophthalmic Solution 1 Drop(s) Both EYES four times a day  dextrose 50% Injectable 25 Gram(s) IV Push once  dextrose 50% Injectable 12.5 Gram(s) IV Push once  glucagon  Injectable 1 milliGRAM(s) IntraMuscular once  influenza  Vaccine (HIGH DOSE) 0.7 milliLiter(s) IntraMuscular once  insulin lispro (ADMELOG) corrective regimen sliding scale   SubCutaneous three times a day before meals  insulin lispro (ADMELOG) corrective regimen sliding scale   SubCutaneous at bedtime  pantoprazole    Tablet 40 milliGRAM(s) Oral before breakfast  phenytoin   Suspension 100 milliGRAM(s) Oral at bedtime  primidone 250 milliGRAM(s) Oral at bedtime  sodium bicarbonate 650 milliGRAM(s) Oral three times a day  sodium chloride 0.9% lock flush 3 milliLiter(s) IV Push every 8 hours    PRN Inpatient Medications  dextrose Oral Gel 15 Gram(s) Oral once PRN  dextrose Oral Gel 15 Gram(s) Oral once PRN      REVIEW OF SYSTEMS  --------------------------------------------------------------------------------    Gen: denies  fevers/chills,  CVS: denies chest pain/palpitations  Resp: denies SOB/Cough  GI: Denies N/V/Abd pain  : Denies dysuria  VITALS/PHYSICAL EXAM  --------------------------------------------------------------------------------  T(C): 37.1 (12-02-23 @ 11:49), Max: 37.1 (12-02-23 @ 11:49)  HR: 96 (12-02-23 @ 11:49) (96 - 104)  BP: 145/70 (12-02-23 @ 11:49) (135/68 - 151/78)  RR: 18 (12-02-23 @ 11:49) (18 - 18)  SpO2: 98% (12-02-23 @ 11:49) (96% - 98%)  Wt(kg): --        12-01-23 @ 07:01  -  12-02-23 @ 07:00  --------------------------------------------------------  IN: 0 mL / OUT: 1225 mL / NET: -1225 mL    12-02-23 @ 07:01  -  12-02-23 @ 19:44  --------------------------------------------------------  IN: 0 mL / OUT: 200 mL / NET: -200 mL      Physical Exam:  	        	Gen: Non toxic comfortable appearing  facial ecchymosis and periorbital as well   	no jvd   	Pulm: decrease bs  no rales or ronchi or wheezing  	CV: RRR, S1S2; no rub  	Abd: +BS, soft, nontender/nondistended  	: No suprapubic tenderness  	UE: Warm, no cyanosis  no clubbing,  no edema  	LE: Warm, no cyanosis  no clubbing, no edema  	Neuro: alert and oriented. speech coherent       LABS/STUDIES  --------------------------------------------------------------------------------              8.0    5.06  >-----------<  117      [12-02-23 @ 07:16]              25.0     139  |  112  |  41  ----------------------------<  94      [12-02-23 @ 07:15]  4.7   |  16  |  2.25        Ca     7.9     [12-02-23 @ 07:15]            Creatinine Trend:  SCr 2.25 [12-02 @ 07:15]  SCr 1.89 [12-01 @ 06:47]  SCr 2.50 [11-30 @ 06:30]  SCr 2.18 [11-29 @ 12:11]  SCr 1.75 [11-28 @ 11:09]          Iron 61, TIBC 224, %sat 27      [11-30-23 @ 06:31]  Ferritin 202      [11-30-23 @ 06:31]  TSH 5.30      [11-30-23 @ 06:31]

## 2023-12-02 NOTE — PROGRESS NOTE ADULT - ASSESSMENT
Collins is an 83 y.o. M PMHx significant for epilepsy, HTN, BPH, hypothyroidism, who presents to the ED s/p fall to pavement .  Patient had direct impact to left side of face presents with severe ecchymosis of the left eye which is closed shut at this time.  Son who accompanies patient and translates as patient is non-English speaking, states patient was walking with a umbrella 5 steps in front of him.  Umbrella slipped causing him to fall to the floor.  Patient's son states prior to fall patient was not lightheaded or dizzy. He denies LOC after fall.  Patient did not appear to be confused after fall.  Additionally, c.o. pain to the L. knee which has a superficial knee abrasion. noticed with abn creatinine.      1- CKD III based on previous blood work in the system when reviewed  2- HTN   3- syncope     cr improving to baseline   trend hb   acidosis  sodium bicarbonate 650 mg po tid  check lactic acid and bet hydroxy butyrate   trend hb given anemia   check cpk

## 2023-12-03 LAB
ALBUMIN SERPL ELPH-MCNC: 3.5 G/DL — SIGNIFICANT CHANGE UP (ref 3.3–5)
ALBUMIN SERPL ELPH-MCNC: 3.5 G/DL — SIGNIFICANT CHANGE UP (ref 3.3–5)
ALP SERPL-CCNC: 64 U/L — SIGNIFICANT CHANGE UP (ref 40–120)
ALP SERPL-CCNC: 64 U/L — SIGNIFICANT CHANGE UP (ref 40–120)
ALT FLD-CCNC: 19 U/L — SIGNIFICANT CHANGE UP (ref 10–45)
ALT FLD-CCNC: 19 U/L — SIGNIFICANT CHANGE UP (ref 10–45)
ANION GAP SERPL CALC-SCNC: 11 MMOL/L — SIGNIFICANT CHANGE UP (ref 5–17)
ANION GAP SERPL CALC-SCNC: 11 MMOL/L — SIGNIFICANT CHANGE UP (ref 5–17)
AST SERPL-CCNC: 23 U/L — SIGNIFICANT CHANGE UP (ref 10–40)
AST SERPL-CCNC: 23 U/L — SIGNIFICANT CHANGE UP (ref 10–40)
B-OH-BUTYR SERPL-SCNC: 0.4 MMOL/L — SIGNIFICANT CHANGE UP
B-OH-BUTYR SERPL-SCNC: 0.4 MMOL/L — SIGNIFICANT CHANGE UP
BILIRUB SERPL-MCNC: 0.3 MG/DL — SIGNIFICANT CHANGE UP (ref 0.2–1.2)
BILIRUB SERPL-MCNC: 0.3 MG/DL — SIGNIFICANT CHANGE UP (ref 0.2–1.2)
BUN SERPL-MCNC: 39 MG/DL — HIGH (ref 7–23)
BUN SERPL-MCNC: 39 MG/DL — HIGH (ref 7–23)
CALCIUM SERPL-MCNC: 8 MG/DL — LOW (ref 8.4–10.5)
CALCIUM SERPL-MCNC: 8 MG/DL — LOW (ref 8.4–10.5)
CHLORIDE SERPL-SCNC: 114 MMOL/L — HIGH (ref 96–108)
CHLORIDE SERPL-SCNC: 114 MMOL/L — HIGH (ref 96–108)
CK SERPL-CCNC: 248 U/L — HIGH (ref 30–200)
CK SERPL-CCNC: 248 U/L — HIGH (ref 30–200)
CO2 SERPL-SCNC: 17 MMOL/L — LOW (ref 22–31)
CO2 SERPL-SCNC: 17 MMOL/L — LOW (ref 22–31)
CREAT SERPL-MCNC: 2 MG/DL — HIGH (ref 0.5–1.3)
CREAT SERPL-MCNC: 2 MG/DL — HIGH (ref 0.5–1.3)
EGFR: 32 ML/MIN/1.73M2 — LOW
EGFR: 32 ML/MIN/1.73M2 — LOW
GLUCOSE BLDC GLUCOMTR-MCNC: 115 MG/DL — HIGH (ref 70–99)
GLUCOSE BLDC GLUCOMTR-MCNC: 115 MG/DL — HIGH (ref 70–99)
GLUCOSE BLDC GLUCOMTR-MCNC: 119 MG/DL — HIGH (ref 70–99)
GLUCOSE BLDC GLUCOMTR-MCNC: 119 MG/DL — HIGH (ref 70–99)
GLUCOSE BLDC GLUCOMTR-MCNC: 135 MG/DL — HIGH (ref 70–99)
GLUCOSE BLDC GLUCOMTR-MCNC: 135 MG/DL — HIGH (ref 70–99)
GLUCOSE BLDC GLUCOMTR-MCNC: 178 MG/DL — HIGH (ref 70–99)
GLUCOSE BLDC GLUCOMTR-MCNC: 178 MG/DL — HIGH (ref 70–99)
GLUCOSE SERPL-MCNC: 93 MG/DL — SIGNIFICANT CHANGE UP (ref 70–99)
GLUCOSE SERPL-MCNC: 93 MG/DL — SIGNIFICANT CHANGE UP (ref 70–99)
LACTATE SERPL-SCNC: 0.7 MMOL/L — SIGNIFICANT CHANGE UP (ref 0.5–2)
LACTATE SERPL-SCNC: 0.7 MMOL/L — SIGNIFICANT CHANGE UP (ref 0.5–2)
POTASSIUM SERPL-MCNC: 4.4 MMOL/L — SIGNIFICANT CHANGE UP (ref 3.5–5.3)
POTASSIUM SERPL-MCNC: 4.4 MMOL/L — SIGNIFICANT CHANGE UP (ref 3.5–5.3)
POTASSIUM SERPL-SCNC: 4.4 MMOL/L — SIGNIFICANT CHANGE UP (ref 3.5–5.3)
POTASSIUM SERPL-SCNC: 4.4 MMOL/L — SIGNIFICANT CHANGE UP (ref 3.5–5.3)
PROT SERPL-MCNC: 5.8 G/DL — LOW (ref 6–8.3)
PROT SERPL-MCNC: 5.8 G/DL — LOW (ref 6–8.3)
SODIUM SERPL-SCNC: 142 MMOL/L — SIGNIFICANT CHANGE UP (ref 135–145)
SODIUM SERPL-SCNC: 142 MMOL/L — SIGNIFICANT CHANGE UP (ref 135–145)

## 2023-12-03 RX ADMIN — Medication 1 DROP(S): at 12:23

## 2023-12-03 RX ADMIN — Medication 1: at 12:44

## 2023-12-03 RX ADMIN — Medication 650 MILLIGRAM(S): at 12:45

## 2023-12-03 RX ADMIN — PANTOPRAZOLE SODIUM 40 MILLIGRAM(S): 20 TABLET, DELAYED RELEASE ORAL at 05:50

## 2023-12-03 RX ADMIN — SODIUM CHLORIDE 3 MILLILITER(S): 9 INJECTION INTRAMUSCULAR; INTRAVENOUS; SUBCUTANEOUS at 07:27

## 2023-12-03 RX ADMIN — PRIMIDONE 250 MILLIGRAM(S): 250 TABLET ORAL at 21:33

## 2023-12-03 RX ADMIN — Medication 1 DROP(S): at 05:50

## 2023-12-03 RX ADMIN — Medication 1 DROP(S): at 17:17

## 2023-12-03 RX ADMIN — Medication 650 MILLIGRAM(S): at 05:50

## 2023-12-03 RX ADMIN — SODIUM CHLORIDE 3 MILLILITER(S): 9 INJECTION INTRAMUSCULAR; INTRAVENOUS; SUBCUTANEOUS at 14:39

## 2023-12-03 RX ADMIN — SODIUM CHLORIDE 3 MILLILITER(S): 9 INJECTION INTRAMUSCULAR; INTRAVENOUS; SUBCUTANEOUS at 20:39

## 2023-12-03 RX ADMIN — Medication 100 MILLIGRAM(S): at 21:32

## 2023-12-03 RX ADMIN — Medication 650 MILLIGRAM(S): at 21:32

## 2023-12-03 NOTE — PROGRESS NOTE ADULT - SUBJECTIVE AND OBJECTIVE BOX
DATE OF SERVICE: 12-03-23 @ 16:55    Patient is a 83y old  Male who presents with a chief complaint of s/p fall (02 Dec 2023 19:44)      SUBJECTIVE / OVERNIGHT EVENTS:  No chest pain. No shortness of breath. No complaints. No events overnight.     MEDICATIONS  (STANDING):  artificial tears (preservative free) Ophthalmic Solution 1 Drop(s) Both EYES four times a day  dextrose 50% Injectable 25 Gram(s) IV Push once  dextrose 50% Injectable 12.5 Gram(s) IV Push once  glucagon  Injectable 1 milliGRAM(s) IntraMuscular once  influenza  Vaccine (HIGH DOSE) 0.7 milliLiter(s) IntraMuscular once  insulin lispro (ADMELOG) corrective regimen sliding scale   SubCutaneous three times a day before meals  insulin lispro (ADMELOG) corrective regimen sliding scale   SubCutaneous at bedtime  pantoprazole    Tablet 40 milliGRAM(s) Oral before breakfast  phenytoin   Suspension 100 milliGRAM(s) Oral at bedtime  primidone 250 milliGRAM(s) Oral at bedtime  sodium bicarbonate 650 milliGRAM(s) Oral three times a day  sodium chloride 0.9% lock flush 3 milliLiter(s) IV Push every 8 hours    MEDICATIONS  (PRN):  dextrose Oral Gel 15 Gram(s) Oral once PRN Blood Glucose LESS THAN 70 milliGRAM(s)/deciliter  dextrose Oral Gel 15 Gram(s) Oral once PRN Blood Glucose LESS THAN 70 milliGRAM(s)/deciliter      Vital Signs Last 24 Hrs  T(C): 36.9 (03 Dec 2023 15:55), Max: 36.9 (02 Dec 2023 20:24)  T(F): 98.5 (03 Dec 2023 15:55), Max: 98.5 (03 Dec 2023 15:55)  HR: 93 (03 Dec 2023 15:55) (65 - 103)  BP: 121/64 (03 Dec 2023 15:55) (121/64 - 136/70)  BP(mean): --  RR: 18 (03 Dec 2023 15:55) (17 - 18)  SpO2: 97% (03 Dec 2023 15:55) (95% - 97%)    Parameters below as of 03 Dec 2023 15:55  Patient On (Oxygen Delivery Method): room air      CAPILLARY BLOOD GLUCOSE      POCT Blood Glucose.: 178 mg/dL (03 Dec 2023 12:34)  POCT Blood Glucose.: 115 mg/dL (03 Dec 2023 08:26)  POCT Blood Glucose.: 127 mg/dL (02 Dec 2023 21:17)  POCT Blood Glucose.: 129 mg/dL (02 Dec 2023 17:15)    I&O's Summary    02 Dec 2023 07:01  -  03 Dec 2023 07:00  --------------------------------------------------------  IN: 260 mL / OUT: 750 mL / NET: -490 mL    03 Dec 2023 07:01  -  03 Dec 2023 16:55  --------------------------------------------------------  IN: 240 mL / OUT: 750 mL / NET: -510 mL        PHYSICAL EXAM:  GENERAL: NAD, well-developed  HEAD:  Atraumatic, Normocephalic  EYES: EOMI, PERRLA, conjunctiva and sclera clear, periorbital echymosis  NECK: Supple, No JVD  CHEST/LUNG: Clear to auscultation bilaterally; No wheeze  HEART: Regular rate and rhythm; No murmurs, rubs, or gallops  ABDOMEN: Soft, Nontender, Nondistended; Bowel sounds present  EXTREMITIES:  2+ Peripheral Pulses, No clubbing, cyanosis, or edema  PSYCH: AAOx3  NEUROLOGY: non-focal  SKIN: No rashes or lesions    LABS:                        8.0    5.06  )-----------( 117      ( 02 Dec 2023 07:16 )             25.0     12-03    142  |  114<H>  |  39<H>  ----------------------------<  93  4.4   |  17<L>  |  2.00<H>    Ca    8.0<L>      03 Dec 2023 07:02    TPro  5.8<L>  /  Alb  3.5  /  TBili  0.3  /  DBili  x   /  AST  23  /  ALT  19  /  AlkPhos  64  12-03      CARDIAC MARKERS ( 03 Dec 2023 07:02 )  x     / x     / 248 U/L / x     / x          Urinalysis Basic - ( 03 Dec 2023 07:02 )    Color: x / Appearance: x / SG: x / pH: x  Gluc: 93 mg/dL / Ketone: x  / Bili: x / Urobili: x   Blood: x / Protein: x / Nitrite: x   Leuk Esterase: x / RBC: x / WBC x   Sq Epi: x / Non Sq Epi: x / Bacteria: x        RADIOLOGY & ADDITIONAL TESTS:    Imaging Personally Reviewed:    Consultant(s) Notes Reviewed:      Care Discussed with Consultants/Other Providers:

## 2023-12-03 NOTE — PROGRESS NOTE ADULT - SUBJECTIVE AND OBJECTIVE BOX
[facility]   [phone]  RENAL FOLLOW UP NOTE  --------------------------------------------------------------------------------  Chief Complaint:    24 hour events/subjective:    seen earlier   in english states feels well     PAST HISTORY  --------------------------------------------------------------------------------  No significant changes to PMH, PSH, FHx, SHx, unless otherwise noted    ALLERGIES & MEDICATIONS  --------------------------------------------------------------------------------  Allergies    No Known Allergies    Intolerances      Standing Inpatient Medications  artificial tears (preservative free) Ophthalmic Solution 1 Drop(s) Both EYES four times a day  dextrose 50% Injectable 25 Gram(s) IV Push once  dextrose 50% Injectable 12.5 Gram(s) IV Push once  glucagon  Injectable 1 milliGRAM(s) IntraMuscular once  influenza  Vaccine (HIGH DOSE) 0.7 milliLiter(s) IntraMuscular once  insulin lispro (ADMELOG) corrective regimen sliding scale   SubCutaneous three times a day before meals  insulin lispro (ADMELOG) corrective regimen sliding scale   SubCutaneous at bedtime  pantoprazole    Tablet 40 milliGRAM(s) Oral before breakfast  phenytoin   Suspension 100 milliGRAM(s) Oral at bedtime  primidone 250 milliGRAM(s) Oral at bedtime  sodium bicarbonate 650 milliGRAM(s) Oral three times a day  sodium chloride 0.9% lock flush 3 milliLiter(s) IV Push every 8 hours    PRN Inpatient Medications  dextrose Oral Gel 15 Gram(s) Oral once PRN  dextrose Oral Gel 15 Gram(s) Oral once PRN      REVIEW OF SYSTEMS  --------------------------------------------------------------------------------    Gen: denies  fevers/chills,  CVS: denies chest pain/palpitations  Resp: denies SOB/Cough  GI: Denies N/V/Abd pain  : Denies dysuria    VITALS/PHYSICAL EXAM  --------------------------------------------------------------------------------  T(C): 36.9 (12-03-23 @ 15:55), Max: 36.9 (12-02-23 @ 20:24)  HR: 93 (12-03-23 @ 15:55) (65 - 103)  BP: 121/64 (12-03-23 @ 15:55) (121/64 - 136/70)  RR: 18 (12-03-23 @ 15:55) (17 - 18)  SpO2: 97% (12-03-23 @ 15:55) (95% - 97%)  Wt(kg): --        12-02-23 @ 07:01  -  12-03-23 @ 07:00  --------------------------------------------------------  IN: 260 mL / OUT: 750 mL / NET: -490 mL    12-03-23 @ 07:01  -  12-03-23 @ 19:18  --------------------------------------------------------  IN: 540 mL / OUT: 1300 mL / NET: -760 mL      Physical Exam:  	    	Gen: Non toxic comfortable appearing  facial ecchymosis and periorbital as well   	no jvd   	Pulm: decrease bs  no rales or ronchi or wheezing  	CV: RRR, S1S2; no rub  	Abd: +BS, soft, nontender/nondistended  	: No suprapubic tenderness  	UE: Warm, no cyanosis  no clubbing,  no edema  	LE: Warm, no cyanosis  no clubbing, no edema  	Neuro: alert and oriented. speech coherent     LABS/STUDIES  --------------------------------------------------------------------------------              8.0    5.06  >-----------<  117      [12-02-23 @ 07:16]              25.0     142  |  114  |  39  ----------------------------<  93      [12-03-23 @ 07:02]  4.4   |  17  |  2.00        Ca     8.0     [12-03-23 @ 07:02]    TPro  5.8  /  Alb  3.5  /  TBili  0.3  /  DBili  x   /  AST  23  /  ALT  19  /  AlkPhos  64  [12-03-23 @ 07:02]              [12-03-23 @ 07:02]    Creatinine Trend:  SCr 2.00 [12-03 @ 07:02]  SCr 2.25 [12-02 @ 07:15]  SCr 1.89 [12-01 @ 06:47]  SCr 2.50 [11-30 @ 06:30]  SCr 2.18 [11-29 @ 12:11]        Iron 61, TIBC 224, %sat 27      [11-30-23 @ 06:31]  Ferritin 202      [11-30-23 @ 06:31]  TSH 5.30      [11-30-23 @ 06:31]         [facility]   [phone]  RENAL FOLLOW UP NOTE  --------------------------------------------------------------------------------  Chief Complaint:    24 hour events/subjective:    seen earlier   in english states feels well     PAST HISTORY  --------------------------------------------------------------------------------  No significant changes to PMH, PSH, FHx, SHx, unless otherwise noted    ALLERGIES & MEDICATIONS  --------------------------------------------------------------------------------  Allergies    No Known Allergies    Intolerances      Standing Inpatient Medications  artificial tears (preservative free) Ophthalmic Solution 1 Drop(s) Both EYES four times a day  dextrose 50% Injectable 25 Gram(s) IV Push once  dextrose 50% Injectable 12.5 Gram(s) IV Push once  glucagon  Injectable 1 milliGRAM(s) IntraMuscular once  influenza  Vaccine (HIGH DOSE) 0.7 milliLiter(s) IntraMuscular once  insulin lispro (ADMELOG) corrective regimen sliding scale   SubCutaneous three times a day before meals  insulin lispro (ADMELOG) corrective regimen sliding scale   SubCutaneous at bedtime  pantoprazole    Tablet 40 milliGRAM(s) Oral before breakfast  phenytoin   Suspension 100 milliGRAM(s) Oral at bedtime  primidone 250 milliGRAM(s) Oral at bedtime  sodium bicarbonate 650 milliGRAM(s) Oral three times a day  sodium chloride 0.9% lock flush 3 milliLiter(s) IV Push every 8 hours    PRN Inpatient Medications  dextrose Oral Gel 15 Gram(s) Oral once PRN  dextrose Oral Gel 15 Gram(s) Oral once PRN      REVIEW OF SYSTEMS  --------------------------------------------------------------------------------    Gen: denies  fevers/chills,  CVS: denies chest pain/palpitations  Resp: denies SOB/Cough  GI: Denies N/V/Abd pain  : Denies dysuria    VITALS/PHYSICAL EXAM  --------------------------------------------------------------------------------  T(C): 36.9 (12-03-23 @ 15:55), Max: 36.9 (12-02-23 @ 20:24)  HR: 93 (12-03-23 @ 15:55) (65 - 103)  BP: 121/64 (12-03-23 @ 15:55) (121/64 - 136/70)  RR: 18 (12-03-23 @ 15:55) (17 - 18)  SpO2: 97% (12-03-23 @ 15:55) (95% - 97%)  Wt(kg): --        12-02-23 @ 07:01  -  12-03-23 @ 07:00  --------------------------------------------------------  IN: 260 mL / OUT: 750 mL / NET: -490 mL    12-03-23 @ 07:01  -  12-03-23 @ 19:18  --------------------------------------------------------  IN: 540 mL / OUT: 1300 mL / NET: -760 mL      Physical Exam:  	    	Gen: Non toxic comfortable appearing  facial ecchymosis and periorbital as well   	no jvd   	Pulm: decrease bs  no rales or ronchi or wheezing  	CV: RRR, S1S2; no rub  	Abd: +BS, soft, nontender/nondistended  	: No suprapubic tenderness  	UE: Warm, no cyanosis  no clubbing,  no edema  	LE: Warm, no cyanosis  no clubbing, no edema  	Neuro: alert and oriented. speech coherent     LABS/STUDIES  --------------------------------------------------------------------------------              8.0    5.06  >-----------<  117      [12-02-23 @ 07:16]              25.0     142  |  114  |  39  ----------------------------<  93      [12-03-23 @ 07:02]  4.4   |  17  |  2.00        Ca     8.0     [12-03-23 @ 07:02]    TPro  5.8  /  Alb  3.5  /  TBili  0.3  /  DBili  x   /  AST  23  /  ALT  19  /  AlkPhos  64  [12-03-23 @ 07:02]              [12-03-23 @ 07:02]    Creatinine Trend:  SCr 2.00 [12-03 @ 07:02]  SCr 2.25 [12-02 @ 07:15]  SCr 1.89 [12-01 @ 06:47]  SCr 2.50 [11-30 @ 06:30]  SCr 2.18 [11-29 @ 12:11]        Iron 61, TIBC 224, %sat 27      [11-30-23 @ 06:31]  Ferritin 202      [11-30-23 @ 06:31]  TSH 5.30      [11-30-23 @ 06:31]

## 2023-12-03 NOTE — PROGRESS NOTE ADULT - ASSESSMENT
Collins is an 83 y.o. M PMHx significant for epilepsy, HTN, BPH, hypothyroidism, who presents to the ED s/p fall to pavement .  Patient had direct impact to left side of face presents with severe ecchymosis of the left eye which is closed shut at this time.  Son who accompanies patient and translates as patient is non-English speaking, states patient was walking with a umbrella 5 steps in front of him.  Umbrella slipped causing him to fall to the floor.  Patient's son states prior to fall patient was not lightheaded or dizzy. He denies LOC after fall.  Patient did not appear to be confused after fall.  Additionally, c.o. pain to the L. knee which has a superficial knee abrasion. noticed with abn creatinine.      1- CKD III based on previous blood work in the system when reviewed  2- HTN   3- syncope   4- anemia     cr steady   trend hb  check retic ct and iron profile   acidosis  sodium bicarbonate 650 mg po tid lactic acid and bet hydroxy butyrate  in range   cpk in range

## 2023-12-03 NOTE — PROGRESS NOTE ADULT - ASSESSMENT
83 y.o. M PMHx significant for epilepsy, HTN, BPH, hypothyroidism, type a aortic dissection s/p repair in 2019 who presents to the ED s/p fall, admitted to CTU for monitoring s/p labetalol/cardene gtt. Medicine consulted for BP management and tx to medicine service.   Patient is feeling well without any complaints. Currently normotensive without any BP agents. 2d echo normal.  - Would not add any BP agents at this time as SBP remains 100-120s, if trends up add back home meds.    CKD  - nephrology following  - avoid nephrotoxins  - cont bicarb  - d/c iv f     incidental nonspecific rectal wall enhancement on CT  - recommend opt GI referral if within patient's GOC.     anemia  - iron studies c/w ACD  -  ct neg for rp bleed    diabetes  - fs qid  - hgb a1c 5.4  - insulin ss    dispo - PT eval  - Home PT  d/c planning

## 2023-12-04 LAB
ANION GAP SERPL CALC-SCNC: 15 MMOL/L — SIGNIFICANT CHANGE UP (ref 5–17)
ANION GAP SERPL CALC-SCNC: 15 MMOL/L — SIGNIFICANT CHANGE UP (ref 5–17)
APPEARANCE UR: CLEAR — SIGNIFICANT CHANGE UP
APPEARANCE UR: CLEAR — SIGNIFICANT CHANGE UP
BACTERIA # UR AUTO: NEGATIVE /HPF — SIGNIFICANT CHANGE UP
BACTERIA # UR AUTO: NEGATIVE /HPF — SIGNIFICANT CHANGE UP
BILIRUB UR-MCNC: NEGATIVE — SIGNIFICANT CHANGE UP
BILIRUB UR-MCNC: NEGATIVE — SIGNIFICANT CHANGE UP
BUN SERPL-MCNC: 47 MG/DL — HIGH (ref 7–23)
BUN SERPL-MCNC: 47 MG/DL — HIGH (ref 7–23)
CALCIUM SERPL-MCNC: 8.3 MG/DL — LOW (ref 8.4–10.5)
CALCIUM SERPL-MCNC: 8.3 MG/DL — LOW (ref 8.4–10.5)
CAST: 1 /LPF — SIGNIFICANT CHANGE UP (ref 0–4)
CAST: 1 /LPF — SIGNIFICANT CHANGE UP (ref 0–4)
CHLORIDE SERPL-SCNC: 113 MMOL/L — HIGH (ref 96–108)
CHLORIDE SERPL-SCNC: 113 MMOL/L — HIGH (ref 96–108)
CO2 SERPL-SCNC: 16 MMOL/L — LOW (ref 22–31)
CO2 SERPL-SCNC: 16 MMOL/L — LOW (ref 22–31)
COLOR SPEC: YELLOW — SIGNIFICANT CHANGE UP
COLOR SPEC: YELLOW — SIGNIFICANT CHANGE UP
CREAT ?TM UR-MCNC: 73 MG/DL — SIGNIFICANT CHANGE UP
CREAT ?TM UR-MCNC: 73 MG/DL — SIGNIFICANT CHANGE UP
CREAT SERPL-MCNC: 2.44 MG/DL — HIGH (ref 0.5–1.3)
CREAT SERPL-MCNC: 2.44 MG/DL — HIGH (ref 0.5–1.3)
DIFF PNL FLD: ABNORMAL
DIFF PNL FLD: ABNORMAL
EGFR: 25 ML/MIN/1.73M2 — LOW
EGFR: 25 ML/MIN/1.73M2 — LOW
FERRITIN SERPL-MCNC: 182 NG/ML — SIGNIFICANT CHANGE UP (ref 30–400)
FERRITIN SERPL-MCNC: 182 NG/ML — SIGNIFICANT CHANGE UP (ref 30–400)
GLUCOSE BLDC GLUCOMTR-MCNC: 116 MG/DL — HIGH (ref 70–99)
GLUCOSE BLDC GLUCOMTR-MCNC: 116 MG/DL — HIGH (ref 70–99)
GLUCOSE BLDC GLUCOMTR-MCNC: 121 MG/DL — HIGH (ref 70–99)
GLUCOSE BLDC GLUCOMTR-MCNC: 121 MG/DL — HIGH (ref 70–99)
GLUCOSE BLDC GLUCOMTR-MCNC: 133 MG/DL — HIGH (ref 70–99)
GLUCOSE BLDC GLUCOMTR-MCNC: 133 MG/DL — HIGH (ref 70–99)
GLUCOSE BLDC GLUCOMTR-MCNC: 95 MG/DL — SIGNIFICANT CHANGE UP (ref 70–99)
GLUCOSE BLDC GLUCOMTR-MCNC: 95 MG/DL — SIGNIFICANT CHANGE UP (ref 70–99)
GLUCOSE SERPL-MCNC: 100 MG/DL — HIGH (ref 70–99)
GLUCOSE SERPL-MCNC: 100 MG/DL — HIGH (ref 70–99)
GLUCOSE UR QL: NEGATIVE MG/DL — SIGNIFICANT CHANGE UP
GLUCOSE UR QL: NEGATIVE MG/DL — SIGNIFICANT CHANGE UP
HCT VFR BLD CALC: 26.7 % — LOW (ref 39–50)
HCT VFR BLD CALC: 26.7 % — LOW (ref 39–50)
HGB BLD-MCNC: 8.6 G/DL — LOW (ref 13–17)
HGB BLD-MCNC: 8.6 G/DL — LOW (ref 13–17)
IRON SATN MFR SERPL: 32 % — SIGNIFICANT CHANGE UP (ref 16–55)
IRON SATN MFR SERPL: 32 % — SIGNIFICANT CHANGE UP (ref 16–55)
IRON SATN MFR SERPL: 62 UG/DL — SIGNIFICANT CHANGE UP (ref 45–165)
IRON SATN MFR SERPL: 62 UG/DL — SIGNIFICANT CHANGE UP (ref 45–165)
KETONES UR-MCNC: NEGATIVE MG/DL — SIGNIFICANT CHANGE UP
KETONES UR-MCNC: NEGATIVE MG/DL — SIGNIFICANT CHANGE UP
LEUKOCYTE ESTERASE UR-ACNC: NEGATIVE — SIGNIFICANT CHANGE UP
LEUKOCYTE ESTERASE UR-ACNC: NEGATIVE — SIGNIFICANT CHANGE UP
MAGNESIUM SERPL-MCNC: 2.1 MG/DL — SIGNIFICANT CHANGE UP (ref 1.6–2.6)
MAGNESIUM SERPL-MCNC: 2.1 MG/DL — SIGNIFICANT CHANGE UP (ref 1.6–2.6)
MCHC RBC-ENTMCNC: 32.1 PG — SIGNIFICANT CHANGE UP (ref 27–34)
MCHC RBC-ENTMCNC: 32.1 PG — SIGNIFICANT CHANGE UP (ref 27–34)
MCHC RBC-ENTMCNC: 32.2 GM/DL — SIGNIFICANT CHANGE UP (ref 32–36)
MCHC RBC-ENTMCNC: 32.2 GM/DL — SIGNIFICANT CHANGE UP (ref 32–36)
MCV RBC AUTO: 99.6 FL — SIGNIFICANT CHANGE UP (ref 80–100)
MCV RBC AUTO: 99.6 FL — SIGNIFICANT CHANGE UP (ref 80–100)
NITRITE UR-MCNC: NEGATIVE — SIGNIFICANT CHANGE UP
NITRITE UR-MCNC: NEGATIVE — SIGNIFICANT CHANGE UP
NRBC # BLD: 0 /100 WBCS — SIGNIFICANT CHANGE UP (ref 0–0)
NRBC # BLD: 0 /100 WBCS — SIGNIFICANT CHANGE UP (ref 0–0)
PH UR: 6 — SIGNIFICANT CHANGE UP (ref 5–8)
PH UR: 6 — SIGNIFICANT CHANGE UP (ref 5–8)
PHOSPHATE SERPL-MCNC: 3.6 MG/DL — SIGNIFICANT CHANGE UP (ref 2.5–4.5)
PHOSPHATE SERPL-MCNC: 3.6 MG/DL — SIGNIFICANT CHANGE UP (ref 2.5–4.5)
PLATELET # BLD AUTO: 129 K/UL — LOW (ref 150–400)
PLATELET # BLD AUTO: 129 K/UL — LOW (ref 150–400)
POTASSIUM SERPL-MCNC: 4.5 MMOL/L — SIGNIFICANT CHANGE UP (ref 3.5–5.3)
POTASSIUM SERPL-MCNC: 4.5 MMOL/L — SIGNIFICANT CHANGE UP (ref 3.5–5.3)
POTASSIUM SERPL-SCNC: 4.5 MMOL/L — SIGNIFICANT CHANGE UP (ref 3.5–5.3)
POTASSIUM SERPL-SCNC: 4.5 MMOL/L — SIGNIFICANT CHANGE UP (ref 3.5–5.3)
PROT ?TM UR-MCNC: 28 MG/DL — HIGH (ref 0–12)
PROT ?TM UR-MCNC: 28 MG/DL — HIGH (ref 0–12)
PROT UR-MCNC: 30 MG/DL
PROT UR-MCNC: 30 MG/DL
PROT/CREAT UR-RTO: 0.4 RATIO — HIGH (ref 0–0.2)
PROT/CREAT UR-RTO: 0.4 RATIO — HIGH (ref 0–0.2)
RBC # BLD: 2.68 M/UL — LOW (ref 4.2–5.8)
RBC # BLD: 2.68 M/UL — LOW (ref 4.2–5.8)
RBC # BLD: 2.69 M/UL — LOW (ref 4.2–5.8)
RBC # BLD: 2.69 M/UL — LOW (ref 4.2–5.8)
RBC # FLD: 13.9 % — SIGNIFICANT CHANGE UP (ref 10.3–14.5)
RBC # FLD: 13.9 % — SIGNIFICANT CHANGE UP (ref 10.3–14.5)
RBC CASTS # UR COMP ASSIST: 8 /HPF — HIGH (ref 0–4)
RBC CASTS # UR COMP ASSIST: 8 /HPF — HIGH (ref 0–4)
RETICS #: 82.3 K/UL — SIGNIFICANT CHANGE UP (ref 25–125)
RETICS #: 82.3 K/UL — SIGNIFICANT CHANGE UP (ref 25–125)
RETICS/RBC NFR: 3.1 % — HIGH (ref 0.5–2.5)
RETICS/RBC NFR: 3.1 % — HIGH (ref 0.5–2.5)
SODIUM SERPL-SCNC: 144 MMOL/L — SIGNIFICANT CHANGE UP (ref 135–145)
SODIUM SERPL-SCNC: 144 MMOL/L — SIGNIFICANT CHANGE UP (ref 135–145)
SP GR SPEC: 1.01 — SIGNIFICANT CHANGE UP (ref 1–1.03)
SP GR SPEC: 1.01 — SIGNIFICANT CHANGE UP (ref 1–1.03)
SQUAMOUS # UR AUTO: 0 /HPF — SIGNIFICANT CHANGE UP (ref 0–5)
SQUAMOUS # UR AUTO: 0 /HPF — SIGNIFICANT CHANGE UP (ref 0–5)
TIBC SERPL-MCNC: 191 UG/DL — LOW (ref 220–430)
TIBC SERPL-MCNC: 191 UG/DL — LOW (ref 220–430)
UIBC SERPL-MCNC: 129 UG/DL — SIGNIFICANT CHANGE UP (ref 110–370)
UIBC SERPL-MCNC: 129 UG/DL — SIGNIFICANT CHANGE UP (ref 110–370)
UROBILINOGEN FLD QL: 0.2 MG/DL — SIGNIFICANT CHANGE UP (ref 0.2–1)
UROBILINOGEN FLD QL: 0.2 MG/DL — SIGNIFICANT CHANGE UP (ref 0.2–1)
WBC # BLD: 5.81 K/UL — SIGNIFICANT CHANGE UP (ref 3.8–10.5)
WBC # BLD: 5.81 K/UL — SIGNIFICANT CHANGE UP (ref 3.8–10.5)
WBC # FLD AUTO: 5.81 K/UL — SIGNIFICANT CHANGE UP (ref 3.8–10.5)
WBC # FLD AUTO: 5.81 K/UL — SIGNIFICANT CHANGE UP (ref 3.8–10.5)
WBC UR QL: 0 /HPF — SIGNIFICANT CHANGE UP (ref 0–5)
WBC UR QL: 0 /HPF — SIGNIFICANT CHANGE UP (ref 0–5)

## 2023-12-04 PROCEDURE — 93010 ELECTROCARDIOGRAM REPORT: CPT

## 2023-12-04 RX ORDER — DIPHENHYDRAMINE HCL 50 MG
25 CAPSULE ORAL EVERY 6 HOURS
Refills: 0 | Status: DISCONTINUED | OUTPATIENT
Start: 2023-12-04 | End: 2023-12-08

## 2023-12-04 RX ORDER — DIPHENHYDRAMINE HCL 50 MG
50 CAPSULE ORAL ONCE
Refills: 0 | Status: COMPLETED | OUTPATIENT
Start: 2023-12-04 | End: 2023-12-04

## 2023-12-04 RX ADMIN — Medication 650 MILLIGRAM(S): at 13:01

## 2023-12-04 RX ADMIN — Medication 650 MILLIGRAM(S): at 21:19

## 2023-12-04 RX ADMIN — Medication 1 DROP(S): at 05:38

## 2023-12-04 RX ADMIN — Medication 650 MILLIGRAM(S): at 05:39

## 2023-12-04 RX ADMIN — Medication 1 DROP(S): at 18:05

## 2023-12-04 RX ADMIN — SODIUM CHLORIDE 3 MILLILITER(S): 9 INJECTION INTRAMUSCULAR; INTRAVENOUS; SUBCUTANEOUS at 13:14

## 2023-12-04 RX ADMIN — Medication 1 DROP(S): at 23:44

## 2023-12-04 RX ADMIN — Medication 50 MILLIGRAM(S): at 11:15

## 2023-12-04 RX ADMIN — SODIUM CHLORIDE 3 MILLILITER(S): 9 INJECTION INTRAMUSCULAR; INTRAVENOUS; SUBCUTANEOUS at 07:50

## 2023-12-04 RX ADMIN — Medication 100 MILLIGRAM(S): at 21:19

## 2023-12-04 RX ADMIN — PRIMIDONE 250 MILLIGRAM(S): 250 TABLET ORAL at 21:19

## 2023-12-04 RX ADMIN — Medication 1 DROP(S): at 00:20

## 2023-12-04 RX ADMIN — SODIUM CHLORIDE 3 MILLILITER(S): 9 INJECTION INTRAMUSCULAR; INTRAVENOUS; SUBCUTANEOUS at 21:55

## 2023-12-04 RX ADMIN — Medication 1 DROP(S): at 13:01

## 2023-12-04 RX ADMIN — PANTOPRAZOLE SODIUM 40 MILLIGRAM(S): 20 TABLET, DELAYED RELEASE ORAL at 05:38

## 2023-12-04 NOTE — PROGRESS NOTE ADULT - ASSESSMENT
Collins is an 83 y.o. M PMHx significant for epilepsy, HTN, BPH, hypothyroidism, who presents to the ED s/p fall to pavement .  Patient had direct impact to left side of face presents with severe ecchymosis of the left eye which is closed shut at this time.  Son who accompanies patient and translates as patient is non-English speaking, states patient was walking with a umbrella 5 steps in front of him.  Umbrella slipped causing him to fall to the floor.  Patient's son states prior to fall patient was not lightheaded or dizzy. He denies LOC after fall.  Patient did not appear to be confused after fall.  Additionally, c.o. pain to the L. knee which has a superficial knee abrasion. noticed with abn creatinine.      1- CKD III based on previous blood work in the system when reviewed  2- HTN   3- syncope   4- anemia     creatinine worsening today  will need to clarify baseline  check U/A and urine protein/creatinine ratio  anemia, trend hgb  acidosis  sodium bicarbonate 650 mg po tid   lactic acid and bet hydroxy butyrate  in range   cpk in range   recent ct a/p without hydro  strict I/O  trend creatinine and electrolytes daily

## 2023-12-04 NOTE — PROVIDER CONTACT NOTE (OTHER) - BACKGROUND
patient admitted for aortic dissection
Pt present s/p fall - L/prox ICA plaque no intervention. L periorbital hematoma, fronal scalp hematoma. CKD, acidosis on sodium bicarb.

## 2023-12-04 NOTE — PROGRESS NOTE ADULT - SUBJECTIVE AND OBJECTIVE BOX
[facility]   [phone]  RENAL FOLLOW UP NOTE  --------------------------------------------------------------------------------  Chief Complaint:    24 hour events/subjective:    patient seen and examined.   appears comfortable    PAST HISTORY  --------------------------------------------------------------------------------  No significant changes to PMH, PSH, FHx, SHx, unless otherwise noted    ALLERGIES & MEDICATIONS  --------------------------------------------------------------------------------  Allergies    No Known Allergies    Intolerances      Standing Inpatient Medications  artificial tears (preservative free) Ophthalmic Solution 1 Drop(s) Both EYES four times a day  dextrose 50% Injectable 25 Gram(s) IV Push once  dextrose 50% Injectable 12.5 Gram(s) IV Push once  glucagon  Injectable 1 milliGRAM(s) IntraMuscular once  influenza  Vaccine (HIGH DOSE) 0.7 milliLiter(s) IntraMuscular once  insulin lispro (ADMELOG) corrective regimen sliding scale   SubCutaneous three times a day before meals  insulin lispro (ADMELOG) corrective regimen sliding scale   SubCutaneous at bedtime  pantoprazole    Tablet 40 milliGRAM(s) Oral before breakfast  phenytoin   Suspension 100 milliGRAM(s) Oral at bedtime  primidone 250 milliGRAM(s) Oral at bedtime  propranolol 20 milliGRAM(s) Oral two times a day  sodium bicarbonate 650 milliGRAM(s) Oral three times a day  sodium chloride 0.9% lock flush 3 milliLiter(s) IV Push every 8 hours    PRN Inpatient Medications  dextrose Oral Gel 15 Gram(s) Oral once PRN  dextrose Oral Gel 15 Gram(s) Oral once PRN      REVIEW OF SYSTEMS  --------------------------------------------------------------------------------  able to communicate limited in english  Gen: denies fevers/chills,  CVS: denies chest pain/palpitations  Resp: denies SOB/Cough  GI: Denies N/V/Abd pain  : Denies dysuria    VITALS/PHYSICAL EXAM  --------------------------------------------------------------------------------  T(C): 36.6 (12-04-23 @ 12:01), Max: 37.1 (12-04-23 @ 00:01)  HR: 108 (12-04-23 @ 12:01) (93 - 108)  BP: 143/69 (12-04-23 @ 12:01) (121/64 - 167/83)  RR: 18 (12-04-23 @ 12:01) (18 - 18)  SpO2: 99% (12-04-23 @ 12:01) (95% - 99%)  Wt(kg): --        12-03-23 @ 07:01  -  12-04-23 @ 07:00  --------------------------------------------------------  IN: 760 mL / OUT: 1700 mL / NET: -940 mL    12-04-23 @ 07:01  -  12-04-23 @ 15:54  --------------------------------------------------------  IN: 480 mL / OUT: 200 mL / NET: 280 mL      Physical Exam:  	  	Gen: Non toxic comfortable appearing  facial ecchymosis and periorbital as well   	Pulm: decrease bs  no rales or ronchi or wheezing  	CV: RRR, S1S2; no rub  	Abd: +BS, soft, nontender/nondistended  	: No suprapubic tenderness  	UE: Warm, no cyanosis  no clubbing,  no edema  	LE: Warm, no cyanosis  no clubbing, no edema  	Neuro: alert and oriented. speech coherent     LABS/STUDIES  --------------------------------------------------------------------------------              8.6    5.81  >-----------<  129      [12-04-23 @ 06:03]              26.7     144  |  113  |  47  ----------------------------<  100      [12-04-23 @ 06:03]  4.5   |  16  |  2.44        Ca     8.3     [12-04-23 @ 06:03]      Mg     2.1     [12-04-23 @ 06:03]      Phos  3.6     [12-04-23 @ 06:03]    TPro  5.8  /  Alb  3.5  /  TBili  0.3  /  DBili  x   /  AST  23  /  ALT  19  /  AlkPhos  64  [12-03-23 @ 07:02]              [12-03-23 @ 07:02]    Creatinine Trend:  SCr 2.44 [12-04 @ 06:03]  SCr 2.00 [12-03 @ 07:02]  SCr 2.25 [12-02 @ 07:15]  SCr 1.89 [12-01 @ 06:47]  SCr 2.50 [11-30 @ 06:30]            Iron 62, TIBC 191, %sat 32      [12-04-23 @ 06:03]  Ferritin 182      [12-04-23 @ 06:04]  TSH 5.30      [11-30-23 @ 06:31]       [facility]   [phone]  RENAL FOLLOW UP NOTE  --------------------------------------------------------------------------------  Chief Complaint:    24 hour events/subjective:    patient seen and examined.   appears comfortable    PAST HISTORY  --------------------------------------------------------------------------------  No significant changes to PMH, PSH, FHx, SHx, unless otherwise noted    ALLERGIES & MEDICATIONS  --------------------------------------------------------------------------------  Allergies    No Known Allergies    Intolerances      Standing Inpatient Medications  artificial tears (preservative free) Ophthalmic Solution 1 Drop(s) Both EYES four times a day  dextrose 50% Injectable 25 Gram(s) IV Push once  dextrose 50% Injectable 12.5 Gram(s) IV Push once  glucagon  Injectable 1 milliGRAM(s) IntraMuscular once  influenza  Vaccine (HIGH DOSE) 0.7 milliLiter(s) IntraMuscular once  insulin lispro (ADMELOG) corrective regimen sliding scale   SubCutaneous three times a day before meals  insulin lispro (ADMELOG) corrective regimen sliding scale   SubCutaneous at bedtime  pantoprazole    Tablet 40 milliGRAM(s) Oral before breakfast  phenytoin   Suspension 100 milliGRAM(s) Oral at bedtime  primidone 250 milliGRAM(s) Oral at bedtime  propranolol 20 milliGRAM(s) Oral two times a day  sodium bicarbonate 650 milliGRAM(s) Oral three times a day  sodium chloride 0.9% lock flush 3 milliLiter(s) IV Push every 8 hours    PRN Inpatient Medications  dextrose Oral Gel 15 Gram(s) Oral once PRN  dextrose Oral Gel 15 Gram(s) Oral once PRN      REVIEW OF SYSTEMS  --------------------------------------------------------------------------------  able to communicate limited in english  Gen: denies fevers/chills,  CVS: denies chest pain/palpitations  Resp: denies SOB/Cough  GI: Denies N/V/Abd pain  : Denies dysuria    VITALS/PHYSICAL EXAM  --------------------------------------------------------------------------------  T(C): 36.6 (12-04-23 @ 12:01), Max: 37.1 (12-04-23 @ 00:01)  HR: 108 (12-04-23 @ 12:01) (93 - 108)  BP: 143/69 (12-04-23 @ 12:01) (121/64 - 167/83)  RR: 18 (12-04-23 @ 12:01) (18 - 18)  SpO2: 99% (12-04-23 @ 12:01) (95% - 99%)  Wt(kg): --        12-03-23 @ 07:01  -  12-04-23 @ 07:00  --------------------------------------------------------  IN: 760 mL / OUT: 1700 mL / NET: -940 mL    12-04-23 @ 07:01  -  12-04-23 @ 15:54  --------------------------------------------------------  IN: 480 mL / OUT: 200 mL / NET: 280 mL      Physical Exam:  	  	Gen: Non toxic comfortable appearing  facial ecchymosis and periorbital as well   	Pulm: decrease bs  no rales or ronchi or wheezing  	CV: RRR, S1S2; no rub  	Abd: +BS, soft, nontender/nondistended  	: No suprapubic tenderness  	UE: Warm, no cyanosis  no clubbing,  no edema  	LE: Warm, no cyanosis  no clubbing, no edema  	Neuro: alert and oriented. speech coherent     LABS/STUDIES  --------------------------------------------------------------------------------              8.6    5.81  >-----------<  129      [12-04-23 @ 06:03]              26.7     144  |  113  |  47  ----------------------------<  100      [12-04-23 @ 06:03]  4.5   |  16  |  2.44        Ca     8.3     [12-04-23 @ 06:03]      Mg     2.1     [12-04-23 @ 06:03]      Phos  3.6     [12-04-23 @ 06:03]    TPro  5.8  /  Alb  3.5  /  TBili  0.3  /  DBili  x   /  AST  23  /  ALT  19  /  AlkPhos  64  [12-03-23 @ 07:02]              [12-03-23 @ 07:02]    Creatinine Trend:  SCr 2.44 [12-04 @ 06:03]  SCr 2.00 [12-03 @ 07:02]  SCr 2.25 [12-02 @ 07:15]  SCr 1.89 [12-01 @ 06:47]  SCr 2.50 [11-30 @ 06:30]            Iron 62, TIBC 191, %sat 32      [12-04-23 @ 06:03]  Ferritin 182      [12-04-23 @ 06:04]  TSH 5.30      [11-30-23 @ 06:31]

## 2023-12-04 NOTE — PROVIDER CONTACT NOTE (OTHER) - ASSESSMENT
Pinpoint rash noted on bilateral inner thighs, bilateral elbows, abdomen, chest, and cheeks. Bright red in color. C/o itchiness
patient asleep but arousable, VSS

## 2023-12-04 NOTE — PROGRESS NOTE ADULT - ASSESSMENT
83 y.o. M PMHx significant for epilepsy, HTN, BPH, hypothyroidism, type a aortic dissection s/p repair in 2019 who presents to the ED s/p fall, admitted to CTU for monitoring s/p labetalol/cardene gtt. Medicine consulted for BP management and tx to medicine service.   Patient is feeling well without any complaints. Currently normotensive without any BP agents. 2d echo normal.  - Would not add any BP agents at this time as SBP remains 100-120s, if trends up add back home meds.    allergic reaction  - unknown culprit  - benadryl prn  - will consider steroid if no improvement    CKD  - nephrology following  - avoid nephrotoxins  - cont bicarb  - d/c iv f     incidental nonspecific rectal wall enhancement on CT  - recommend opt GI referral if within patient's GOC.     anemia  - iron studies c/w ACD  -  ct neg for rp bleed    diabetes  - fs qid  - hgb a1c 5.4  - insulin ss    dispo - PT eval  - Home PT  d/c planning

## 2023-12-04 NOTE — PROGRESS NOTE ADULT - SUBJECTIVE AND OBJECTIVE BOX
DATE OF SERVICE: 12-04-23 @ 13:19    Patient is a 83y old  Male who presents with a chief complaint of s/p fall (03 Dec 2023 19:17)      SUBJECTIVE / OVERNIGHT EVENTS:  No chest pain. No shortness of breath. pt having an allergic reaction with diffuse rash and itching    MEDICATIONS  (STANDING):  artificial tears (preservative free) Ophthalmic Solution 1 Drop(s) Both EYES four times a day  dextrose 50% Injectable 25 Gram(s) IV Push once  dextrose 50% Injectable 12.5 Gram(s) IV Push once  glucagon  Injectable 1 milliGRAM(s) IntraMuscular once  influenza  Vaccine (HIGH DOSE) 0.7 milliLiter(s) IntraMuscular once  insulin lispro (ADMELOG) corrective regimen sliding scale   SubCutaneous three times a day before meals  insulin lispro (ADMELOG) corrective regimen sliding scale   SubCutaneous at bedtime  pantoprazole    Tablet 40 milliGRAM(s) Oral before breakfast  phenytoin   Suspension 100 milliGRAM(s) Oral at bedtime  primidone 250 milliGRAM(s) Oral at bedtime  sodium bicarbonate 650 milliGRAM(s) Oral three times a day  sodium chloride 0.9% lock flush 3 milliLiter(s) IV Push every 8 hours    MEDICATIONS  (PRN):  dextrose Oral Gel 15 Gram(s) Oral once PRN Blood Glucose LESS THAN 70 milliGRAM(s)/deciliter  dextrose Oral Gel 15 Gram(s) Oral once PRN Blood Glucose LESS THAN 70 milliGRAM(s)/deciliter      Vital Signs Last 24 Hrs  T(C): 36.6 (04 Dec 2023 12:01), Max: 37.1 (04 Dec 2023 00:01)  T(F): 97.8 (04 Dec 2023 12:01), Max: 98.7 (04 Dec 2023 00:01)  HR: 108 (04 Dec 2023 12:01) (93 - 108)  BP: 143/69 (04 Dec 2023 12:01) (121/64 - 167/83)  BP(mean): --  RR: 18 (04 Dec 2023 12:01) (18 - 18)  SpO2: 99% (04 Dec 2023 12:01) (95% - 99%)    Parameters below as of 04 Dec 2023 12:01  Patient On (Oxygen Delivery Method): room air      CAPILLARY BLOOD GLUCOSE      POCT Blood Glucose.: 116 mg/dL (04 Dec 2023 12:46)  POCT Blood Glucose.: 95 mg/dL (04 Dec 2023 09:07)  POCT Blood Glucose.: 135 mg/dL (03 Dec 2023 21:21)  POCT Blood Glucose.: 119 mg/dL (03 Dec 2023 17:18)    I&O's Summary    03 Dec 2023 07:01  -  04 Dec 2023 07:00  --------------------------------------------------------  IN: 760 mL / OUT: 1700 mL / NET: -940 mL        PHYSICAL EXAM:  GENERAL: NAD, well-developed  HEAD:  Atraumatic, Normocephalic  EYES: EOMI, PERRLA, conjunctiva and sclera clear  NECK: Supple, No JVD  CHEST/LUNG: Clear to auscultation bilaterally; No wheeze  HEART: Regular rate and rhythm; No murmurs, rubs, or gallops  ABDOMEN: Soft, Nontender, Nondistended; Bowel sounds present  EXTREMITIES:  2+ Peripheral Pulses, No clubbing, cyanosis, or edema  PSYCH: AAOx3  NEUROLOGY: non-focal  SKIN: No rashes or lesions    LABS:                        8.6    5.81  )-----------( 129      ( 04 Dec 2023 06:03 )             26.7     12-04    144  |  113<H>  |  47<H>  ----------------------------<  100<H>  4.5   |  16<L>  |  2.44<H>    Ca    8.3<L>      04 Dec 2023 06:03  Phos  3.6     12-04  Mg     2.1     12-04    TPro  5.8<L>  /  Alb  3.5  /  TBili  0.3  /  DBili  x   /  AST  23  /  ALT  19  /  AlkPhos  64  12-03      CARDIAC MARKERS ( 03 Dec 2023 07:02 )  x     / x     / 248 U/L / x     / x          Urinalysis Basic - ( 04 Dec 2023 06:03 )    Color: x / Appearance: x / SG: x / pH: x  Gluc: 100 mg/dL / Ketone: x  / Bili: x / Urobili: x   Blood: x / Protein: x / Nitrite: x   Leuk Esterase: x / RBC: x / WBC x   Sq Epi: x / Non Sq Epi: x / Bacteria: x        RADIOLOGY & ADDITIONAL TESTS:    Imaging Personally Reviewed:    Consultant(s) Notes Reviewed:      Care Discussed with Consultants/Other Providers:

## 2023-12-04 NOTE — PROGRESS NOTE ADULT - NS ATTEND AMEND GEN_ALL_CORE FT
Seen, examined with, formulated plan with and  agree with above as scribed by NP Osvaldo [Debbi]     + facial ecchymosis   lungs no rales   heart RRR  ext no edema     ckd III cr higher today   trend cr   htn   bp is high restart propranolol 20 mg bid  if cr baseline will restart losartan as well   goal sbp < 120 mmHg
no

## 2023-12-05 LAB
ANION GAP SERPL CALC-SCNC: 15 MMOL/L — SIGNIFICANT CHANGE UP (ref 5–17)
ANION GAP SERPL CALC-SCNC: 15 MMOL/L — SIGNIFICANT CHANGE UP (ref 5–17)
BASOPHILS # BLD AUTO: 0.01 K/UL — SIGNIFICANT CHANGE UP (ref 0–0.2)
BASOPHILS # BLD AUTO: 0.01 K/UL — SIGNIFICANT CHANGE UP (ref 0–0.2)
BASOPHILS NFR BLD AUTO: 0.2 % — SIGNIFICANT CHANGE UP (ref 0–2)
BASOPHILS NFR BLD AUTO: 0.2 % — SIGNIFICANT CHANGE UP (ref 0–2)
BUN SERPL-MCNC: 52 MG/DL — HIGH (ref 7–23)
BUN SERPL-MCNC: 52 MG/DL — HIGH (ref 7–23)
CALCIUM SERPL-MCNC: 8.4 MG/DL — SIGNIFICANT CHANGE UP (ref 8.4–10.5)
CALCIUM SERPL-MCNC: 8.4 MG/DL — SIGNIFICANT CHANGE UP (ref 8.4–10.5)
CHLORIDE SERPL-SCNC: 114 MMOL/L — HIGH (ref 96–108)
CHLORIDE SERPL-SCNC: 114 MMOL/L — HIGH (ref 96–108)
CO2 SERPL-SCNC: 15 MMOL/L — LOW (ref 22–31)
CO2 SERPL-SCNC: 15 MMOL/L — LOW (ref 22–31)
CREAT SERPL-MCNC: 2.35 MG/DL — HIGH (ref 0.5–1.3)
CREAT SERPL-MCNC: 2.35 MG/DL — HIGH (ref 0.5–1.3)
EGFR: 27 ML/MIN/1.73M2 — LOW
EGFR: 27 ML/MIN/1.73M2 — LOW
EOSINOPHIL # BLD AUTO: 0.59 K/UL — HIGH (ref 0–0.5)
EOSINOPHIL # BLD AUTO: 0.59 K/UL — HIGH (ref 0–0.5)
EOSINOPHIL NFR BLD AUTO: 9.6 % — HIGH (ref 0–6)
EOSINOPHIL NFR BLD AUTO: 9.6 % — HIGH (ref 0–6)
GLUCOSE BLDC GLUCOMTR-MCNC: 107 MG/DL — HIGH (ref 70–99)
GLUCOSE BLDC GLUCOMTR-MCNC: 107 MG/DL — HIGH (ref 70–99)
GLUCOSE BLDC GLUCOMTR-MCNC: 115 MG/DL — HIGH (ref 70–99)
GLUCOSE BLDC GLUCOMTR-MCNC: 115 MG/DL — HIGH (ref 70–99)
GLUCOSE BLDC GLUCOMTR-MCNC: 124 MG/DL — HIGH (ref 70–99)
GLUCOSE BLDC GLUCOMTR-MCNC: 124 MG/DL — HIGH (ref 70–99)
GLUCOSE BLDC GLUCOMTR-MCNC: 130 MG/DL — HIGH (ref 70–99)
GLUCOSE BLDC GLUCOMTR-MCNC: 130 MG/DL — HIGH (ref 70–99)
GLUCOSE SERPL-MCNC: 101 MG/DL — HIGH (ref 70–99)
GLUCOSE SERPL-MCNC: 101 MG/DL — HIGH (ref 70–99)
HCT VFR BLD CALC: 29.7 % — LOW (ref 39–50)
HCT VFR BLD CALC: 29.7 % — LOW (ref 39–50)
HGB BLD-MCNC: 9.3 G/DL — LOW (ref 13–17)
HGB BLD-MCNC: 9.3 G/DL — LOW (ref 13–17)
IMM GRANULOCYTES NFR BLD AUTO: 0.5 % — SIGNIFICANT CHANGE UP (ref 0–0.9)
IMM GRANULOCYTES NFR BLD AUTO: 0.5 % — SIGNIFICANT CHANGE UP (ref 0–0.9)
LYMPHOCYTES # BLD AUTO: 0.78 K/UL — LOW (ref 1–3.3)
LYMPHOCYTES # BLD AUTO: 0.78 K/UL — LOW (ref 1–3.3)
LYMPHOCYTES # BLD AUTO: 12.7 % — LOW (ref 13–44)
LYMPHOCYTES # BLD AUTO: 12.7 % — LOW (ref 13–44)
MCHC RBC-ENTMCNC: 31.3 GM/DL — LOW (ref 32–36)
MCHC RBC-ENTMCNC: 31.3 GM/DL — LOW (ref 32–36)
MCHC RBC-ENTMCNC: 32.2 PG — SIGNIFICANT CHANGE UP (ref 27–34)
MCHC RBC-ENTMCNC: 32.2 PG — SIGNIFICANT CHANGE UP (ref 27–34)
MCV RBC AUTO: 102.8 FL — HIGH (ref 80–100)
MCV RBC AUTO: 102.8 FL — HIGH (ref 80–100)
MONOCYTES # BLD AUTO: 0.55 K/UL — SIGNIFICANT CHANGE UP (ref 0–0.9)
MONOCYTES # BLD AUTO: 0.55 K/UL — SIGNIFICANT CHANGE UP (ref 0–0.9)
MONOCYTES NFR BLD AUTO: 8.9 % — SIGNIFICANT CHANGE UP (ref 2–14)
MONOCYTES NFR BLD AUTO: 8.9 % — SIGNIFICANT CHANGE UP (ref 2–14)
NEUTROPHILS # BLD AUTO: 4.19 K/UL — SIGNIFICANT CHANGE UP (ref 1.8–7.4)
NEUTROPHILS # BLD AUTO: 4.19 K/UL — SIGNIFICANT CHANGE UP (ref 1.8–7.4)
NEUTROPHILS NFR BLD AUTO: 68.1 % — SIGNIFICANT CHANGE UP (ref 43–77)
NEUTROPHILS NFR BLD AUTO: 68.1 % — SIGNIFICANT CHANGE UP (ref 43–77)
NRBC # BLD: 0 /100 WBCS — SIGNIFICANT CHANGE UP (ref 0–0)
NRBC # BLD: 0 /100 WBCS — SIGNIFICANT CHANGE UP (ref 0–0)
PLATELET # BLD AUTO: 132 K/UL — LOW (ref 150–400)
PLATELET # BLD AUTO: 132 K/UL — LOW (ref 150–400)
POTASSIUM SERPL-MCNC: 4.3 MMOL/L — SIGNIFICANT CHANGE UP (ref 3.5–5.3)
POTASSIUM SERPL-MCNC: 4.3 MMOL/L — SIGNIFICANT CHANGE UP (ref 3.5–5.3)
POTASSIUM SERPL-SCNC: 4.3 MMOL/L — SIGNIFICANT CHANGE UP (ref 3.5–5.3)
POTASSIUM SERPL-SCNC: 4.3 MMOL/L — SIGNIFICANT CHANGE UP (ref 3.5–5.3)
RBC # BLD: 2.89 M/UL — LOW (ref 4.2–5.8)
RBC # BLD: 2.89 M/UL — LOW (ref 4.2–5.8)
RBC # FLD: 14.1 % — SIGNIFICANT CHANGE UP (ref 10.3–14.5)
RBC # FLD: 14.1 % — SIGNIFICANT CHANGE UP (ref 10.3–14.5)
SODIUM SERPL-SCNC: 144 MMOL/L — SIGNIFICANT CHANGE UP (ref 135–145)
SODIUM SERPL-SCNC: 144 MMOL/L — SIGNIFICANT CHANGE UP (ref 135–145)
WBC # BLD: 5.92 K/UL — SIGNIFICANT CHANGE UP (ref 3.8–10.5)
WBC # BLD: 5.92 K/UL — SIGNIFICANT CHANGE UP (ref 3.8–10.5)
WBC # FLD AUTO: 5.92 K/UL — SIGNIFICANT CHANGE UP (ref 3.8–10.5)
WBC # FLD AUTO: 5.92 K/UL — SIGNIFICANT CHANGE UP (ref 3.8–10.5)

## 2023-12-05 RX ORDER — DIPHENHYDRAMINE HCL 50 MG
1 CAPSULE ORAL
Qty: 16 | Refills: 0
Start: 2023-12-05 | End: 2023-12-08

## 2023-12-05 RX ORDER — SODIUM BICARBONATE 1 MEQ/ML
1300 SYRINGE (ML) INTRAVENOUS THREE TIMES A DAY
Refills: 0 | Status: DISCONTINUED | OUTPATIENT
Start: 2023-12-05 | End: 2023-12-08

## 2023-12-05 RX ADMIN — PRIMIDONE 250 MILLIGRAM(S): 250 TABLET ORAL at 21:27

## 2023-12-05 RX ADMIN — SODIUM CHLORIDE 3 MILLILITER(S): 9 INJECTION INTRAMUSCULAR; INTRAVENOUS; SUBCUTANEOUS at 21:46

## 2023-12-05 RX ADMIN — Medication 1300 MILLIGRAM(S): at 21:27

## 2023-12-05 RX ADMIN — SODIUM CHLORIDE 3 MILLILITER(S): 9 INJECTION INTRAMUSCULAR; INTRAVENOUS; SUBCUTANEOUS at 06:25

## 2023-12-05 RX ADMIN — SODIUM CHLORIDE 3 MILLILITER(S): 9 INJECTION INTRAMUSCULAR; INTRAVENOUS; SUBCUTANEOUS at 13:27

## 2023-12-05 RX ADMIN — Medication 1 DROP(S): at 23:11

## 2023-12-05 RX ADMIN — Medication 650 MILLIGRAM(S): at 05:04

## 2023-12-05 RX ADMIN — PANTOPRAZOLE SODIUM 40 MILLIGRAM(S): 20 TABLET, DELAYED RELEASE ORAL at 05:03

## 2023-12-05 RX ADMIN — Medication 1 DROP(S): at 05:03

## 2023-12-05 RX ADMIN — Medication 1 DROP(S): at 17:33

## 2023-12-05 RX ADMIN — Medication 1300 MILLIGRAM(S): at 17:32

## 2023-12-05 RX ADMIN — Medication 1 DROP(S): at 12:00

## 2023-12-05 RX ADMIN — Medication 100 MILLIGRAM(S): at 21:26

## 2023-12-05 NOTE — PROGRESS NOTE ADULT - ASSESSMENT
Collins is an 83 y.o. M PMHx significant for epilepsy, HTN, BPH, hypothyroidism, who presents to the ED s/p fall to pavement .  Patient had direct impact to left side of face presents with severe ecchymosis of the left eye which is closed shut at this time.  Son who accompanies patient and translates as patient is non-English speaking, states patient was walking with a umbrella 5 steps in front of him.  Umbrella slipped causing him to fall to the floor.  Patient's son states prior to fall patient was not lightheaded or dizzy. He denies LOC after fall.  Patient did not appear to be confused after fall.  Additionally, c.o. pain to the L. knee which has a superficial knee abrasion. noticed with abn creatinine.      1- CKD III based on previous blood work in the system when reviewed  2- HTN   3- syncope   4- anemia     creatinine is steady  unclear creatinine baseline, patient does not recall baseline creatinine. attempted to call patient outpatient provider listed, unable to get through x 2.  anemia, trend hgb  acidosis, bicarb continues to worsen  increase sodium bicarbonate to 1300 mg po tid   lactic acid and bet hydroxy butyrate  in range   cpk in range   also with worsening generalized rash, differential added to cbc ?AIN  recent ct a/p without hydro  strict I/O  trend creatinine and electrolytes daily

## 2023-12-05 NOTE — PROGRESS NOTE ADULT - SUBJECTIVE AND OBJECTIVE BOX
DATE OF SERVICE: 12-05-23 @ 12:28    Patient is a 83y old  Male who presents with a chief complaint of s/p fall (04 Dec 2023 15:53)      SUBJECTIVE / OVERNIGHT EVENTS:  slight improvement in allergic reaction    MEDICATIONS  (STANDING):  artificial tears (preservative free) Ophthalmic Solution 1 Drop(s) Both EYES four times a day  dextrose 50% Injectable 25 Gram(s) IV Push once  dextrose 50% Injectable 12.5 Gram(s) IV Push once  glucagon  Injectable 1 milliGRAM(s) IntraMuscular once  influenza  Vaccine (HIGH DOSE) 0.7 milliLiter(s) IntraMuscular once  insulin lispro (ADMELOG) corrective regimen sliding scale   SubCutaneous three times a day before meals  insulin lispro (ADMELOG) corrective regimen sliding scale   SubCutaneous at bedtime  pantoprazole    Tablet 40 milliGRAM(s) Oral before breakfast  phenytoin   Suspension 100 milliGRAM(s) Oral at bedtime  primidone 250 milliGRAM(s) Oral at bedtime  propranolol 20 milliGRAM(s) Oral two times a day  sodium bicarbonate 650 milliGRAM(s) Oral three times a day  sodium chloride 0.9% lock flush 3 milliLiter(s) IV Push every 8 hours    MEDICATIONS  (PRN):  dextrose Oral Gel 15 Gram(s) Oral once PRN Blood Glucose LESS THAN 70 milliGRAM(s)/deciliter  dextrose Oral Gel 15 Gram(s) Oral once PRN Blood Glucose LESS THAN 70 milliGRAM(s)/deciliter  diphenhydrAMINE 25 milliGRAM(s) Oral every 6 hours PRN Rash and/or Itching      Vital Signs Last 24 Hrs  T(C): 36.9 (05 Dec 2023 12:16), Max: 37.4 (04 Dec 2023 21:27)  T(F): 98.5 (05 Dec 2023 12:16), Max: 99.4 (04 Dec 2023 21:27)  HR: 93 (05 Dec 2023 12:16) (83 - 94)  BP: 118/68 (05 Dec 2023 12:16) (118/68 - 159/80)  BP(mean): --  RR: 18 (05 Dec 2023 12:16) (18 - 18)  SpO2: 100% (05 Dec 2023 12:16) (97% - 100%)    Parameters below as of 05 Dec 2023 12:16  Patient On (Oxygen Delivery Method): room air      CAPILLARY BLOOD GLUCOSE      POCT Blood Glucose.: 115 mg/dL (05 Dec 2023 08:58)  POCT Blood Glucose.: 133 mg/dL (04 Dec 2023 21:33)  POCT Blood Glucose.: 121 mg/dL (04 Dec 2023 17:28)  POCT Blood Glucose.: 116 mg/dL (04 Dec 2023 12:46)    I&O's Summary    04 Dec 2023 07:01  -  05 Dec 2023 07:00  --------------------------------------------------------  IN: 480 mL / OUT: 400 mL / NET: 80 mL    05 Dec 2023 07:01  -  05 Dec 2023 12:28  --------------------------------------------------------  IN: 360 mL / OUT: 0 mL / NET: 360 mL        PHYSICAL EXAM:  GENERAL: NAD, well-developed  HEAD:  Atraumatic, Normocephalic  EYES: EOMI, PERRLA, conjunctiva and sclera clear  NECK: Supple, No JVD  CHEST/LUNG: Clear to auscultation bilaterally; No wheeze  HEART: Regular rate and rhythm; No murmurs, rubs, or gallops  ABDOMEN: Soft, Nontender, Nondistended; Bowel sounds present  EXTREMITIES:  2+ Peripheral Pulses, No clubbing, cyanosis, or edema  PSYCH: AAOx3  NEUROLOGY: non-focal  SKIN: diffuse rash    LABS:                        9.3    5.92  )-----------( 132      ( 05 Dec 2023 07:11 )             29.7     12-05    144  |  114<H>  |  52<H>  ----------------------------<  101<H>  4.3   |  15<L>  |  2.35<H>    Ca    8.4      05 Dec 2023 07:11  Phos  3.6     12-04  Mg     2.1     12-04            Urinalysis Basic - ( 05 Dec 2023 07:11 )    Color: x / Appearance: x / SG: x / pH: x  Gluc: 101 mg/dL / Ketone: x  / Bili: x / Urobili: x   Blood: x / Protein: x / Nitrite: x   Leuk Esterase: x / RBC: x / WBC x   Sq Epi: x / Non Sq Epi: x / Bacteria: x        RADIOLOGY & ADDITIONAL TESTS:    Imaging Personally Reviewed:    Consultant(s) Notes Reviewed:      Care Discussed with Consultants/Other Providers:

## 2023-12-05 NOTE — PROGRESS NOTE ADULT - SUBJECTIVE AND OBJECTIVE BOX
[facility]   [phone]  RENAL FOLLOW UP NOTE  --------------------------------------------------------------------------------  Chief Complaint:    24 hour events/subjective:    patient seen and examined.    ID [id number]  nancyigor sob  denigor diarrhea    PAST HISTORY  --------------------------------------------------------------------------------  No significant changes to PMH, PSH, FHx, SHx, unless otherwise noted    ALLERGIES & MEDICATIONS  --------------------------------------------------------------------------------  Allergies    No Known Allergies    Intolerances      Standing Inpatient Medications  artificial tears (preservative free) Ophthalmic Solution 1 Drop(s) Both EYES four times a day  dextrose 50% Injectable 25 Gram(s) IV Push once  dextrose 50% Injectable 12.5 Gram(s) IV Push once  glucagon  Injectable 1 milliGRAM(s) IntraMuscular once  influenza  Vaccine (HIGH DOSE) 0.7 milliLiter(s) IntraMuscular once  insulin lispro (ADMELOG) corrective regimen sliding scale   SubCutaneous three times a day before meals  insulin lispro (ADMELOG) corrective regimen sliding scale   SubCutaneous at bedtime  pantoprazole    Tablet 40 milliGRAM(s) Oral before breakfast  phenytoin   Suspension 100 milliGRAM(s) Oral at bedtime  primidone 250 milliGRAM(s) Oral at bedtime  propranolol 20 milliGRAM(s) Oral two times a day  sodium bicarbonate 650 milliGRAM(s) Oral three times a day  sodium chloride 0.9% lock flush 3 milliLiter(s) IV Push every 8 hours    PRN Inpatient Medications  dextrose Oral Gel 15 Gram(s) Oral once PRN  dextrose Oral Gel 15 Gram(s) Oral once PRN  diphenhydrAMINE 25 milliGRAM(s) Oral every 6 hours PRN      REVIEW OF SYSTEMS  --------------------------------------------------------------------------------    Gen: denies fevers/chills,  CVS: denies chest pain/palpitations  Resp: denies SOB/Cough  GI: Denies N/V/Abd pain  : Denies dysuria    VITALS/PHYSICAL EXAM  --------------------------------------------------------------------------------  T(C): 36.9 (12-05-23 @ 12:16), Max: 37.4 (12-04-23 @ 21:27)  HR: 93 (12-05-23 @ 12:16) (83 - 94)  BP: 118/68 (12-05-23 @ 12:16) (118/68 - 159/80)  RR: 18 (12-05-23 @ 12:16) (18 - 18)  SpO2: 100% (12-05-23 @ 12:16) (97% - 100%)  Wt(kg): --        12-04-23 @ 07:01  -  12-05-23 @ 07:00  --------------------------------------------------------  IN: 480 mL / OUT: 400 mL / NET: 80 mL    12-05-23 @ 07:01  -  12-05-23 @ 13:06  --------------------------------------------------------  IN: 360 mL / OUT: 0 mL / NET: 360 mL      Physical Exam:  	  	Gen: Non toxic comfortable appearing  facial ecchymosis and periorbital as well   	Pulm: decrease bs  no rales or ronchi or wheezing  	CV: RRR, S1S2; no rub  	Abd: +BS, soft, nontender/nondistended  	: No suprapubic tenderness  	UE: Warm, no cyanosis  no clubbing,  no edema  	LE: Warm, no cyanosis  no clubbing, no edema  	Neuro: alert and oriented. speech coherent              Skin: generalized rash, inner thigh, chest, abdomen, back and arms     LABS/STUDIES  --------------------------------------------------------------------------------              9.3    5.92  >-----------<  132      [12-05-23 @ 07:11]              29.7     144  |  114  |  52  ----------------------------<  101      [12-05-23 @ 07:11]  4.3   |  15  |  2.35        Ca     8.4     [12-05-23 @ 07:11]      Mg     2.1     [12-04-23 @ 06:03]      Phos  3.6     [12-04-23 @ 06:03]            Creatinine Trend:  SCr 2.35 [12-05 @ 07:11]  SCr 2.44 [12-04 @ 06:03]  SCr 2.00 [12-03 @ 07:02]  SCr 2.25 [12-02 @ 07:15]  SCr 1.89 [12-01 @ 06:47]          Urinalysis (12.04.23 @ 18:47)   Glucose Qualitative, Urine: Negative mg/dL  Blood, Urine: Small  pH Urine: 6.0  Color: Yellow  Urine Appearance: Clear  Bilirubin: Negative  Ketone - Urine: Negative mg/dL  Specific Gravity: 1.015  Protein, Urine: 30 mg/dL  Urobilinogen: 0.2 mg/dL  Nitrite: Negative  Leukocyte Esterase Concentration: Negative    Urine Creatinine 73      [12-04-23 @ 18:43]  Urine Protein 28      [12-04-23 @ 18:43]    Iron 62, TIBC 191, %sat 32      [12-04-23 @ 06:03]  Protein/Creatinine Ratio, Urine (12.04.23 @ 18:43)   Creatinine, Random Urine: 73: Reference Ranges have NOT been established for random urine analytes due   to variability in fluid intake and concentration. mg/dL  Total Protein, Random Urine: 28 mg/dL  Protein/Creatinine Ratio Calculation: 0.4 RatioFerritin 182      [12-04-23 @ 06:04]      TSH 5.30      [11-30-23 @ 06:31]       [facility]   [phone]  RENAL FOLLOW UP NOTE  --------------------------------------------------------------------------------  Chief Complaint:    24 hour events/subjective:    patient seen and examined.    ID [id number]  nancyigor sob  denigor diarrhea    PAST HISTORY  --------------------------------------------------------------------------------  No significant changes to PMH, PSH, FHx, SHx, unless otherwise noted    ALLERGIES & MEDICATIONS  --------------------------------------------------------------------------------  Allergies    No Known Allergies    Intolerances      Standing Inpatient Medications  artificial tears (preservative free) Ophthalmic Solution 1 Drop(s) Both EYES four times a day  dextrose 50% Injectable 25 Gram(s) IV Push once  dextrose 50% Injectable 12.5 Gram(s) IV Push once  glucagon  Injectable 1 milliGRAM(s) IntraMuscular once  influenza  Vaccine (HIGH DOSE) 0.7 milliLiter(s) IntraMuscular once  insulin lispro (ADMELOG) corrective regimen sliding scale   SubCutaneous three times a day before meals  insulin lispro (ADMELOG) corrective regimen sliding scale   SubCutaneous at bedtime  pantoprazole    Tablet 40 milliGRAM(s) Oral before breakfast  phenytoin   Suspension 100 milliGRAM(s) Oral at bedtime  primidone 250 milliGRAM(s) Oral at bedtime  propranolol 20 milliGRAM(s) Oral two times a day  sodium bicarbonate 650 milliGRAM(s) Oral three times a day  sodium chloride 0.9% lock flush 3 milliLiter(s) IV Push every 8 hours    PRN Inpatient Medications  dextrose Oral Gel 15 Gram(s) Oral once PRN  dextrose Oral Gel 15 Gram(s) Oral once PRN  diphenhydrAMINE 25 milliGRAM(s) Oral every 6 hours PRN      REVIEW OF SYSTEMS  --------------------------------------------------------------------------------    Gen: denies fevers/chills,  CVS: denies chest pain/palpitations  Resp: denies SOB/Cough  GI: Denies N/V/Abd pain  : Denies dysuria    VITALS/PHYSICAL EXAM  --------------------------------------------------------------------------------  T(C): 36.9 (12-05-23 @ 12:16), Max: 37.4 (12-04-23 @ 21:27)  HR: 93 (12-05-23 @ 12:16) (83 - 94)  BP: 118/68 (12-05-23 @ 12:16) (118/68 - 159/80)  RR: 18 (12-05-23 @ 12:16) (18 - 18)  SpO2: 100% (12-05-23 @ 12:16) (97% - 100%)  Wt(kg): --        12-04-23 @ 07:01  -  12-05-23 @ 07:00  --------------------------------------------------------  IN: 480 mL / OUT: 400 mL / NET: 80 mL    12-05-23 @ 07:01  -  12-05-23 @ 13:06  --------------------------------------------------------  IN: 360 mL / OUT: 0 mL / NET: 360 mL      Physical Exam:  	  	Gen: Non toxic comfortable appearing  facial ecchymosis and periorbital as well   	Pulm: decrease bs  no rales or ronchi or wheezing  	CV: RRR, S1S2; no rub  	Abd: +BS, soft, nontender/nondistended  	: No suprapubic tenderness  	UE: Warm, no cyanosis  no clubbing,  no edema  	LE: Warm, no cyanosis  no clubbing, no edema  	Neuro: alert and oriented. speech coherent              Skin: generalized rash, inner thigh, chest, abdomen, back and arms     LABS/STUDIES  --------------------------------------------------------------------------------              9.3    5.92  >-----------<  132      [12-05-23 @ 07:11]              29.7     144  |  114  |  52  ----------------------------<  101      [12-05-23 @ 07:11]  4.3   |  15  |  2.35        Ca     8.4     [12-05-23 @ 07:11]      Mg     2.1     [12-04-23 @ 06:03]      Phos  3.6     [12-04-23 @ 06:03]            Creatinine Trend:  SCr 2.35 [12-05 @ 07:11]  SCr 2.44 [12-04 @ 06:03]  SCr 2.00 [12-03 @ 07:02]  SCr 2.25 [12-02 @ 07:15]  SCr 1.89 [12-01 @ 06:47]          Urinalysis (12.04.23 @ 18:47)   Glucose Qualitative, Urine: Negative mg/dL  Blood, Urine: Small  pH Urine: 6.0  Color: Yellow  Urine Appearance: Clear  Bilirubin: Negative  Ketone - Urine: Negative mg/dL  Specific Gravity: 1.015  Protein, Urine: 30 mg/dL  Urobilinogen: 0.2 mg/dL  Nitrite: Negative  Leukocyte Esterase Concentration: Negative    Urine Creatinine 73      [12-04-23 @ 18:43]  Urine Protein 28      [12-04-23 @ 18:43]    Iron 62, TIBC 191, %sat 32      [12-04-23 @ 06:03]  Protein/Creatinine Ratio, Urine (12.04.23 @ 18:43)   Creatinine, Random Urine: 73: Reference Ranges have NOT been established for random urine analytes due   to variability in fluid intake and concentration. mg/dL  Total Protein, Random Urine: 28 mg/dL  Protein/Creatinine Ratio Calculation: 0.4 RatioFerritin 182      [12-04-23 @ 06:04]      TSH 5.30      [11-30-23 @ 06:31]

## 2023-12-05 NOTE — PROGRESS NOTE ADULT - ASSESSMENT
83 y.o. M PMHx significant for epilepsy, HTN, BPH, hypothyroidism, type a aortic dissection s/p repair in 2019 who presents to the ED s/p fall, admitted to CTU for monitoring s/p labetalol/cardene gtt. Medicine consulted for BP management and tx to medicine service.   Patient is feeling well without any complaints. Currently normotensive without any BP agents. 2d echo normal.  - Would not add any BP agents at this time as SBP remains 100-120s, if trends up add back home meds.    allergic reaction  - unknown culprit  - benadryl prn  - prednisone 20 qd x 5 days    CKD  - nephrology following  - avoid nephrotoxins  - cont bicarb  - d/c iv f     incidental nonspecific rectal wall enhancement on CT  - recommend opt GI referral if within patient's GOC.     anemia  - iron studies c/w ACD  -  ct neg for rp bleed    diabetes  - fs qid  - hgb a1c 5.4  - insulin ss    dispo - PT eval  - Home PT  d/c planning

## 2023-12-06 LAB
ANION GAP SERPL CALC-SCNC: 15 MMOL/L — SIGNIFICANT CHANGE UP (ref 5–17)
ANION GAP SERPL CALC-SCNC: 15 MMOL/L — SIGNIFICANT CHANGE UP (ref 5–17)
BUN SERPL-MCNC: 56 MG/DL — HIGH (ref 7–23)
BUN SERPL-MCNC: 56 MG/DL — HIGH (ref 7–23)
CALCIUM SERPL-MCNC: 8.2 MG/DL — LOW (ref 8.4–10.5)
CALCIUM SERPL-MCNC: 8.2 MG/DL — LOW (ref 8.4–10.5)
CHLORIDE SERPL-SCNC: 109 MMOL/L — HIGH (ref 96–108)
CHLORIDE SERPL-SCNC: 109 MMOL/L — HIGH (ref 96–108)
CO2 SERPL-SCNC: 16 MMOL/L — LOW (ref 22–31)
CO2 SERPL-SCNC: 16 MMOL/L — LOW (ref 22–31)
CREAT SERPL-MCNC: 2.34 MG/DL — HIGH (ref 0.5–1.3)
CREAT SERPL-MCNC: 2.34 MG/DL — HIGH (ref 0.5–1.3)
EGFR: 27 ML/MIN/1.73M2 — LOW
EGFR: 27 ML/MIN/1.73M2 — LOW
GLUCOSE BLDC GLUCOMTR-MCNC: 103 MG/DL — HIGH (ref 70–99)
GLUCOSE BLDC GLUCOMTR-MCNC: 103 MG/DL — HIGH (ref 70–99)
GLUCOSE BLDC GLUCOMTR-MCNC: 126 MG/DL — HIGH (ref 70–99)
GLUCOSE BLDC GLUCOMTR-MCNC: 126 MG/DL — HIGH (ref 70–99)
GLUCOSE BLDC GLUCOMTR-MCNC: 128 MG/DL — HIGH (ref 70–99)
GLUCOSE BLDC GLUCOMTR-MCNC: 128 MG/DL — HIGH (ref 70–99)
GLUCOSE BLDC GLUCOMTR-MCNC: 154 MG/DL — HIGH (ref 70–99)
GLUCOSE BLDC GLUCOMTR-MCNC: 154 MG/DL — HIGH (ref 70–99)
GLUCOSE SERPL-MCNC: 82 MG/DL — SIGNIFICANT CHANGE UP (ref 70–99)
GLUCOSE SERPL-MCNC: 82 MG/DL — SIGNIFICANT CHANGE UP (ref 70–99)
HCT VFR BLD CALC: 31 % — LOW (ref 39–50)
HCT VFR BLD CALC: 31 % — LOW (ref 39–50)
HGB BLD-MCNC: 9.8 G/DL — LOW (ref 13–17)
HGB BLD-MCNC: 9.8 G/DL — LOW (ref 13–17)
MAGNESIUM SERPL-MCNC: 2 MG/DL — SIGNIFICANT CHANGE UP (ref 1.6–2.6)
MAGNESIUM SERPL-MCNC: 2 MG/DL — SIGNIFICANT CHANGE UP (ref 1.6–2.6)
MCHC RBC-ENTMCNC: 31.6 GM/DL — LOW (ref 32–36)
MCHC RBC-ENTMCNC: 31.6 GM/DL — LOW (ref 32–36)
MCHC RBC-ENTMCNC: 32.5 PG — SIGNIFICANT CHANGE UP (ref 27–34)
MCHC RBC-ENTMCNC: 32.5 PG — SIGNIFICANT CHANGE UP (ref 27–34)
MCV RBC AUTO: 102.6 FL — HIGH (ref 80–100)
MCV RBC AUTO: 102.6 FL — HIGH (ref 80–100)
NRBC # BLD: 0 /100 WBCS — SIGNIFICANT CHANGE UP (ref 0–0)
NRBC # BLD: 0 /100 WBCS — SIGNIFICANT CHANGE UP (ref 0–0)
PHOSPHATE SERPL-MCNC: 3.7 MG/DL — SIGNIFICANT CHANGE UP (ref 2.5–4.5)
PHOSPHATE SERPL-MCNC: 3.7 MG/DL — SIGNIFICANT CHANGE UP (ref 2.5–4.5)
PLATELET # BLD AUTO: 144 K/UL — LOW (ref 150–400)
PLATELET # BLD AUTO: 144 K/UL — LOW (ref 150–400)
POTASSIUM SERPL-MCNC: 4.3 MMOL/L — SIGNIFICANT CHANGE UP (ref 3.5–5.3)
POTASSIUM SERPL-MCNC: 4.3 MMOL/L — SIGNIFICANT CHANGE UP (ref 3.5–5.3)
POTASSIUM SERPL-SCNC: 4.3 MMOL/L — SIGNIFICANT CHANGE UP (ref 3.5–5.3)
POTASSIUM SERPL-SCNC: 4.3 MMOL/L — SIGNIFICANT CHANGE UP (ref 3.5–5.3)
RBC # BLD: 3.02 M/UL — LOW (ref 4.2–5.8)
RBC # BLD: 3.02 M/UL — LOW (ref 4.2–5.8)
RBC # FLD: 14 % — SIGNIFICANT CHANGE UP (ref 10.3–14.5)
RBC # FLD: 14 % — SIGNIFICANT CHANGE UP (ref 10.3–14.5)
SODIUM SERPL-SCNC: 140 MMOL/L — SIGNIFICANT CHANGE UP (ref 135–145)
SODIUM SERPL-SCNC: 140 MMOL/L — SIGNIFICANT CHANGE UP (ref 135–145)
WBC # BLD: 6.37 K/UL — SIGNIFICANT CHANGE UP (ref 3.8–10.5)
WBC # BLD: 6.37 K/UL — SIGNIFICANT CHANGE UP (ref 3.8–10.5)
WBC # FLD AUTO: 6.37 K/UL — SIGNIFICANT CHANGE UP (ref 3.8–10.5)
WBC # FLD AUTO: 6.37 K/UL — SIGNIFICANT CHANGE UP (ref 3.8–10.5)

## 2023-12-06 PROCEDURE — 99223 1ST HOSP IP/OBS HIGH 75: CPT

## 2023-12-06 RX ADMIN — Medication 1 DROP(S): at 18:16

## 2023-12-06 RX ADMIN — Medication 1300 MILLIGRAM(S): at 21:59

## 2023-12-06 RX ADMIN — Medication 1300 MILLIGRAM(S): at 14:35

## 2023-12-06 RX ADMIN — Medication 20 MILLIGRAM(S): at 05:53

## 2023-12-06 RX ADMIN — Medication 100 MILLIGRAM(S): at 21:59

## 2023-12-06 RX ADMIN — SODIUM CHLORIDE 3 MILLILITER(S): 9 INJECTION INTRAMUSCULAR; INTRAVENOUS; SUBCUTANEOUS at 13:10

## 2023-12-06 RX ADMIN — PRIMIDONE 250 MILLIGRAM(S): 250 TABLET ORAL at 21:59

## 2023-12-06 RX ADMIN — SODIUM CHLORIDE 3 MILLILITER(S): 9 INJECTION INTRAMUSCULAR; INTRAVENOUS; SUBCUTANEOUS at 22:04

## 2023-12-06 RX ADMIN — SODIUM CHLORIDE 3 MILLILITER(S): 9 INJECTION INTRAMUSCULAR; INTRAVENOUS; SUBCUTANEOUS at 05:58

## 2023-12-06 RX ADMIN — PANTOPRAZOLE SODIUM 40 MILLIGRAM(S): 20 TABLET, DELAYED RELEASE ORAL at 05:53

## 2023-12-06 RX ADMIN — Medication 1300 MILLIGRAM(S): at 05:53

## 2023-12-06 RX ADMIN — Medication 1 DROP(S): at 05:53

## 2023-12-06 RX ADMIN — Medication 1 DROP(S): at 13:09

## 2023-12-06 NOTE — PROGRESS NOTE ADULT - SUBJECTIVE AND OBJECTIVE BOX
DATE OF SERVICE: 12-06-23 @ 11:17    Patient is a 83y old  Male who presents with a chief complaint of s/p fall (05 Dec 2023 13:06)      SUBJECTIVE / OVERNIGHT EVENTS:  No chest pain. No shortness of breath. No complaints. No events overnight. worsening rash    MEDICATIONS  (STANDING):  artificial tears (preservative free) Ophthalmic Solution 1 Drop(s) Both EYES four times a day  dextrose 50% Injectable 25 Gram(s) IV Push once  dextrose 50% Injectable 12.5 Gram(s) IV Push once  glucagon  Injectable 1 milliGRAM(s) IntraMuscular once  influenza  Vaccine (HIGH DOSE) 0.7 milliLiter(s) IntraMuscular once  insulin lispro (ADMELOG) corrective regimen sliding scale   SubCutaneous three times a day before meals  insulin lispro (ADMELOG) corrective regimen sliding scale   SubCutaneous at bedtime  pantoprazole    Tablet 40 milliGRAM(s) Oral before breakfast  phenytoin   Suspension 100 milliGRAM(s) Oral at bedtime  predniSONE   Tablet 20 milliGRAM(s) Oral daily  primidone 250 milliGRAM(s) Oral at bedtime  propranolol 20 milliGRAM(s) Oral two times a day  sodium bicarbonate 1300 milliGRAM(s) Oral three times a day  sodium chloride 0.9% lock flush 3 milliLiter(s) IV Push every 8 hours    MEDICATIONS  (PRN):  dextrose Oral Gel 15 Gram(s) Oral once PRN Blood Glucose LESS THAN 70 milliGRAM(s)/deciliter  dextrose Oral Gel 15 Gram(s) Oral once PRN Blood Glucose LESS THAN 70 milliGRAM(s)/deciliter  diphenhydrAMINE 25 milliGRAM(s) Oral every 6 hours PRN Rash and/or Itching      Vital Signs Last 24 Hrs  T(C): 36.4 (06 Dec 2023 09:41), Max: 37.1 (05 Dec 2023 17:15)  T(F): 97.6 (06 Dec 2023 09:41), Max: 98.7 (05 Dec 2023 17:15)  HR: 85 (06 Dec 2023 09:41) (77 - 97)  BP: 124/62 (06 Dec 2023 09:41) (118/68 - 162/77)  BP(mean): --  RR: 18 (06 Dec 2023 09:41) (18 - 18)  SpO2: 100% (06 Dec 2023 09:41) (97% - 100%)    Parameters below as of 06 Dec 2023 09:41  Patient On (Oxygen Delivery Method): room air      CAPILLARY BLOOD GLUCOSE      POCT Blood Glucose.: 103 mg/dL (06 Dec 2023 08:46)  POCT Blood Glucose.: 124 mg/dL (05 Dec 2023 22:10)  POCT Blood Glucose.: 107 mg/dL (05 Dec 2023 17:05)  POCT Blood Glucose.: 130 mg/dL (05 Dec 2023 12:45)    I&O's Summary    05 Dec 2023 07:01  -  06 Dec 2023 07:00  --------------------------------------------------------  IN: 360 mL / OUT: 0 mL / NET: 360 mL        PHYSICAL EXAM:  GENERAL: NAD, well-developed  HEAD:  Atraumatic, Normocephalic  EYES: EOMI, PERRLA, conjunctiva and sclera clear  NECK: Supple, No JVD  CHEST/LUNG: Clear to auscultation bilaterally; No wheeze  HEART: Regular rate and rhythm; No murmurs, rubs, or gallops  ABDOMEN: Soft, Nontender, Nondistended; Bowel sounds present  EXTREMITIES:  2+ Peripheral Pulses, No clubbing, cyanosis, or edema  PSYCH: AAOx3  NEUROLOGY: non-focal  SKIN: No rashes or lesions    LABS:                        9.8    6.37  )-----------( 144      ( 06 Dec 2023 07:49 )             31.0     12-06    140  |  109<H>  |  56<H>  ----------------------------<  82  4.3   |  16<L>  |  2.34<H>    Ca    8.2<L>      06 Dec 2023 07:48  Phos  3.7     12-06  Mg     2.0     12-06            Urinalysis Basic - ( 06 Dec 2023 07:48 )    Color: x / Appearance: x / SG: x / pH: x  Gluc: 82 mg/dL / Ketone: x  / Bili: x / Urobili: x   Blood: x / Protein: x / Nitrite: x   Leuk Esterase: x / RBC: x / WBC x   Sq Epi: x / Non Sq Epi: x / Bacteria: x        RADIOLOGY & ADDITIONAL TESTS:    Imaging Personally Reviewed:    Consultant(s) Notes Reviewed:      Care Discussed with Consultants/Other Providers:

## 2023-12-06 NOTE — CONSULT NOTE ADULT - SUBJECTIVE AND OBJECTIVE BOX
HPI:  Chantal Guadalupe is an 83 y.o. M PMHx significant for epilepsy, HTN, BPH, hypothyroidism, who presents to the ED s/p fall to pavement approximately 15:00 today.  Patient had direct impact to left side of face presents with severe ecchymosis of the left eye which is closed shut at this time.  Son who accompanies patient and translates as patient is non-English speaking, states patient was walking with a umbrella 5 steps in front of him.  Umbrella slipped causing him to fall to the floor.  Patient's son states prior to fall patient was not lightheaded or dizzy. He denies LOC after fall.  Patient did not appear to be confused after fall.  Additionally, c.o. pain to the L. knee which has a superficial knee abrasion.  Denies HA, CP, SOB, ABD pain, or any other complaints at this time. Pt does not wear corrective lens. (27 Nov 2023 23:35)        PAST MEDICAL & SURGICAL HISTORY:  Hypertension      Seizure      S/P aortic dissection repair          Review of Systems: ____________________________________  REVIEW OF SYSTEMS      General: no fevers/chills, no lethary	    Skin/Breast: see HPI  	  Ophthalmologic: no eye pain or change in vision  	  ENMT: no dysphagia or change in hearing    Respiratory and Thorax: no SOB or cough  	  Cardiovascular: no palpitations or chest pain    Gastrointestinal: no abdomenal pain or blood in stool     Genitourinary: no dysuria or frequency    Musculoskeletal: no joint pains or weakness	    Neurological:no weakness, numbness , or tingling    MEDICATIONS  (STANDING):  artificial tears (preservative free) Ophthalmic Solution 1 Drop(s) Both EYES four times a day  clobetasol 0.05% Ointment 1 Application(s) Topical two times a day  dextrose 50% Injectable 25 Gram(s) IV Push once  dextrose 50% Injectable 12.5 Gram(s) IV Push once  glucagon  Injectable 1 milliGRAM(s) IntraMuscular once  influenza  Vaccine (HIGH DOSE) 0.7 milliLiter(s) IntraMuscular once  insulin lispro (ADMELOG) corrective regimen sliding scale   SubCutaneous three times a day before meals  insulin lispro (ADMELOG) corrective regimen sliding scale   SubCutaneous at bedtime  phenytoin   Suspension 100 milliGRAM(s) Oral at bedtime  primidone 250 milliGRAM(s) Oral at bedtime  propranolol 20 milliGRAM(s) Oral two times a day  sodium bicarbonate 1300 milliGRAM(s) Oral three times a day  sodium chloride 0.9% lock flush 3 milliLiter(s) IV Push every 8 hours    ALLERGIES: No Known Allergies        SOCIAL HISTORY:  ____________________________________  Social History:    FAMILY HISTORY: ____________________________________  FAMILY HISTORY:        VITAL SIGNS LAST 24 HOURS:  T(F): 98.4 (12-06 @ 21:11), Max: 98.5 (12-06 @ 04:45)  HR: 91 (12-06 @ 21:11) (77 - 91)  BP: 152/76 (12-06 @ 21:11) (124/62 - 152/76)  RR: 18 (12-06 @ 21:11) (18 - 18)    ___________________________________  PHYSICAL EXAM:     The patient was alert and oriented X 3, well nourished, and in no  apparent distress.  OP showed no ulcerations  There was no visible lymphadenopathy.  Conjunctiva were non injected  There was no clubbing or edema of extremities.  The scalp, hair, face, eyebrows, lips, OP, neck, chest, back,   extremities X 4, nails were examined.  There was no hyperhidrosis or bromhidrosis.    Of note on skin exam:     ____________________________________    LABS:                        9.8    6.37  )-----------( 144      ( 06 Dec 2023 07:49 )             31.0     12-06    140  |  109<H>  |  56<H>  ----------------------------<  82  4.3   |  16<L>  |  2.34<H>    Ca    8.2<L>      06 Dec 2023 07:48  Phos  3.7     12-06  Mg     2.0     12-06        Urinalysis Basic - ( 06 Dec 2023 07:48 )    Color: x / Appearance: x / SG: x / pH: x  Gluc: 82 mg/dL / Ketone: x  / Bili: x / Urobili: x   Blood: x / Protein: x / Nitrite: x   Leuk Esterase: x / RBC: x / WBC x   Sq Epi: x / Non Sq Epi: x / Bacteria: x     HPI:  Chantal Guadalupe is an 83 y.o. M PMHx significant for epilepsy, HTN, BPH, type A aortic dissection s/p repair in 2019, hypothyroidism, who presents to the ED s/p fall to pavement approximately 15:00 today.  Patient had direct impact to left side of face presents with severe ecchymosis of the left eye which is closed shut at this time.  Son who accompanies patient and translates as patient is non-English speaking, states patient was walking with a umbrella 5 steps in front of him.  Umbrella slipped causing him to fall to the floor.  Patient's son states prior to fall patient was not lightheaded or dizzy. He denies LOC after fall.  Patient did not appear to be confused after fall.  Additionally, c.o. pain to the L. knee which has a superficial knee abrasion.  Denies HA, CP, SOB, ABD pain, or any other complaints at this time. Pt does not wear corrective lens.     Patient was admitted to the CTU for monitoring after labetolol/cardene gtt; medicine subsequently consulted for BP management.    Dermatology consulted for rash on trunk and extremities since 12/4. Asymptomatic per patient. Per phone call with his son, home medications include losartan, lasix, propranolol, phenytoin, and primidone--he has been stable on these for years. Though keflex is noted on patient's home med list, son reports this is not something he takes at home.     PAST MEDICAL & SURGICAL HISTORY:  Hypertension  Seizure  S/P aortic dissection repair    Review of Systems: ____________________________________  REVIEW OF SYSTEMS    General: no fevers/chills, no lethargy	  Skin/Breast: see HPI  Ophthalmologic: + bruising/swelling around the eyes  ENMT: no dysphagia or change in hearing  Respiratory and Thorax: no SOB or cough  Cardiovascular: no palpitations or chest pain  Gastrointestinal: no abdominal pain or blood in stool   Genitourinary: no dysuria or frequency  Musculoskeletal: no joint pains or weakness	  Neurological:no weakness, numbness , or tingling    MEDICATIONS  (STANDING):  artificial tears (preservative free) Ophthalmic Solution 1 Drop(s) Both EYES four times a day  clobetasol 0.05% Ointment 1 Application(s) Topical two times a day  dextrose 50% Injectable 25 Gram(s) IV Push once  dextrose 50% Injectable 12.5 Gram(s) IV Push once  glucagon  Injectable 1 milliGRAM(s) IntraMuscular once  influenza  Vaccine (HIGH DOSE) 0.7 milliLiter(s) IntraMuscular once  insulin lispro (ADMELOG) corrective regimen sliding scale   SubCutaneous three times a day before meals  insulin lispro (ADMELOG) corrective regimen sliding scale   SubCutaneous at bedtime  phenytoin   Suspension 100 milliGRAM(s) Oral at bedtime  primidone 250 milliGRAM(s) Oral at bedtime  propranolol 20 milliGRAM(s) Oral two times a day  sodium bicarbonate 1300 milliGRAM(s) Oral three times a day  sodium chloride 0.9% lock flush 3 milliLiter(s) IV Push every 8 hours    ALLERGIES: No Known Allergies    SOCIAL HISTORY:  ____________________________________  Social History:    FAMILY HISTORY: ____________________________________  FAMILY HISTORY:    VITAL SIGNS LAST 24 HOURS:  T(F): 98.4 (12-06 @ 21:11), Max: 98.5 (12-06 @ 04:45)  HR: 91 (12-06 @ 21:11) (77 - 91)  BP: 152/76 (12-06 @ 21:11) (124/62 - 152/76)  RR: 18 (12-06 @ 21:11) (18 - 18)    ___________________________________  PHYSICAL EXAM:     The patient was laying in bed and in no  apparent distress.  OP showed no ulcerations  There was no visible lymphadenopathy.  Conjunctiva were non injected  There was no clubbing or edema of extremities.  The scalp, hair, face, eyebrows, lips, OP, neck, chest, back,   extremities X 4, nails were examined.  There was no hyperhidrosis or bromhidrosis.    Of note on skin exam:   periorbital swelling and ecchymoses  pink macules and papules coalescing into patches/plaques on trunk and extremities--blanching throughout trunk/upper extremities, nonblanching on legs  ____________________________________    LABS:                        9.8    6.37  )-----------( 144      ( 06 Dec 2023 07:49 )             31.0     12-06    140  |  109<H>  |  56<H>  ----------------------------<  82  4.3   |  16<L>  |  2.34<H>    Ca    8.2<L>      06 Dec 2023 07:48  Phos  3.7     12-06  Mg     2.0     12-06        Urinalysis Basic - ( 06 Dec 2023 07:48 )    Color: x / Appearance: x / SG: x / pH: x  Gluc: 82 mg/dL / Ketone: x  / Bili: x / Urobili: x   Blood: x / Protein: x / Nitrite: x   Leuk Esterase: x / RBC: x / WBC x   Sq Epi: x / Non Sq Epi: x / Bacteria: x     HPI:  Chantal Guadalupe is an 83 y.o. M PMHx significant for epilepsy, HTN, BPH, type A aortic dissection s/p repair in 2019, hypothyroidism, who presents to the ED s/p fall to pavement approximately 15:00 today.  Patient had direct impact to left side of face presents with severe ecchymosis of the left eye which is closed shut at this time.  Son who accompanies patient and translates as patient is non-English speaking, states patient was walking with a umbrella 5 steps in front of him.  Umbrella slipped causing him to fall to the floor.  Patient's son states prior to fall patient was not lightheaded or dizzy. He denies LOC after fall.  Patient did not appear to be confused after fall.  Additionally, c.o. pain to the L. knee which has a superficial knee abrasion.  Denies HA, CP, SOB, ABD pain, or any other complaints at this time. Pt does not wear corrective lens.     Patient was admitted to the CTU for monitoring after labetolol/cardene gtt; medicine subsequently consulted for BP management.    Dermatology consulted for rash on trunk and extremities since 12/4. Asymptomatic per patient. Per phone call with his son, home medications include losartan, lasix, propranolol, phenytoin, and primidone--he has been stable on these for years. Though keflex is noted on patient's home med list, son reports this is not something he takes at home.     PAST MEDICAL & SURGICAL HISTORY:  Hypertension  Seizure  S/P aortic dissection repair    Review of Systems: ____________________________________  REVIEW OF SYSTEMS    General: no fevers/chills, no lethargy	  Skin/Breast: see HPI  Ophthalmologic: + bruising/swelling around the eyes  ENMT: no dysphagia or change in hearing  Respiratory and Thorax: no SOB or cough  Cardiovascular: no palpitations or chest pain  Gastrointestinal: no abdominal pain or blood in stool   Genitourinary: no dysuria or frequency  Musculoskeletal: no joint pains or weakness	  Neurological:no weakness, numbness , or tingling    MEDICATIONS  (STANDING):  artificial tears (preservative free) Ophthalmic Solution 1 Drop(s) Both EYES four times a day  clobetasol 0.05% Ointment 1 Application(s) Topical two times a day  dextrose 50% Injectable 25 Gram(s) IV Push once  dextrose 50% Injectable 12.5 Gram(s) IV Push once  glucagon  Injectable 1 milliGRAM(s) IntraMuscular once  influenza  Vaccine (HIGH DOSE) 0.7 milliLiter(s) IntraMuscular once  insulin lispro (ADMELOG) corrective regimen sliding scale   SubCutaneous three times a day before meals  insulin lispro (ADMELOG) corrective regimen sliding scale   SubCutaneous at bedtime  phenytoin   Suspension 100 milliGRAM(s) Oral at bedtime  primidone 250 milliGRAM(s) Oral at bedtime  propranolol 20 milliGRAM(s) Oral two times a day  sodium bicarbonate 1300 milliGRAM(s) Oral three times a day  sodium chloride 0.9% lock flush 3 milliLiter(s) IV Push every 8 hours    ALLERGIES: No Known Allergies    SOCIAL HISTORY:  ____________________________________  Social History:    FAMILY HISTORY: ____________________________________  FAMILY HISTORY:    VITAL SIGNS LAST 24 HOURS:  T(F): 98.4 (12-06 @ 21:11), Max: 98.5 (12-06 @ 04:45)  HR: 91 (12-06 @ 21:11) (77 - 91)  BP: 152/76 (12-06 @ 21:11) (124/62 - 152/76)  RR: 18 (12-06 @ 21:11) (18 - 18)    ___________________________________  PHYSICAL EXAM:     The patient was laying in bed and in no  apparent distress.  OP showed no ulcerations  There was no visible lymphadenopathy.  Conjunctiva were non injected  There was no clubbing or edema of extremities.  The scalp, hair, face, eyebrows, lips, OP, neck, chest, back,   extremities X 4, nails were examined.  There was no hyperhidrosis or bromhidrosis.    Of note on skin exam:   periorbital swelling and ecchymoses  facial swelling and erythema  pink macules and papules coalescing into patches/plaques on trunk and extremities--blanching throughout trunk/upper extremities, nonblanching on legs  ____________________________________    LABS:                        9.8    6.37  )-----------( 144      ( 06 Dec 2023 07:49 )             31.0     12-06    140  |  109<H>  |  56<H>  ----------------------------<  82  4.3   |  16<L>  |  2.34<H>    Ca    8.2<L>      06 Dec 2023 07:48  Phos  3.7     12-06  Mg     2.0     12-06        Urinalysis Basic - ( 06 Dec 2023 07:48 )    Color: x / Appearance: x / SG: x / pH: x  Gluc: 82 mg/dL / Ketone: x  / Bili: x / Urobili: x   Blood: x / Protein: x / Nitrite: x   Leuk Esterase: x / RBC: x / WBC x   Sq Epi: x / Non Sq Epi: x / Bacteria: x

## 2023-12-06 NOTE — PROGRESS NOTE ADULT - ASSESSMENT
83 y.o. M PMHx significant for epilepsy, HTN, BPH, hypothyroidism, type a aortic dissection s/p repair in 2019 who presents to the ED s/p fall, admitted to CTU for monitoring s/p labetalol/cardene gtt. Medicine consulted for BP management and tx to medicine service.   Patient is feeling well without any complaints. Currently normotensive without any BP agents. 2d echo normal.  - Would not add any BP agents at this time as SBP remains 100-120s, if trends up add back home meds.    allergic reaction with rash  - unknown culprit  - benadryl prn  - prednisone 20 qd x 5 days  - derm eval    CKD  - nephrology following  - avoid nephrotoxins  - cont bicarb  - d/c iv f     incidental nonspecific rectal wall enhancement on CT  - recommend opt GI referral if within patient's GOC.     anemia  - iron studies c/w ACD  -  ct neg for rp bleed    diabetes  - fs qid  - hgb a1c 5.4  - insulin ss    dispo - PT eval  - Home PT  d/c planning

## 2023-12-06 NOTE — CONSULT NOTE ADULT - ASSESSMENT
# Morbilliform rash--ddx includes exanthematous drug eruption, viral, drug-induced hypersensitivity syndrome (DIHS)   # Morbilliform rash--ddx includes exanthematous drug eruption, viral, drug-induced hypersensitivity syndrome (DIHS/DRESS)  - Patient has been afebrile, is without lymphadenopathy, has an eosinophilia <10%, and has an elevated Cr (etiology unclear--possibly AIN per nephrology). RegiSCAR score for DRESS is 1-2 (no fever, no LAD, unknown atypical lymphs, no eosinophilia >10%, skin rash extent >50%, both edema and purpura, internal organ involvement possibly given elevated Cr, unclear about resolution in 15 days at this point)--possible case of DRESS.  - Culprit medication is unclear. Per patient's son has been on his home medications for years. Started PPI inpatient; rash timing may be early for that to be causative agent.     PLAN:   - Please trend daily CBC with diff and CMP  - Will call patient's pharmacy tomorrow to determine whether/when keflex was administered  - Start clobetasol 0.05% ointment BID to affected areas except for the face  - Please obtain RVP, CMV PCR, HHV6 PCR, and EBV serologies    Patient was seen at bedside and staffed in person with the dermatology attending Dr. cAuna.   Recommendations were communicated with the primary team.  Please page [phone] for further related questions.    Eulalia Chawla MD  Resident Physician, PGY3  [facility]  Pager: [phone] # Morbilliform rash--ddx includes exanthematous drug eruption, viral, drug-induced hypersensitivity syndrome (DIHS/DRESS)  - Patient has been afebrile, is without lymphadenopathy, has an eosinophilia <10%, and has an elevated Cr (etiology unclear--possibly AIN per nephrology). RegiSCAR score for DRESS is 1-2 (no fever, no LAD, unknown atypical lymphs, no eosinophilia >10%, skin rash extent >50%, both edema and purpura, internal organ involvement possibly given elevated Cr, unclear about resolution in 15 days at this point)--possible case of DRESS.  - Culprit medication is unclear. Per patient's son has been on his home medications for years. Started PPI inpatient; rash timing may be early for that to be causative agent.     PLAN:   - Please trend daily CBC with diff and CMP  - Will call patient's pharmacy tomorrow to determine whether/when keflex was administered  - Start clobetasol 0.05% ointment BID to affected areas except for the face  - Please obtain RVP, CMV PCR, HHV6 PCR, and EBV serologies    Patient was seen at bedside and staffed in person with the dermatology attending Dr. Acuna.   Recommendations were communicated with the primary team.  Please page [phone] for further related questions.    Eulalia Chawla MD  Resident Physician, PGY3  [facility]  Pager: [phone] # Morbilliform rash--ddx includes exanthematous drug eruption, viral, drug-induced hypersensitivity syndrome (DIHS/DRESS)  - Patient has been afebrile, is without lymphadenopathy, has an eosinophilia <10%, and has an elevated Cr (etiology unclear--possibly AIN per nephrology). RegiSCAR score for DRESS is 1-2 (no fever, no LAD, unknown atypical lymphs, no eosinophilia >10%, skin rash extent >50%, both edema and purpura, internal organ involvement possibly given elevated Cr, unclear about resolution in 15 days at this point)--possible case of DRESS.  - Culprit medication is unclear. Per patient's son has been on his home medications for years. Started PPI inpatient; rash timing may be early for that to be causative agent.     PLAN:   - Please trend daily CBC with diff and CMP  - Will call patient's pharmacy tomorrow to determine whether/when keflex was administered  - Start clobetasol 0.05% ointment BID to affected areas except for the face  - Please obtain RVP, CMV PCR, HHV6 PCR, and EBV serologies  - Suggest investigation of urine sediment to determine whether elevation in Cr represents AIN    Patient was seen at bedside and staffed in person with the dermatology attending Dr. Acuna.   Recommendations were communicated with the primary team.  Please page [phone] for further related questions.    Eulalia Chawla MD  Resident Physician, PGY3  [facility]at[facility]  Pager: [phone] # Morbilliform rash--ddx includes exanthematous drug eruption, viral, drug-induced hypersensitivity syndrome (DIHS/DRESS)  - Patient has been afebrile, is without lymphadenopathy, has an eosinophilia <10%, and has an elevated Cr (etiology unclear--possibly AIN per nephrology). RegiSCAR score for DRESS is 1-2 (no fever, no LAD, unknown atypical lymphs, no eosinophilia >10%, skin rash extent >50%, both edema and purpura, internal organ involvement possibly given elevated Cr, unclear about resolution in 15 days at this point)--possible case of DRESS.  - Culprit medication is unclear. Per patient's son has been on his home medications for years. Started PPI inpatient; rash timing may be early for that to be causative agent.     PLAN:   - Please trend daily CBC with diff and CMP  - Will call patient's pharmacy tomorrow to determine whether/when keflex was administered  - Start clobetasol 0.05% ointment BID to affected areas except for the face  - Please obtain RVP, CMV PCR, HHV6 PCR, and EBV serologies  - Suggest investigation of urine sediment to determine whether elevation in Cr represents AIN    Patient was seen at bedside and staffed in person with the dermatology attending Dr. Acuna.   Recommendations were communicated with the primary team.  Please page [phone] for further related questions.    Eulalia Chawla MD  Resident Physician, PGY3  [facility]  Pager: [phone]

## 2023-12-07 LAB
ALBUMIN SERPL ELPH-MCNC: 3.8 G/DL — SIGNIFICANT CHANGE UP (ref 3.3–5)
ALBUMIN SERPL ELPH-MCNC: 3.8 G/DL — SIGNIFICANT CHANGE UP (ref 3.3–5)
ALP SERPL-CCNC: 64 U/L — SIGNIFICANT CHANGE UP (ref 40–120)
ALP SERPL-CCNC: 64 U/L — SIGNIFICANT CHANGE UP (ref 40–120)
ALT FLD-CCNC: 17 U/L — SIGNIFICANT CHANGE UP (ref 10–45)
ALT FLD-CCNC: 17 U/L — SIGNIFICANT CHANGE UP (ref 10–45)
ANION GAP SERPL CALC-SCNC: 12 MMOL/L — SIGNIFICANT CHANGE UP (ref 5–17)
ANION GAP SERPL CALC-SCNC: 12 MMOL/L — SIGNIFICANT CHANGE UP (ref 5–17)
APPEARANCE UR: CLEAR — SIGNIFICANT CHANGE UP
APPEARANCE UR: CLEAR — SIGNIFICANT CHANGE UP
AST SERPL-CCNC: 16 U/L — SIGNIFICANT CHANGE UP (ref 10–40)
AST SERPL-CCNC: 16 U/L — SIGNIFICANT CHANGE UP (ref 10–40)
BACTERIA # UR AUTO: NEGATIVE /HPF — SIGNIFICANT CHANGE UP
BACTERIA # UR AUTO: NEGATIVE /HPF — SIGNIFICANT CHANGE UP
BASOPHILS # BLD AUTO: 0.01 K/UL — SIGNIFICANT CHANGE UP (ref 0–0.2)
BASOPHILS # BLD AUTO: 0.01 K/UL — SIGNIFICANT CHANGE UP (ref 0–0.2)
BASOPHILS NFR BLD AUTO: 0.2 % — SIGNIFICANT CHANGE UP (ref 0–2)
BASOPHILS NFR BLD AUTO: 0.2 % — SIGNIFICANT CHANGE UP (ref 0–2)
BILIRUB SERPL-MCNC: 0.2 MG/DL — SIGNIFICANT CHANGE UP (ref 0.2–1.2)
BILIRUB SERPL-MCNC: 0.2 MG/DL — SIGNIFICANT CHANGE UP (ref 0.2–1.2)
BILIRUB UR-MCNC: NEGATIVE — SIGNIFICANT CHANGE UP
BILIRUB UR-MCNC: NEGATIVE — SIGNIFICANT CHANGE UP
BUN SERPL-MCNC: 48 MG/DL — HIGH (ref 7–23)
BUN SERPL-MCNC: 48 MG/DL — HIGH (ref 7–23)
CALCIUM SERPL-MCNC: 8.3 MG/DL — LOW (ref 8.4–10.5)
CALCIUM SERPL-MCNC: 8.3 MG/DL — LOW (ref 8.4–10.5)
CAST: 1 /LPF — SIGNIFICANT CHANGE UP (ref 0–4)
CAST: 1 /LPF — SIGNIFICANT CHANGE UP (ref 0–4)
CHLORIDE SERPL-SCNC: 114 MMOL/L — HIGH (ref 96–108)
CHLORIDE SERPL-SCNC: 114 MMOL/L — HIGH (ref 96–108)
CO2 SERPL-SCNC: 17 MMOL/L — LOW (ref 22–31)
CO2 SERPL-SCNC: 17 MMOL/L — LOW (ref 22–31)
COLOR SPEC: YELLOW — SIGNIFICANT CHANGE UP
COLOR SPEC: YELLOW — SIGNIFICANT CHANGE UP
CREAT SERPL-MCNC: 1.8 MG/DL — HIGH (ref 0.5–1.3)
CREAT SERPL-MCNC: 1.8 MG/DL — HIGH (ref 0.5–1.3)
DIFF PNL FLD: ABNORMAL
DIFF PNL FLD: ABNORMAL
EBV EA AB SER IA-ACNC: 7 U/ML — SIGNIFICANT CHANGE UP
EBV EA AB SER IA-ACNC: 7 U/ML — SIGNIFICANT CHANGE UP
EBV EA AB TITR SER IF: POSITIVE
EBV EA AB TITR SER IF: POSITIVE
EBV EA IGG SER-ACNC: NEGATIVE — SIGNIFICANT CHANGE UP
EBV EA IGG SER-ACNC: NEGATIVE — SIGNIFICANT CHANGE UP
EBV NA IGG SER IA-ACNC: >600 U/ML — HIGH
EBV NA IGG SER IA-ACNC: >600 U/ML — HIGH
EBV PATRN SPEC IB-IMP: SIGNIFICANT CHANGE UP
EBV PATRN SPEC IB-IMP: SIGNIFICANT CHANGE UP
EBV VCA IGG AVIDITY SER QL IA: POSITIVE
EBV VCA IGG AVIDITY SER QL IA: POSITIVE
EBV VCA IGM SER IA-ACNC: 284 U/ML — HIGH
EBV VCA IGM SER IA-ACNC: 284 U/ML — HIGH
EBV VCA IGM SER IA-ACNC: <10 U/ML — SIGNIFICANT CHANGE UP
EBV VCA IGM SER IA-ACNC: <10 U/ML — SIGNIFICANT CHANGE UP
EBV VCA IGM TITR FLD: NEGATIVE — SIGNIFICANT CHANGE UP
EBV VCA IGM TITR FLD: NEGATIVE — SIGNIFICANT CHANGE UP
EGFR: 37 ML/MIN/1.73M2 — LOW
EGFR: 37 ML/MIN/1.73M2 — LOW
EOSINOPHIL # BLD AUTO: 0.77 K/UL — HIGH (ref 0–0.5)
EOSINOPHIL # BLD AUTO: 0.77 K/UL — HIGH (ref 0–0.5)
EOSINOPHIL NFR BLD AUTO: 13.3 % — HIGH (ref 0–6)
EOSINOPHIL NFR BLD AUTO: 13.3 % — HIGH (ref 0–6)
GLUCOSE BLDC GLUCOMTR-MCNC: 153 MG/DL — HIGH (ref 70–99)
GLUCOSE BLDC GLUCOMTR-MCNC: 153 MG/DL — HIGH (ref 70–99)
GLUCOSE BLDC GLUCOMTR-MCNC: 96 MG/DL — SIGNIFICANT CHANGE UP (ref 70–99)
GLUCOSE BLDC GLUCOMTR-MCNC: 96 MG/DL — SIGNIFICANT CHANGE UP (ref 70–99)
GLUCOSE BLDC GLUCOMTR-MCNC: 99 MG/DL — SIGNIFICANT CHANGE UP (ref 70–99)
GLUCOSE SERPL-MCNC: 96 MG/DL — SIGNIFICANT CHANGE UP (ref 70–99)
GLUCOSE SERPL-MCNC: 96 MG/DL — SIGNIFICANT CHANGE UP (ref 70–99)
GLUCOSE UR QL: NEGATIVE MG/DL — SIGNIFICANT CHANGE UP
GLUCOSE UR QL: NEGATIVE MG/DL — SIGNIFICANT CHANGE UP
HCT VFR BLD CALC: 29.3 % — LOW (ref 39–50)
HCT VFR BLD CALC: 29.3 % — LOW (ref 39–50)
HGB BLD-MCNC: 9.4 G/DL — LOW (ref 13–17)
HGB BLD-MCNC: 9.4 G/DL — LOW (ref 13–17)
IMM GRANULOCYTES NFR BLD AUTO: 0.3 % — SIGNIFICANT CHANGE UP (ref 0–0.9)
IMM GRANULOCYTES NFR BLD AUTO: 0.3 % — SIGNIFICANT CHANGE UP (ref 0–0.9)
KETONES UR-MCNC: NEGATIVE MG/DL — SIGNIFICANT CHANGE UP
KETONES UR-MCNC: NEGATIVE MG/DL — SIGNIFICANT CHANGE UP
LEUKOCYTE ESTERASE UR-ACNC: NEGATIVE — SIGNIFICANT CHANGE UP
LEUKOCYTE ESTERASE UR-ACNC: NEGATIVE — SIGNIFICANT CHANGE UP
LYMPHOCYTES # BLD AUTO: 0.91 K/UL — LOW (ref 1–3.3)
LYMPHOCYTES # BLD AUTO: 0.91 K/UL — LOW (ref 1–3.3)
LYMPHOCYTES # BLD AUTO: 15.7 % — SIGNIFICANT CHANGE UP (ref 13–44)
LYMPHOCYTES # BLD AUTO: 15.7 % — SIGNIFICANT CHANGE UP (ref 13–44)
MCHC RBC-ENTMCNC: 32.1 GM/DL — SIGNIFICANT CHANGE UP (ref 32–36)
MCHC RBC-ENTMCNC: 32.1 GM/DL — SIGNIFICANT CHANGE UP (ref 32–36)
MCHC RBC-ENTMCNC: 32.3 PG — SIGNIFICANT CHANGE UP (ref 27–34)
MCHC RBC-ENTMCNC: 32.3 PG — SIGNIFICANT CHANGE UP (ref 27–34)
MCV RBC AUTO: 100.7 FL — HIGH (ref 80–100)
MCV RBC AUTO: 100.7 FL — HIGH (ref 80–100)
MONOCYTES # BLD AUTO: 0.53 K/UL — SIGNIFICANT CHANGE UP (ref 0–0.9)
MONOCYTES # BLD AUTO: 0.53 K/UL — SIGNIFICANT CHANGE UP (ref 0–0.9)
MONOCYTES NFR BLD AUTO: 9.2 % — SIGNIFICANT CHANGE UP (ref 2–14)
MONOCYTES NFR BLD AUTO: 9.2 % — SIGNIFICANT CHANGE UP (ref 2–14)
NEUTROPHILS # BLD AUTO: 3.54 K/UL — SIGNIFICANT CHANGE UP (ref 1.8–7.4)
NEUTROPHILS # BLD AUTO: 3.54 K/UL — SIGNIFICANT CHANGE UP (ref 1.8–7.4)
NEUTROPHILS NFR BLD AUTO: 61.3 % — SIGNIFICANT CHANGE UP (ref 43–77)
NEUTROPHILS NFR BLD AUTO: 61.3 % — SIGNIFICANT CHANGE UP (ref 43–77)
NITRITE UR-MCNC: NEGATIVE — SIGNIFICANT CHANGE UP
NITRITE UR-MCNC: NEGATIVE — SIGNIFICANT CHANGE UP
NRBC # BLD: 0 /100 WBCS — SIGNIFICANT CHANGE UP (ref 0–0)
NRBC # BLD: 0 /100 WBCS — SIGNIFICANT CHANGE UP (ref 0–0)
PH UR: 6 — SIGNIFICANT CHANGE UP (ref 5–8)
PH UR: 6 — SIGNIFICANT CHANGE UP (ref 5–8)
PLATELET # BLD AUTO: 142 K/UL — LOW (ref 150–400)
PLATELET # BLD AUTO: 142 K/UL — LOW (ref 150–400)
POTASSIUM SERPL-MCNC: 4.3 MMOL/L — SIGNIFICANT CHANGE UP (ref 3.5–5.3)
POTASSIUM SERPL-MCNC: 4.3 MMOL/L — SIGNIFICANT CHANGE UP (ref 3.5–5.3)
POTASSIUM SERPL-SCNC: 4.3 MMOL/L — SIGNIFICANT CHANGE UP (ref 3.5–5.3)
POTASSIUM SERPL-SCNC: 4.3 MMOL/L — SIGNIFICANT CHANGE UP (ref 3.5–5.3)
PROT SERPL-MCNC: 6.4 G/DL — SIGNIFICANT CHANGE UP (ref 6–8.3)
PROT SERPL-MCNC: 6.4 G/DL — SIGNIFICANT CHANGE UP (ref 6–8.3)
PROT UR-MCNC: 30 MG/DL
PROT UR-MCNC: 30 MG/DL
RAPID RVP RESULT: SIGNIFICANT CHANGE UP
RAPID RVP RESULT: SIGNIFICANT CHANGE UP
RBC # BLD: 2.91 M/UL — LOW (ref 4.2–5.8)
RBC # BLD: 2.91 M/UL — LOW (ref 4.2–5.8)
RBC # FLD: 14 % — SIGNIFICANT CHANGE UP (ref 10.3–14.5)
RBC # FLD: 14 % — SIGNIFICANT CHANGE UP (ref 10.3–14.5)
RBC CASTS # UR COMP ASSIST: 6 /HPF — HIGH (ref 0–4)
RBC CASTS # UR COMP ASSIST: 6 /HPF — HIGH (ref 0–4)
SARS-COV-2 RNA SPEC QL NAA+PROBE: SIGNIFICANT CHANGE UP
SARS-COV-2 RNA SPEC QL NAA+PROBE: SIGNIFICANT CHANGE UP
SODIUM SERPL-SCNC: 143 MMOL/L — SIGNIFICANT CHANGE UP (ref 135–145)
SODIUM SERPL-SCNC: 143 MMOL/L — SIGNIFICANT CHANGE UP (ref 135–145)
SP GR SPEC: 1.01 — SIGNIFICANT CHANGE UP (ref 1–1.03)
SP GR SPEC: 1.01 — SIGNIFICANT CHANGE UP (ref 1–1.03)
SQUAMOUS # UR AUTO: 0 /HPF — SIGNIFICANT CHANGE UP (ref 0–5)
SQUAMOUS # UR AUTO: 0 /HPF — SIGNIFICANT CHANGE UP (ref 0–5)
UROBILINOGEN FLD QL: 0.2 MG/DL — SIGNIFICANT CHANGE UP (ref 0.2–1)
UROBILINOGEN FLD QL: 0.2 MG/DL — SIGNIFICANT CHANGE UP (ref 0.2–1)
WBC # BLD: 5.78 K/UL — SIGNIFICANT CHANGE UP (ref 3.8–10.5)
WBC # BLD: 5.78 K/UL — SIGNIFICANT CHANGE UP (ref 3.8–10.5)
WBC # FLD AUTO: 5.78 K/UL — SIGNIFICANT CHANGE UP (ref 3.8–10.5)
WBC # FLD AUTO: 5.78 K/UL — SIGNIFICANT CHANGE UP (ref 3.8–10.5)
WBC UR QL: 0 /HPF — SIGNIFICANT CHANGE UP (ref 0–5)
WBC UR QL: 0 /HPF — SIGNIFICANT CHANGE UP (ref 0–5)

## 2023-12-07 PROCEDURE — 99233 SBSQ HOSP IP/OBS HIGH 50: CPT

## 2023-12-07 RX ADMIN — Medication 1 DROP(S): at 18:23

## 2023-12-07 RX ADMIN — Medication 1300 MILLIGRAM(S): at 13:01

## 2023-12-07 RX ADMIN — Medication 1 DROP(S): at 00:33

## 2023-12-07 RX ADMIN — Medication 100 MILLIGRAM(S): at 21:16

## 2023-12-07 RX ADMIN — Medication 1 APPLICATION(S): at 18:24

## 2023-12-07 RX ADMIN — SODIUM CHLORIDE 3 MILLILITER(S): 9 INJECTION INTRAMUSCULAR; INTRAVENOUS; SUBCUTANEOUS at 06:34

## 2023-12-07 RX ADMIN — Medication 1300 MILLIGRAM(S): at 05:59

## 2023-12-07 RX ADMIN — SODIUM CHLORIDE 3 MILLILITER(S): 9 INJECTION INTRAMUSCULAR; INTRAVENOUS; SUBCUTANEOUS at 13:02

## 2023-12-07 RX ADMIN — Medication 1300 MILLIGRAM(S): at 21:16

## 2023-12-07 RX ADMIN — SODIUM CHLORIDE 3 MILLILITER(S): 9 INJECTION INTRAMUSCULAR; INTRAVENOUS; SUBCUTANEOUS at 21:50

## 2023-12-07 RX ADMIN — Medication 1 DROP(S): at 05:59

## 2023-12-07 RX ADMIN — Medication 1 DROP(S): at 13:03

## 2023-12-07 RX ADMIN — PRIMIDONE 250 MILLIGRAM(S): 250 TABLET ORAL at 21:16

## 2023-12-07 RX ADMIN — Medication 1 DROP(S): at 23:47

## 2023-12-07 NOTE — PROGRESS NOTE ADULT - ASSESSMENT
Collins is an 83 y.o. M PMHx significant for epilepsy, HTN, BPH, hypothyroidism, who presents to the ED s/p fall to pavement .  Patient had direct impact to left side of face presents with severe ecchymosis of the left eye which is closed shut at this time.  Son who accompanies patient and translates as patient is non-English speaking, states patient was walking with a umbrella 5 steps in front of him.  Umbrella slipped causing him to fall to the floor.  Patient's son states prior to fall patient was not lightheaded or dizzy. He denies LOC after fall.  Patient did not appear to be confused after fall.  Additionally, c.o. pain to the L. knee which has a superficial knee abrasion. noticed with abn creatinine.      1- CKD III based on previous blood work in the system when reviewed  2- HTN   3- syncope   4- anemia       creatinine improving today.  baseline creatinine unknown.  unlikely AIN given creatinine is improving.   PPI discontinued.   derm following for rash  anemia, trend hgb  acidosis, improving  sodium bicarbonate to 1300 mg po tid   cpk in range   recent ct a/p without hydro  strict I/O  trend creatinine and electrolytes daily

## 2023-12-07 NOTE — PROGRESS NOTE ADULT - ASSESSMENT
83 y.o. M PMHx significant for epilepsy, HTN, BPH, hypothyroidism, type a aortic dissection s/p repair in 2019 who presents to the ED s/p fall, admitted to CTU for monitoring s/p labetalol/cardene gtt. Medicine consulted for BP management and tx to medicine service.   Patient is feeling well without any complaints. Currently normotensive without any BP agents. 2d echo normal.  - Would not add any BP agents at this time as SBP remains 100-120s, if trends up add back home meds.    allergic reaction with rash  - unknown culprit  - benadryl prn  - prednisone 20 qd x 5 days  - derm eval appreciated  - r/o DRESS syndrome    CKD  - nephrology following  - avoid nephrotoxins  - cont bicarb  - creatinine is steady  unclear creatinine baseline, patient does not recall baseline creatinine. attempted to call patient outpatient provider listed, unable to get through x 2.  acidosis, bicarb continues to worsen  increase sodium bicarbonate to 1300 mg po tid   lactic acid and bet hydroxy butyrate  in range   cpk in range   also with worsening generalized rash, differential added to cbc ?AIN  recent ct a/p without hydro  strict I/O  trend creatinine and electrolytes daily       incidental nonspecific rectal wall enhancement on CT  - recommend opt GI referral if within patient's GOC.     anemia  - iron studies c/w ACD  -  ct neg for rp bleed    diabetes  - fs qid  - hgb a1c 5.4  - insulin ss    dispo - PT eval  - Home PT  d/c planning

## 2023-12-07 NOTE — PROGRESS NOTE ADULT - SUBJECTIVE AND OBJECTIVE BOX
[facility]   [phone]  RENAL FOLLOW UP NOTE  --------------------------------------------------------------------------------  Chief Complaint:    24 hour events/subjective:    patient seen and examined   denies sob    PAST HISTORY  --------------------------------------------------------------------------------  No significant changes to PMH, PSH, FHx, SHx, unless otherwise noted    ALLERGIES & MEDICATIONS  --------------------------------------------------------------------------------  Allergies    No Known Allergies    Intolerances      Standing Inpatient Medications  artificial tears (preservative free) Ophthalmic Solution 1 Drop(s) Both EYES four times a day  clobetasol 0.05% Ointment 1 Application(s) Topical two times a day  dextrose 50% Injectable 25 Gram(s) IV Push once  dextrose 50% Injectable 12.5 Gram(s) IV Push once  glucagon  Injectable 1 milliGRAM(s) IntraMuscular once  influenza  Vaccine (HIGH DOSE) 0.7 milliLiter(s) IntraMuscular once  insulin lispro (ADMELOG) corrective regimen sliding scale   SubCutaneous three times a day before meals  insulin lispro (ADMELOG) corrective regimen sliding scale   SubCutaneous at bedtime  phenytoin   Suspension 100 milliGRAM(s) Oral at bedtime  primidone 250 milliGRAM(s) Oral at bedtime  propranolol 20 milliGRAM(s) Oral two times a day  sodium bicarbonate 1300 milliGRAM(s) Oral three times a day  sodium chloride 0.9% lock flush 3 milliLiter(s) IV Push every 8 hours    PRN Inpatient Medications  dextrose Oral Gel 15 Gram(s) Oral once PRN  dextrose Oral Gel 15 Gram(s) Oral once PRN  diphenhydrAMINE 25 milliGRAM(s) Oral every 6 hours PRN      REVIEW OF SYSTEMS  --------------------------------------------------------------------------------    Gen: denies fevers/chills,  CVS: denies chest pain  Resp: denies SOB/Cough  GI: Denies N/V/Abd pain  : Denies dysuria/oliguria/hematuria    VITALS/PHYSICAL EXAM  --------------------------------------------------------------------------------  T(C): 36.4 (12-07-23 @ 11:34), Max: 36.9 (12-06-23 @ 21:11)  HR: 84 (12-07-23 @ 11:34) (83 - 93)  BP: 128/72 (12-07-23 @ 11:34) (128/72 - 158/73)  RR: 18 (12-07-23 @ 11:34) (18 - 18)  SpO2: 98% (12-07-23 @ 11:34) (97% - 100%)  Wt(kg): --        12-07-23 @ 07:01  -  12-07-23 @ 14:53  --------------------------------------------------------  IN: 440 mL / OUT: 0 mL / NET: 440 mL      Physical Exam:  	  	Gen: Non toxic comfortable appearing  facial ecchymosis and periorbital as well   	Pulm: decrease bs  no rales or ronchi or wheezing  	CV: RRR, S1S2; no rub  	Abd: +BS, soft, nontender/nondistended  	: No suprapubic tenderness  	UE: Warm, no cyanosis  no clubbing,  no edema  	LE: Warm, no cyanosis  no clubbing, no edema  	Neuro: alert and oriented. speech coherent              Skin: generalized rash, inner thigh, chest, abdomen, back and arms     LABS/STUDIES  --------------------------------------------------------------------------------              9.4    5.78  >-----------<  142      [12-07-23 @ 06:10]              29.3     143  |  114  |  48  ----------------------------<  96      [12-07-23 @ 06:07]  4.3   |  17  |  1.80        Ca     8.3     [12-07-23 @ 06:07]      Mg     2.0     [12-06-23 @ 07:48]      Phos  3.7     [12-06-23 @ 07:48]    TPro  6.4  /  Alb  3.8  /  TBili  0.2  /  DBili  x   /  AST  16  /  ALT  17  /  AlkPhos  64  [12-07-23 @ 06:07]          Creatinine Trend:  SCr 1.80 [12-07 @ 06:07]  SCr 2.34 [12-06 @ 07:48]  SCr 2.35 [12-05 @ 07:11]  SCr 2.44 [12-04 @ 06:03]  SCr 2.00 [12-03 @ 07:02]        Urine Creatinine 73      [12-04-23 @ 18:43]  Urine Protein 28      [12-04-23 @ 18:43]    Iron 62, TIBC 191, %sat 32      [12-04-23 @ 06:03]  Ferritin 182      [12-04-23 @ 06:04]  TSH 5.30      [11-30-23 @ 06:31]       [facility]   [phone]  RENAL FOLLOW UP NOTE  --------------------------------------------------------------------------------  Chief Complaint:    24 hour events/subjective:    patient seen and examined   denies sob    PAST HISTORY  --------------------------------------------------------------------------------  No significant changes to PMH, PSH, FHx, SHx, unless otherwise noted    ALLERGIES & MEDICATIONS  --------------------------------------------------------------------------------  Allergies    No Known Allergies    Intolerances      Standing Inpatient Medications  artificial tears (preservative free) Ophthalmic Solution 1 Drop(s) Both EYES four times a day  clobetasol 0.05% Ointment 1 Application(s) Topical two times a day  dextrose 50% Injectable 25 Gram(s) IV Push once  dextrose 50% Injectable 12.5 Gram(s) IV Push once  glucagon  Injectable 1 milliGRAM(s) IntraMuscular once  influenza  Vaccine (HIGH DOSE) 0.7 milliLiter(s) IntraMuscular once  insulin lispro (ADMELOG) corrective regimen sliding scale   SubCutaneous three times a day before meals  insulin lispro (ADMELOG) corrective regimen sliding scale   SubCutaneous at bedtime  phenytoin   Suspension 100 milliGRAM(s) Oral at bedtime  primidone 250 milliGRAM(s) Oral at bedtime  propranolol 20 milliGRAM(s) Oral two times a day  sodium bicarbonate 1300 milliGRAM(s) Oral three times a day  sodium chloride 0.9% lock flush 3 milliLiter(s) IV Push every 8 hours    PRN Inpatient Medications  dextrose Oral Gel 15 Gram(s) Oral once PRN  dextrose Oral Gel 15 Gram(s) Oral once PRN  diphenhydrAMINE 25 milliGRAM(s) Oral every 6 hours PRN      REVIEW OF SYSTEMS  --------------------------------------------------------------------------------    Gen: denies fevers/chills,  CVS: denies chest pain  Resp: denies SOB/Cough  GI: Denies N/V/Abd pain  : Denies dysuria/oliguria/hematuria    VITALS/PHYSICAL EXAM  --------------------------------------------------------------------------------  T(C): 36.4 (12-07-23 @ 11:34), Max: 36.9 (12-06-23 @ 21:11)  HR: 84 (12-07-23 @ 11:34) (83 - 93)  BP: 128/72 (12-07-23 @ 11:34) (128/72 - 158/73)  RR: 18 (12-07-23 @ 11:34) (18 - 18)  SpO2: 98% (12-07-23 @ 11:34) (97% - 100%)  Wt(kg): --        12-07-23 @ 07:01  -  12-07-23 @ 14:53  --------------------------------------------------------  IN: 440 mL / OUT: 0 mL / NET: 440 mL      Physical Exam:  	  	Gen: Non toxic comfortable appearing  facial ecchymosis and periorbital as well   	Pulm: decrease bs  no rales or ronchi or wheezing  	CV: RRR, S1S2; no rub  	Abd: +BS, soft, nontender/nondistended  	: No suprapubic tenderness  	UE: Warm, no cyanosis  no clubbing,  no edema  	LE: Warm, no cyanosis  no clubbing, no edema  	Neuro: alert and oriented. speech coherent              Skin: generalized rash, inner thigh, chest, abdomen, back and arms     LABS/STUDIES  --------------------------------------------------------------------------------              9.4    5.78  >-----------<  142      [12-07-23 @ 06:10]              29.3     143  |  114  |  48  ----------------------------<  96      [12-07-23 @ 06:07]  4.3   |  17  |  1.80        Ca     8.3     [12-07-23 @ 06:07]      Mg     2.0     [12-06-23 @ 07:48]      Phos  3.7     [12-06-23 @ 07:48]    TPro  6.4  /  Alb  3.8  /  TBili  0.2  /  DBili  x   /  AST  16  /  ALT  17  /  AlkPhos  64  [12-07-23 @ 06:07]          Creatinine Trend:  SCr 1.80 [12-07 @ 06:07]  SCr 2.34 [12-06 @ 07:48]  SCr 2.35 [12-05 @ 07:11]  SCr 2.44 [12-04 @ 06:03]  SCr 2.00 [12-03 @ 07:02]        Urine Creatinine 73      [12-04-23 @ 18:43]  Urine Protein 28      [12-04-23 @ 18:43]    Iron 62, TIBC 191, %sat 32      [12-04-23 @ 06:03]  Ferritin 182      [12-04-23 @ 06:04]  TSH 5.30      [11-30-23 @ 06:31]

## 2023-12-07 NOTE — PROGRESS NOTE ADULT - SUBJECTIVE AND OBJECTIVE BOX
INTERVAL HPI/OVERNIGHT EVENTS:    Patient feels rash may be slightly improved; still endorses some itch.     ________________________________    REVIEW OF SYSTEMS    General: no fevers/chills, no NS	    Skin: see HPI  	  Ophthalmologic: no eye pain or change in vision    Genitourinary: no dysuria or hematuria    Musculoskeletal: no joint pains or weakness	    Neurological:no weakness or tingling        ROS negative except if noted in the above subjective text. All other systems reviewed and negative.    MEDICATIONS  (STANDING):  artificial tears (preservative free) Ophthalmic Solution 1 Drop(s) Both EYES four times a day  clobetasol 0.05% Ointment 1 Application(s) Topical two times a day  dextrose 50% Injectable 25 Gram(s) IV Push once  dextrose 50% Injectable 12.5 Gram(s) IV Push once  glucagon  Injectable 1 milliGRAM(s) IntraMuscular once  influenza  Vaccine (HIGH DOSE) 0.7 milliLiter(s) IntraMuscular once  insulin lispro (ADMELOG) corrective regimen sliding scale   SubCutaneous three times a day before meals  insulin lispro (ADMELOG) corrective regimen sliding scale   SubCutaneous at bedtime  phenytoin   Suspension 100 milliGRAM(s) Oral at bedtime  primidone 250 milliGRAM(s) Oral at bedtime  propranolol 20 milliGRAM(s) Oral two times a day  sodium bicarbonate 1300 milliGRAM(s) Oral three times a day  sodium chloride 0.9% lock flush 3 milliLiter(s) IV Push every 8 hours    MEDICATIONS  (PRN):  dextrose Oral Gel 15 Gram(s) Oral once PRN Blood Glucose LESS THAN 70 milliGRAM(s)/deciliter  dextrose Oral Gel 15 Gram(s) Oral once PRN Blood Glucose LESS THAN 70 milliGRAM(s)/deciliter  diphenhydrAMINE 25 milliGRAM(s) Oral every 6 hours PRN Rash and/or Itching      Allergies    No Known Allergies    Intolerances          O: ICU Vital Signs Last 24 Hrs  T(C): 37.1 (07 Dec 2023 21:12), Max: 37.1 (07 Dec 2023 21:12)  T(F): 98.7 (07 Dec 2023 21:12), Max: 98.7 (07 Dec 2023 21:12)  HR: 78 (07 Dec 2023 21:12) (78 - 93)  BP: 144/77 (07 Dec 2023 21:12) (128/72 - 158/73)  BP(mean): --  ABP: --  ABP(mean): --  RR: 18 (07 Dec 2023 21:12) (18 - 18)  SpO2: 98% (07 Dec 2023 21:12) (97% - 100%)    O2 Parameters below as of 07 Dec 2023 21:12  Patient On (Oxygen Delivery Method): room air          I&O's Detail    07 Dec 2023 07:01  -  07 Dec 2023 21:54  --------------------------------------------------------  IN:    Oral Fluid: 440 mL  Total IN: 440 mL    OUT:  Total OUT: 0 mL    Total NET: 440 mL          ___________________________________  PHYSICAL EXAM:     The patient was alert and oriented X 3, well nourished, and in no  apparent distress.  OP showed no ulcerations  There was no visible lymphadenopathy.  Conjunctiva were non injected  There was no clubbing or edema of extremities.  The scalp, hair, face, eyebrows, lips, OP, neck, chest, back,   extremities X 4, nails were examined.  There was no hyperhidrosis or bromhidrosis.    Of note on skin exam:     ____________________________________      Labs:                       9.4    5.78  )-----------( 142      ( 07 Dec 2023 06:10 )             29.3     CBC Full  -  ( 07 Dec 2023 06:10 )  WBC Count : 5.78 K/uL  RBC Count : 2.91 M/uL  Hemoglobin : 9.4 g/dL  Hematocrit : 29.3 %  Platelet Count - Automated : 142 K/uL  Mean Cell Volume : 100.7 fl  Mean Cell Hemoglobin : 32.3 pg  Mean Cell Hemoglobin Concentration : 32.1 gm/dL  Auto Neutrophil # : 3.54 K/uL  Auto Lymphocyte # : 0.91 K/uL  Auto Monocyte # : 0.53 K/uL  Auto Eosinophil # : 0.77 K/uL  Auto Basophil # : 0.01 K/uL  Auto Neutrophil % : 61.3 %  Auto Lymphocyte % : 15.7 %  Auto Monocyte % : 9.2 %  Auto Eosinophil % : 13.3 %  Auto Basophil % : 0.2 %        143  |  114<H>  |  48<H>  ----------------------------<  96  4.3   |  17<L>  |  1.80<H>    Ca    8.3<L>      07 Dec 2023 06:07  Phos  3.7     12  Mg     2.0         TPro  6.4  /  Alb  3.8  /  TBili  0.2  /  DBili  x   /  AST  16  /  ALT  17  /  AlkPhos  64  12-      Urinalysis Basic - ( 07 Dec 2023 13:22 )    Color: Yellow / Appearance: Clear / S.015 / pH: x  Gluc: x / Ketone: Negative mg/dL  / Bili: Negative / Urobili: 0.2 mg/dL   Blood: x / Protein: 30 mg/dL / Nitrite: Negative   Leuk Esterase: Negative / RBC: 6 /HPF / WBC 0 /HPF   Sq Epi: x / Non Sq Epi: 0 /HPF / Bacteria: Negative /HPF      CAPILLARY BLOOD GLUCOSE      POCT Blood Glucose.: 153 mg/dL (07 Dec 2023 21:53)     INTERVAL HPI/OVERNIGHT EVENTS:    Patient feels rash may be slightly improved; still endorses some itch.     ________________________________    REVIEW OF SYSTEMS    General: no fevers/chills, no NS	    Skin: see HPI  	  Ophthalmologic: no eye pain or change in vision    Genitourinary: no dysuria or hematuria    Musculoskeletal: no joint pains or weakness	    Neurological:no weakness or tingling        ROS negative except if noted in the above subjective text. All other systems reviewed and negative.    MEDICATIONS  (STANDING):  artificial tears (preservative free) Ophthalmic Solution 1 Drop(s) Both EYES four times a day  clobetasol 0.05% Ointment 1 Application(s) Topical two times a day  dextrose 50% Injectable 25 Gram(s) IV Push once  dextrose 50% Injectable 12.5 Gram(s) IV Push once  glucagon  Injectable 1 milliGRAM(s) IntraMuscular once  influenza  Vaccine (HIGH DOSE) 0.7 milliLiter(s) IntraMuscular once  insulin lispro (ADMELOG) corrective regimen sliding scale   SubCutaneous three times a day before meals  insulin lispro (ADMELOG) corrective regimen sliding scale   SubCutaneous at bedtime  phenytoin   Suspension 100 milliGRAM(s) Oral at bedtime  primidone 250 milliGRAM(s) Oral at bedtime  propranolol 20 milliGRAM(s) Oral two times a day  sodium bicarbonate 1300 milliGRAM(s) Oral three times a day  sodium chloride 0.9% lock flush 3 milliLiter(s) IV Push every 8 hours    MEDICATIONS  (PRN):  dextrose Oral Gel 15 Gram(s) Oral once PRN Blood Glucose LESS THAN 70 milliGRAM(s)/deciliter  dextrose Oral Gel 15 Gram(s) Oral once PRN Blood Glucose LESS THAN 70 milliGRAM(s)/deciliter  diphenhydrAMINE 25 milliGRAM(s) Oral every 6 hours PRN Rash and/or Itching      Allergies    No Known Allergies    Intolerances          O: ICU Vital Signs Last 24 Hrs  T(C): 37.1 (07 Dec 2023 21:12), Max: 37.1 (07 Dec 2023 21:12)  T(F): 98.7 (07 Dec 2023 21:12), Max: 98.7 (07 Dec 2023 21:12)  HR: 78 (07 Dec 2023 21:12) (78 - 93)  BP: 144/77 (07 Dec 2023 21:12) (128/72 - 158/73)  BP(mean): --  ABP: --  ABP(mean): --  RR: 18 (07 Dec 2023 21:12) (18 - 18)  SpO2: 98% (07 Dec 2023 21:12) (97% - 100%)    O2 Parameters below as of 07 Dec 2023 21:12  Patient On (Oxygen Delivery Method): room air          I&O's Detail    07 Dec 2023 07:01  -  07 Dec 2023 21:54  --------------------------------------------------------  IN:    Oral Fluid: 440 mL  Total IN: 440 mL    OUT:  Total OUT: 0 mL    Total NET: 440 mL          ___________________________________  PHYSICAL EXAM:     The patient was laying in bed and in no  apparent distress.  There was no visible lymphadenopathy.  Conjunctiva were non injected  There was no clubbing or edema of extremities.  The scalp, hair, face, eyebrows, lips, OP, neck, chest, back,   extremities X 4, nails were examined.  There was no hyperhidrosis or bromhidrosis.    Of note on skin exam:   erythema of bilateral ears  swelling/ecchymoses around L eye  pink patches on trunk and extremities--blanching on trunk/upper extremities, nonblanching on legs  ____________________________________      Labs:                       9.4    5.78  )-----------( 142      ( 07 Dec 2023 06:10 )             29.3     CBC Full  -  ( 07 Dec 2023 06:10 )  WBC Count : 5.78 K/uL  RBC Count : 2.91 M/uL  Hemoglobin : 9.4 g/dL  Hematocrit : 29.3 %  Platelet Count - Automated : 142 K/uL  Mean Cell Volume : 100.7 fl  Mean Cell Hemoglobin : 32.3 pg  Mean Cell Hemoglobin Concentration : 32.1 gm/dL  Auto Neutrophil # : 3.54 K/uL  Auto Lymphocyte # : 0.91 K/uL  Auto Monocyte # : 0.53 K/uL  Auto Eosinophil # : 0.77 K/uL  Auto Basophil # : 0.01 K/uL  Auto Neutrophil % : 61.3 %  Auto Lymphocyte % : 15.7 %  Auto Monocyte % : 9.2 %  Auto Eosinophil % : 13.3 %  Auto Basophil % : 0.2 %        143  |  114<H>  |  48<H>  ----------------------------<  96  4.3   |  17<L>  |  1.80<H>    Ca    8.3<L>      07 Dec 2023 06:07  Phos  3.7     12  Mg     2.0         TPro  6.4  /  Alb  3.8  /  TBili  0.2  /  DBili  x   /  AST  16  /  ALT  17  /  AlkPhos  64  12-07      Urinalysis Basic - ( 07 Dec 2023 13:22 )    Color: Yellow / Appearance: Clear / S.015 / pH: x  Gluc: x / Ketone: Negative mg/dL  / Bili: Negative / Urobili: 0.2 mg/dL   Blood: x / Protein: 30 mg/dL / Nitrite: Negative   Leuk Esterase: Negative / RBC: 6 /HPF / WBC 0 /HPF   Sq Epi: x / Non Sq Epi: 0 /HPF / Bacteria: Negative /HPF      CAPILLARY BLOOD GLUCOSE      POCT Blood Glucose.: 153 mg/dL (07 Dec 2023 21:53)

## 2023-12-07 NOTE — PROGRESS NOTE ADULT - SUBJECTIVE AND OBJECTIVE BOX
DATE OF SERVICE: 12-07-23 @ 12:19    Patient is a 83y old  Male who presents with a chief complaint of s/p fall (06 Dec 2023 22:59)      SUBJECTIVE / OVERNIGHT EVENTS:  No chest pain. No shortness of breath. No complaints. No events overnight.     MEDICATIONS  (STANDING):  artificial tears (preservative free) Ophthalmic Solution 1 Drop(s) Both EYES four times a day  clobetasol 0.05% Ointment 1 Application(s) Topical two times a day  dextrose 50% Injectable 25 Gram(s) IV Push once  dextrose 50% Injectable 12.5 Gram(s) IV Push once  glucagon  Injectable 1 milliGRAM(s) IntraMuscular once  influenza  Vaccine (HIGH DOSE) 0.7 milliLiter(s) IntraMuscular once  insulin lispro (ADMELOG) corrective regimen sliding scale   SubCutaneous three times a day before meals  insulin lispro (ADMELOG) corrective regimen sliding scale   SubCutaneous at bedtime  phenytoin   Suspension 100 milliGRAM(s) Oral at bedtime  primidone 250 milliGRAM(s) Oral at bedtime  propranolol 20 milliGRAM(s) Oral two times a day  sodium bicarbonate 1300 milliGRAM(s) Oral three times a day  sodium chloride 0.9% lock flush 3 milliLiter(s) IV Push every 8 hours    MEDICATIONS  (PRN):  dextrose Oral Gel 15 Gram(s) Oral once PRN Blood Glucose LESS THAN 70 milliGRAM(s)/deciliter  dextrose Oral Gel 15 Gram(s) Oral once PRN Blood Glucose LESS THAN 70 milliGRAM(s)/deciliter  diphenhydrAMINE 25 milliGRAM(s) Oral every 6 hours PRN Rash and/or Itching      Vital Signs Last 24 Hrs  T(C): 36.4 (07 Dec 2023 11:34), Max: 36.9 (06 Dec 2023 21:11)  T(F): 97.6 (07 Dec 2023 11:34), Max: 98.4 (06 Dec 2023 21:11)  HR: 84 (07 Dec 2023 11:34) (83 - 93)  BP: 128/72 (07 Dec 2023 11:34) (128/72 - 158/73)  BP(mean): --  RR: 18 (07 Dec 2023 11:34) (18 - 18)  SpO2: 98% (07 Dec 2023 11:34) (97% - 100%)    Parameters below as of 07 Dec 2023 11:34  Patient On (Oxygen Delivery Method): room air      CAPILLARY BLOOD GLUCOSE      POCT Blood Glucose.: 99 mg/dL (07 Dec 2023 08:45)  POCT Blood Glucose.: 154 mg/dL (06 Dec 2023 21:41)  POCT Blood Glucose.: 128 mg/dL (06 Dec 2023 17:37)  POCT Blood Glucose.: 126 mg/dL (06 Dec 2023 13:08)    I&O's Summary      PHYSICAL EXAM:  GENERAL: NAD, well-developed  HEAD:  Atraumatic, Normocephalic  EYES: EOMI, PERRLA, conjunctiva and sclera clear  NECK: Supple, No JVD  CHEST/LUNG: Clear to auscultation bilaterally; No wheeze  HEART: Regular rate and rhythm; No murmurs, rubs, or gallops  ABDOMEN: Soft, Nontender, Nondistended; Bowel sounds present  EXTREMITIES:  2+ Peripheral Pulses, No clubbing, cyanosis, or edema  PSYCH: AAOx3  NEUROLOGY: non-focal  SKIN: rash    LABS:                        9.4    5.78  )-----------( 142      ( 07 Dec 2023 06:10 )             29.3     12-07    143  |  114<H>  |  48<H>  ----------------------------<  96  4.3   |  17<L>  |  1.80<H>    Ca    8.3<L>      07 Dec 2023 06:07  Phos  3.7     12-06  Mg     2.0     12-06    TPro  6.4  /  Alb  3.8  /  TBili  0.2  /  DBili  x   /  AST  16  /  ALT  17  /  AlkPhos  64  12-07          Urinalysis Basic - ( 07 Dec 2023 06:07 )    Color: x / Appearance: x / SG: x / pH: x  Gluc: 96 mg/dL / Ketone: x  / Bili: x / Urobili: x   Blood: x / Protein: x / Nitrite: x   Leuk Esterase: x / RBC: x / WBC x   Sq Epi: x / Non Sq Epi: x / Bacteria: x        RADIOLOGY & ADDITIONAL TESTS:    Imaging Personally Reviewed:    Consultant(s) Notes Reviewed:      Care Discussed with Consultants/Other Providers:

## 2023-12-07 NOTE — PROGRESS NOTE ADULT - ASSESSMENT
****INCOMPLETE*****    # Morbilliform rash--ddx includes exanthematous drug eruption, viral, drug-induced hypersensitivity syndrome (DIHS/DRESS)  - Patient has been afebrile, is without lymphadenopathy, has an eosinophilia <10%, and has an elevated Cr (etiology unclear--possibly AIN per nephrology). RegiSCAR score for DRESS is 1-2 (no fever, no LAD, unknown atypical lymphs, no eosinophilia >10%, skin rash extent >50%, both edema and purpura, internal organ involvement possibly given elevated Cr, unclear about resolution in 15 days at this point)--possible case of DRESS.  - Culprit medication is unclear. Per patient's son has been on his home medications for years. Started PPI inpatient; rash timing may be early for that to be causative agent.     PLAN:   - Please trend daily CBC with diff and CMP  - Will call patient's pharmacy tomorrow to determine whether/when keflex was administered  - Start clobetasol 0.05% ointment BID to affected areas except for the face  - Please obtain RVP, CMV PCR, HHV6 PCR, and EBV serologies  - Suggest investigation of urine sediment to determine whether elevation in Cr represents AIN    Patient was seen at bedside and staffed in person with the dermatology attending Dr. Acuna.   Recommendations were communicated with the primary team.  Please page [phone] for further related questions.    Eulalia Chawla MD  Resident Physician, PGY3  [facility] Dermatology  Pager: [phone] ****INCOMPLETE*****    # Morbilliform rash--ddx includes exanthematous drug eruption, viral, drug-induced hypersensitivity syndrome (DIHS/DRESS)  - Patient has been afebrile, is without lymphadenopathy, has an eosinophilia <10%, and has an elevated Cr (etiology unclear--possibly AIN per nephrology). RegiSCAR score for DRESS is 1-2 (no fever, no LAD, unknown atypical lymphs, no eosinophilia >10%, skin rash extent >50%, both edema and purpura, internal organ involvement possibly given elevated Cr, unclear about resolution in 15 days at this point)--possible case of DRESS.  - Culprit medication is unclear. Per patient's son has been on his home medications for years. Started PPI inpatient; rash timing may be early for that to be causative agent.     PLAN:   - Please trend daily CBC with diff and CMP  - Will call patient's pharmacy tomorrow to determine whether/when keflex was administered  - Start clobetasol 0.05% ointment BID to affected areas except for the face  - Please obtain RVP, CMV PCR, HHV6 PCR, and EBV serologies  - Suggest investigation of urine sediment to determine whether elevation in Cr represents AIN    Patient was seen at bedside and staffed in person with the dermatology attending Dr. Acuna.   Recommendations were communicated with the primary team.  Please page [phone] for further related questions.    Eulalia Chawla MD  Resident Physician, PGY3  [facility] Dermatology  Pager: [phone] # Morbilliform rash--ddx includes exanthematous drug eruption, viral, drug-induced hypersensitivity syndrome (DIHS/DRESS)  - Patient has been afebrile, is without lymphadenopathy, and has an elevated Cr (etiology unclear--discussed possibility of AIN with nephrology) to 1.8 with unclear baseline. Eos uptrending; 13.3% from 9.6% yesterday.   - Culprit medication is unclear. Started PPI inpatient; rash timing may be early for that to be causative agent. Called patient's son and their pharmacy ([address])--son reports his current daily home meds are phenytoin, primidone, lasix, losartan, and propranolol. Per pharmacy, phenytoin was last filled in May 2023 and primidone was filled Sept 2022. Son notes patient only takes primidone and phenytoin once daily rather than BID as rxed. Pharmacy notes no record of keflex having been prescribed and son also does not recall patient having taken any antibiotics recently.  - Received one dose of prednisone 20mg on 12/6.   - RVP negative. EBV/CMV/HHV6 pending    PLAN:   - Please trend daily CBC with diff and CMP  - Continue clobetasol 0.05% ointment BID to affected areas except for the face  - Pending CMV PCR, HHV6 PCR, and EBV serologies  - Discussed with nephrology; suggest investigation of urine sediment to determine whether elevation in Cr represents AIN  - Recommend standing daily non-sedating antihistamine for itch    Patient was seen at bedside and staffed in person with the dermatology attending Dr. Acuna.   Recommendations were communicated with the primary team.  Please page [phone] for further related questions.    Eulalia Chawla MD  Resident Physician, PGY3  [facility] Dermatology  Pager: [phone] # Morbilliform rash--ddx includes exanthematous drug eruption, viral, drug-induced hypersensitivity syndrome (DIHS/DRESS)  - Patient has been afebrile, is without lymphadenopathy, and has an elevated Cr (etiology unclear--discussed possibility of AIN with nephrology) to 1.8 with unclear baseline. Eos uptrending; 13.3% from 9.6% yesterday.   - Culprit medication is unclear. Started PPI inpatient; rash timing may be early for that to be causative agent. Called patient's son and their pharmacy ([address])--son reports his current daily home meds are phenytoin, primidone, lasix, losartan, and propranolol. Per pharmacy, phenytoin was last filled in May 2023 and primidone was filled Sept 2022. Son notes patient only takes primidone and phenytoin once daily rather than BID as rxed. Pharmacy notes no record of keflex having been prescribed and son also does not recall patient having taken any antibiotics recently.  - Received one dose of prednisone 20mg on 12/6.   - RVP negative. EBV/CMV/HHV6 pending    PLAN:   - Please trend daily CBC with diff and CMP  - Continue clobetasol 0.05% ointment BID to affected areas except for the face  - Pending CMV PCR, HHV6 PCR, and EBV serologies  - Discussed with nephrology; suggest investigation of urine sediment to determine whether elevation in Cr represents AIN  - Recommend standing daily non-sedating antihistamine for itch    Patient was seen at bedside and staffed in person with the dermatology attending Dr. Acuna.   Recommendations were communicated with the primary team.  Please page [phone] for further related questions.    Eulalia Chawla MD  Resident Physician, PGY3  [facility] Dermatology  Pager: [phone]

## 2023-12-08 ENCOUNTER — TRANSCRIPTION ENCOUNTER (OUTPATIENT)
Age: 83
End: 2023-12-08

## 2023-12-08 VITALS
TEMPERATURE: 98 F | HEART RATE: 91 BPM | OXYGEN SATURATION: 98 % | DIASTOLIC BLOOD PRESSURE: 71 MMHG | SYSTOLIC BLOOD PRESSURE: 135 MMHG | RESPIRATION RATE: 18 BRPM

## 2023-12-08 LAB
ANION GAP SERPL CALC-SCNC: 13 MMOL/L — SIGNIFICANT CHANGE UP (ref 5–17)
ANION GAP SERPL CALC-SCNC: 13 MMOL/L — SIGNIFICANT CHANGE UP (ref 5–17)
BASOPHILS # BLD AUTO: 0.01 K/UL — SIGNIFICANT CHANGE UP (ref 0–0.2)
BASOPHILS # BLD AUTO: 0.01 K/UL — SIGNIFICANT CHANGE UP (ref 0–0.2)
BASOPHILS NFR BLD AUTO: 0.2 % — SIGNIFICANT CHANGE UP (ref 0–2)
BASOPHILS NFR BLD AUTO: 0.2 % — SIGNIFICANT CHANGE UP (ref 0–2)
BUN SERPL-MCNC: 40 MG/DL — HIGH (ref 7–23)
BUN SERPL-MCNC: 40 MG/DL — HIGH (ref 7–23)
CALCIUM SERPL-MCNC: 8.1 MG/DL — LOW (ref 8.4–10.5)
CALCIUM SERPL-MCNC: 8.1 MG/DL — LOW (ref 8.4–10.5)
CHLORIDE SERPL-SCNC: 115 MMOL/L — HIGH (ref 96–108)
CHLORIDE SERPL-SCNC: 115 MMOL/L — HIGH (ref 96–108)
CO2 SERPL-SCNC: 16 MMOL/L — LOW (ref 22–31)
CO2 SERPL-SCNC: 16 MMOL/L — LOW (ref 22–31)
CREAT SERPL-MCNC: 1.64 MG/DL — HIGH (ref 0.5–1.3)
CREAT SERPL-MCNC: 1.64 MG/DL — HIGH (ref 0.5–1.3)
EGFR: 41 ML/MIN/1.73M2 — LOW
EGFR: 41 ML/MIN/1.73M2 — LOW
EOSINOPHIL # BLD AUTO: 0.69 K/UL — HIGH (ref 0–0.5)
EOSINOPHIL # BLD AUTO: 0.69 K/UL — HIGH (ref 0–0.5)
EOSINOPHIL NFR BLD AUTO: 14.3 % — HIGH (ref 0–6)
EOSINOPHIL NFR BLD AUTO: 14.3 % — HIGH (ref 0–6)
GLUCOSE BLDC GLUCOMTR-MCNC: 106 MG/DL — HIGH (ref 70–99)
GLUCOSE BLDC GLUCOMTR-MCNC: 106 MG/DL — HIGH (ref 70–99)
GLUCOSE BLDC GLUCOMTR-MCNC: 110 MG/DL — HIGH (ref 70–99)
GLUCOSE BLDC GLUCOMTR-MCNC: 110 MG/DL — HIGH (ref 70–99)
GLUCOSE BLDC GLUCOMTR-MCNC: 134 MG/DL — HIGH (ref 70–99)
GLUCOSE BLDC GLUCOMTR-MCNC: 134 MG/DL — HIGH (ref 70–99)
GLUCOSE SERPL-MCNC: 84 MG/DL — SIGNIFICANT CHANGE UP (ref 70–99)
GLUCOSE SERPL-MCNC: 84 MG/DL — SIGNIFICANT CHANGE UP (ref 70–99)
HCT VFR BLD CALC: 27.3 % — LOW (ref 39–50)
HCT VFR BLD CALC: 27.3 % — LOW (ref 39–50)
HGB BLD-MCNC: 8.7 G/DL — LOW (ref 13–17)
HGB BLD-MCNC: 8.7 G/DL — LOW (ref 13–17)
IMM GRANULOCYTES NFR BLD AUTO: 0.2 % — SIGNIFICANT CHANGE UP (ref 0–0.9)
IMM GRANULOCYTES NFR BLD AUTO: 0.2 % — SIGNIFICANT CHANGE UP (ref 0–0.9)
LYMPHOCYTES # BLD AUTO: 0.83 K/UL — LOW (ref 1–3.3)
LYMPHOCYTES # BLD AUTO: 0.83 K/UL — LOW (ref 1–3.3)
LYMPHOCYTES # BLD AUTO: 17.1 % — SIGNIFICANT CHANGE UP (ref 13–44)
LYMPHOCYTES # BLD AUTO: 17.1 % — SIGNIFICANT CHANGE UP (ref 13–44)
MCHC RBC-ENTMCNC: 31.9 GM/DL — LOW (ref 32–36)
MCHC RBC-ENTMCNC: 31.9 GM/DL — LOW (ref 32–36)
MCHC RBC-ENTMCNC: 32.7 PG — SIGNIFICANT CHANGE UP (ref 27–34)
MCHC RBC-ENTMCNC: 32.7 PG — SIGNIFICANT CHANGE UP (ref 27–34)
MCV RBC AUTO: 102.6 FL — HIGH (ref 80–100)
MCV RBC AUTO: 102.6 FL — HIGH (ref 80–100)
MONOCYTES # BLD AUTO: 0.59 K/UL — SIGNIFICANT CHANGE UP (ref 0–0.9)
MONOCYTES # BLD AUTO: 0.59 K/UL — SIGNIFICANT CHANGE UP (ref 0–0.9)
MONOCYTES NFR BLD AUTO: 12.2 % — SIGNIFICANT CHANGE UP (ref 2–14)
MONOCYTES NFR BLD AUTO: 12.2 % — SIGNIFICANT CHANGE UP (ref 2–14)
NEUTROPHILS # BLD AUTO: 2.71 K/UL — SIGNIFICANT CHANGE UP (ref 1.8–7.4)
NEUTROPHILS # BLD AUTO: 2.71 K/UL — SIGNIFICANT CHANGE UP (ref 1.8–7.4)
NEUTROPHILS NFR BLD AUTO: 56 % — SIGNIFICANT CHANGE UP (ref 43–77)
NEUTROPHILS NFR BLD AUTO: 56 % — SIGNIFICANT CHANGE UP (ref 43–77)
NRBC # BLD: 0 /100 WBCS — SIGNIFICANT CHANGE UP (ref 0–0)
NRBC # BLD: 0 /100 WBCS — SIGNIFICANT CHANGE UP (ref 0–0)
PLATELET # BLD AUTO: 143 K/UL — LOW (ref 150–400)
PLATELET # BLD AUTO: 143 K/UL — LOW (ref 150–400)
POTASSIUM SERPL-MCNC: 4.4 MMOL/L — SIGNIFICANT CHANGE UP (ref 3.5–5.3)
POTASSIUM SERPL-MCNC: 4.4 MMOL/L — SIGNIFICANT CHANGE UP (ref 3.5–5.3)
POTASSIUM SERPL-SCNC: 4.4 MMOL/L — SIGNIFICANT CHANGE UP (ref 3.5–5.3)
POTASSIUM SERPL-SCNC: 4.4 MMOL/L — SIGNIFICANT CHANGE UP (ref 3.5–5.3)
RBC # BLD: 2.66 M/UL — LOW (ref 4.2–5.8)
RBC # BLD: 2.66 M/UL — LOW (ref 4.2–5.8)
RBC # FLD: 14.3 % — SIGNIFICANT CHANGE UP (ref 10.3–14.5)
RBC # FLD: 14.3 % — SIGNIFICANT CHANGE UP (ref 10.3–14.5)
SODIUM SERPL-SCNC: 144 MMOL/L — SIGNIFICANT CHANGE UP (ref 135–145)
SODIUM SERPL-SCNC: 144 MMOL/L — SIGNIFICANT CHANGE UP (ref 135–145)
WBC # BLD: 4.84 K/UL — SIGNIFICANT CHANGE UP (ref 3.8–10.5)
WBC # BLD: 4.84 K/UL — SIGNIFICANT CHANGE UP (ref 3.8–10.5)
WBC # FLD AUTO: 4.84 K/UL — SIGNIFICANT CHANGE UP (ref 3.8–10.5)
WBC # FLD AUTO: 4.84 K/UL — SIGNIFICANT CHANGE UP (ref 3.8–10.5)

## 2023-12-08 PROCEDURE — 71275 CT ANGIOGRAPHY CHEST: CPT | Mod: MA

## 2023-12-08 PROCEDURE — 82570 ASSAY OF URINE CREATININE: CPT

## 2023-12-08 PROCEDURE — 81001 URINALYSIS AUTO W/SCOPE: CPT

## 2023-12-08 PROCEDURE — 82803 BLOOD GASES ANY COMBINATION: CPT

## 2023-12-08 PROCEDURE — 82330 ASSAY OF CALCIUM: CPT

## 2023-12-08 PROCEDURE — 0225U NFCT DS DNA&RNA 21 SARSCOV2: CPT

## 2023-12-08 PROCEDURE — 85384 FIBRINOGEN ACTIVITY: CPT

## 2023-12-08 PROCEDURE — 82728 ASSAY OF FERRITIN: CPT

## 2023-12-08 PROCEDURE — 70496 CT ANGIOGRAPHY HEAD: CPT | Mod: MA

## 2023-12-08 PROCEDURE — 82746 ASSAY OF FOLIC ACID SERUM: CPT

## 2023-12-08 PROCEDURE — 82010 KETONE BODYS QUAN: CPT

## 2023-12-08 PROCEDURE — 99285 EMERGENCY DEPT VISIT HI MDM: CPT

## 2023-12-08 PROCEDURE — 36415 COLL VENOUS BLD VENIPUNCTURE: CPT

## 2023-12-08 PROCEDURE — 85025 COMPLETE CBC W/AUTO DIFF WBC: CPT

## 2023-12-08 PROCEDURE — 70450 CT HEAD/BRAIN W/O DYE: CPT | Mod: MA

## 2023-12-08 PROCEDURE — 82565 ASSAY OF CREATININE: CPT

## 2023-12-08 PROCEDURE — 84132 ASSAY OF SERUM POTASSIUM: CPT

## 2023-12-08 PROCEDURE — 83735 ASSAY OF MAGNESIUM: CPT

## 2023-12-08 PROCEDURE — 82607 VITAMIN B-12: CPT

## 2023-12-08 PROCEDURE — 70487 CT MAXILLOFACIAL W/DYE: CPT | Mod: MA

## 2023-12-08 PROCEDURE — 86901 BLOOD TYPING SEROLOGIC RH(D): CPT

## 2023-12-08 PROCEDURE — 86900 BLOOD TYPING SEROLOGIC ABO: CPT

## 2023-12-08 PROCEDURE — 70498 CT ANGIOGRAPHY NECK: CPT | Mod: MA

## 2023-12-08 PROCEDURE — 85045 AUTOMATED RETICULOCYTE COUNT: CPT

## 2023-12-08 PROCEDURE — 86850 RBC ANTIBODY SCREEN: CPT

## 2023-12-08 PROCEDURE — 84295 ASSAY OF SERUM SODIUM: CPT

## 2023-12-08 PROCEDURE — 84100 ASSAY OF PHOSPHORUS: CPT

## 2023-12-08 PROCEDURE — 85027 COMPLETE CBC AUTOMATED: CPT

## 2023-12-08 PROCEDURE — 85014 HEMATOCRIT: CPT

## 2023-12-08 PROCEDURE — 80048 BASIC METABOLIC PNL TOTAL CA: CPT

## 2023-12-08 PROCEDURE — 83036 HEMOGLOBIN GLYCOSYLATED A1C: CPT

## 2023-12-08 PROCEDURE — 86663 EPSTEIN-BARR ANTIBODY: CPT

## 2023-12-08 PROCEDURE — 83605 ASSAY OF LACTIC ACID: CPT

## 2023-12-08 PROCEDURE — 85730 THROMBOPLASTIN TIME PARTIAL: CPT

## 2023-12-08 PROCEDURE — 84443 ASSAY THYROID STIM HORMONE: CPT

## 2023-12-08 PROCEDURE — 80053 COMPREHEN METABOLIC PANEL: CPT

## 2023-12-08 PROCEDURE — 86665 EPSTEIN-BARR CAPSID VCA: CPT

## 2023-12-08 PROCEDURE — 93005 ELECTROCARDIOGRAM TRACING: CPT

## 2023-12-08 PROCEDURE — 97530 THERAPEUTIC ACTIVITIES: CPT

## 2023-12-08 PROCEDURE — 93306 TTE W/DOPPLER COMPLETE: CPT

## 2023-12-08 PROCEDURE — 86664 EPSTEIN-BARR NUCLEAR ANTIGEN: CPT

## 2023-12-08 PROCEDURE — 85018 HEMOGLOBIN: CPT

## 2023-12-08 PROCEDURE — 83550 IRON BINDING TEST: CPT

## 2023-12-08 PROCEDURE — 74176 CT ABD & PELVIS W/O CONTRAST: CPT

## 2023-12-08 PROCEDURE — 82947 ASSAY GLUCOSE BLOOD QUANT: CPT

## 2023-12-08 PROCEDURE — 84156 ASSAY OF PROTEIN URINE: CPT

## 2023-12-08 PROCEDURE — 72125 CT NECK SPINE W/O DYE: CPT | Mod: MA

## 2023-12-08 PROCEDURE — 74174 CTA ABD&PLVS W/CONTRAST: CPT | Mod: MA

## 2023-12-08 PROCEDURE — 82435 ASSAY OF BLOOD CHLORIDE: CPT

## 2023-12-08 PROCEDURE — 82962 GLUCOSE BLOOD TEST: CPT

## 2023-12-08 PROCEDURE — 97116 GAIT TRAINING THERAPY: CPT

## 2023-12-08 PROCEDURE — 83690 ASSAY OF LIPASE: CPT

## 2023-12-08 PROCEDURE — 82550 ASSAY OF CK (CPK): CPT

## 2023-12-08 PROCEDURE — 85610 PROTHROMBIN TIME: CPT

## 2023-12-08 PROCEDURE — 83540 ASSAY OF IRON: CPT

## 2023-12-08 PROCEDURE — 97161 PT EVAL LOW COMPLEX 20 MIN: CPT

## 2023-12-08 PROCEDURE — 85520 HEPARIN ASSAY: CPT

## 2023-12-08 RX ORDER — PROPRANOLOL HCL 160 MG
1 CAPSULE, EXTENDED RELEASE 24HR ORAL
Qty: 0 | Refills: 0 | DISCHARGE

## 2023-12-08 RX ORDER — LOSARTAN POTASSIUM 100 MG/1
1 TABLET, FILM COATED ORAL
Qty: 0 | Refills: 0 | DISCHARGE

## 2023-12-08 RX ORDER — PRIMIDONE 250 MG/1
1 TABLET ORAL
Qty: 0 | Refills: 0 | DISCHARGE

## 2023-12-08 RX ORDER — SODIUM BICARBONATE 1 MEQ/ML
2 SYRINGE (ML) INTRAVENOUS
Qty: 84 | Refills: 0
Start: 2023-12-08 | End: 2023-12-21

## 2023-12-08 RX ORDER — FUROSEMIDE 40 MG
1 TABLET ORAL
Qty: 0 | Refills: 0 | DISCHARGE

## 2023-12-08 RX ADMIN — Medication 1300 MILLIGRAM(S): at 05:40

## 2023-12-08 RX ADMIN — Medication 1 DROP(S): at 18:44

## 2023-12-08 RX ADMIN — Medication 1300 MILLIGRAM(S): at 13:01

## 2023-12-08 RX ADMIN — SODIUM CHLORIDE 3 MILLILITER(S): 9 INJECTION INTRAMUSCULAR; INTRAVENOUS; SUBCUTANEOUS at 15:48

## 2023-12-08 RX ADMIN — Medication 1 DROP(S): at 05:40

## 2023-12-08 RX ADMIN — Medication 1 APPLICATION(S): at 05:42

## 2023-12-08 RX ADMIN — Medication 1 APPLICATION(S): at 18:44

## 2023-12-08 RX ADMIN — Medication 1 DROP(S): at 13:01

## 2023-12-08 RX ADMIN — SODIUM CHLORIDE 3 MILLILITER(S): 9 INJECTION INTRAMUSCULAR; INTRAVENOUS; SUBCUTANEOUS at 05:56

## 2023-12-08 NOTE — DISCHARGE NOTE PROVIDER - HOSPITAL COURSE
HPI:  Chantal Guadalupe is an 83 y.o. M PMHx significant for epilepsy, HTN, BPH, hypothyroidism, who presents to the ED s/p fall to pavement approximately 15:00 today.  Patient had direct impact to left side of face presents with severe ecchymosis of the left eye which is closed shut at this time.  Son who accompanies patient and translates as patient is non-English speaking, states patient was walking with a umbrella 5 steps in front of him.  Umbrella slipped causing him to fall to the floor.  Patient's son states prior to fall patient was not lightheaded or dizzy. He denies LOC after fall.  Patient did not appear to be confused after fall.  Additionally, c.o. pain to the L. knee which has a superficial knee abrasion.  Denies HA, CP, SOB, ABD pain, or any other complaints at this time. Pt does not wear corrective lens. (27 Nov 2023 23:35)    Hospital Course:  83 y.o. M PMHx significant for epilepsy, HTN, BPH, hypothyroidism, type a aortic dissection s/p repair in 2019 who presents to the ED s/p fall, admitted to CTU for monitoring s/p labetalol/cardene gtt. Medicine consulted for BP management and tx to medicine service.   Patient is feeling well without any complaints. Currently normotensive without any BP agents. 2d echo normal.  - Would not add any BP agents at this time as SBP remains 100-120s, if trends up add back home meds.    allergic reaction with rash  - unknown culprit  - derm eval appreciated  -  DRESS syndrome ruled out c/w Topical as recommended by Derm     CKD  - nephrology following  - avoid nephrotoxins  - cont bicarb  - creatinine is steady  unclear creatinine baseline, patient does not recall baseline creatinine. attempted to call patient outpatient provider listed, unable to get through x 2.  acidosis, bicarb continues to worsen  increase sodium bicarbonate to 1300 mg po tid   lactic acid and bet hydroxy butyrate  in range   cpk in range   also with worsening generalized rash, differential added to cbc ?AIN  recent ct a/p without hydro  strict I/O  trend creatinine and electrolytes daily       incidental nonspecific rectal wall enhancement on CT  - recommend opt GI referral if within patient's GOC.     anemia  - iron studies c/w ACD  -  ct neg for rp bleed    diabetes  - fs qid  - hgb a1c 5.4  - insulin ss    dispo - PT eval  - Home PT  d/c planning        Important Medication Changes and Reason:    Active or Pending Issues Requiring Follow-up:    Advanced Directives:   [ x] Full code  [ ] DNR  [ ] Hospice    Discharge Diagnoses:  Fall   Rash   AUSTEN                HPI:  Chantal Guadalupe is an 83 y.o. M PMHx significant for epilepsy, HTN, BPH, hypothyroidism, who presents to the ED s/p fall to pavement approximately 15:00 today.  Patient had direct impact to left side of face presents with severe ecchymosis of the left eye which is closed shut at this time.  Son who accompanies patient and translates as patient is non-English speaking, states patient was walking with a umbrella 5 steps in front of him.  Umbrella slipped causing him to fall to the floor.  Patient's son states prior to fall patient was not lightheaded or dizzy. He denies LOC after fall.  Patient did not appear to be confused after fall.  Additionally, c.o. pain to the L. knee which has a superficial knee abrasion.  Denies HA, CP, SOB, ABD pain, or any other complaints at this time. Pt does not wear corrective lens. (27 Nov 2023 23:35)    Hospital Course:  83 y.o. M PMHx significant for epilepsy, HTN, BPH, hypothyroidism, type a aortic dissection s/p repair in 2019 who presents to the ED s/p fall, admitted to CTU for monitoring s/p labetalol/cardene gtt. Medicine consulted for BP management and tx to medicine service.   Patient is feeling well without any complaints. Currently normotensive without any BP agents. 2d echo normal.  - Would not add any BP agents at this time as SBP remains 100-120s, if trends up add back home meds.    allergic reaction with rash  - unknown culprit  - derm eval appreciated  -  DRESS syndrome ruled out c/w Topical as recommended by Derm     CKD  - nephrology following  - avoid nephrotoxins  - cont bicarb  - creatinine is steady  unclear creatinine baseline, patient does not recall baseline creatinine. attempted to call patient outpatient provider listed, unable to get through x 2.  acidosis, bicarb continues to worsen  increase sodium bicarbonate to 1300 mg po tid   lactic acid and bet hydroxy butyrate  in range   cpk in range   also with worsening generalized rash, differential added to cbc ?AIN  recent ct a/p without hydro  strict I/O  trend creatinine and electrolytes daily       incidental nonspecific rectal wall enhancement on CT  - recommend opt GI referral if within patient's GOC.     anemia  - iron studies c/w ACD  -  ct neg for rp bleed    diabetes  - fs qid  - hgb a1c 5.4  - insulin ss    dispo - PT eval  - Home PT          Important Medication Changes and Reason: c/w with Sodium Bicarbonate 1300 mg Three times a day   Losartan on Hold   Lasix on Hold     Active or Pending Issues Requiring Follow-up: please follow up with  Ophthalmology in 3-4 weeks   Please follow up with PCP  with in a week of discharge    incidental nonspecific rectal wall enhancement on CT , please have PCP refer you to a gastroenterologist     Advanced Directives:   [ x] Full code  [ ] DNR  [ ] Hospice    Discharge Diagnoses:  Fall   Rash  possibly due to  allergic reaction to  Pantoprazole , DRESS  ruled out   AUSTEN

## 2023-12-08 NOTE — PROGRESS NOTE ADULT - SUBJECTIVE AND OBJECTIVE BOX
DATE OF SERVICE: 12-08-23 @ 11:50    Patient is a 83y old  Male who presents with a chief complaint of s/p fall (07 Dec 2023 21:51)      SUBJECTIVE / OVERNIGHT EVENTS:  No chest pain. No shortness of breath. No complaints. No events overnight.  no more pruritis    MEDICATIONS  (STANDING):  artificial tears (preservative free) Ophthalmic Solution 1 Drop(s) Both EYES four times a day  clobetasol 0.05% Ointment 1 Application(s) Topical two times a day  dextrose 50% Injectable 25 Gram(s) IV Push once  dextrose 50% Injectable 12.5 Gram(s) IV Push once  glucagon  Injectable 1 milliGRAM(s) IntraMuscular once  influenza  Vaccine (HIGH DOSE) 0.7 milliLiter(s) IntraMuscular once  insulin lispro (ADMELOG) corrective regimen sliding scale   SubCutaneous three times a day before meals  insulin lispro (ADMELOG) corrective regimen sliding scale   SubCutaneous at bedtime  phenytoin   Suspension 100 milliGRAM(s) Oral at bedtime  primidone 250 milliGRAM(s) Oral at bedtime  propranolol 20 milliGRAM(s) Oral two times a day  sodium bicarbonate 1300 milliGRAM(s) Oral three times a day  sodium chloride 0.9% lock flush 3 milliLiter(s) IV Push every 8 hours    MEDICATIONS  (PRN):  dextrose Oral Gel 15 Gram(s) Oral once PRN Blood Glucose LESS THAN 70 milliGRAM(s)/deciliter  dextrose Oral Gel 15 Gram(s) Oral once PRN Blood Glucose LESS THAN 70 milliGRAM(s)/deciliter  diphenhydrAMINE 25 milliGRAM(s) Oral every 6 hours PRN Rash and/or Itching      Vital Signs Last 24 Hrs  T(C): 36.6 (08 Dec 2023 11:28), Max: 37.1 (07 Dec 2023 21:12)  T(F): 97.9 (08 Dec 2023 11:28), Max: 98.7 (07 Dec 2023 21:12)  HR: 91 (08 Dec 2023 11:28) (75 - 91)  BP: 135/71 (08 Dec 2023 11:28) (135/66 - 146/71)  BP(mean): --  RR: 18 (08 Dec 2023 11:28) (18 - 18)  SpO2: 98% (08 Dec 2023 11:28) (96% - 98%)    Parameters below as of 08 Dec 2023 11:28  Patient On (Oxygen Delivery Method): room air      CAPILLARY BLOOD GLUCOSE      POCT Blood Glucose.: 110 mg/dL (08 Dec 2023 09:10)  POCT Blood Glucose.: 153 mg/dL (07 Dec 2023 21:53)  POCT Blood Glucose.: 96 mg/dL (07 Dec 2023 18:09)  POCT Blood Glucose.: 99 mg/dL (07 Dec 2023 12:46)    I&O's Summary    07 Dec 2023 07:01  -  08 Dec 2023 07:00  --------------------------------------------------------  IN: 440 mL / OUT: 0 mL / NET: 440 mL    08 Dec 2023 07:01  -  08 Dec 2023 11:50  --------------------------------------------------------  IN: 180 mL / OUT: 0 mL / NET: 180 mL        PHYSICAL EXAM:  GENERAL: NAD, well-developed  HEAD:  Atraumatic, Normocephalic  EYES: EOMI, PERRLA, conjunctiva and sclera clear  NECK: Supple, No JVD  CHEST/LUNG: Clear to auscultation bilaterally; No wheeze  HEART: Regular rate and rhythm; No murmurs, rubs, or gallops  ABDOMEN: Soft, Nontender, Nondistended; Bowel sounds present  EXTREMITIES:  2+ Peripheral Pulses, No clubbing, cyanosis, or edema  PSYCH: AAOx3  NEUROLOGY: non-focal  SKIN: No rashes or lesions    LABS:                        8.7    4.84  )-----------( 143      ( 08 Dec 2023 07:05 )             27.3     12-08    144  |  115<H>  |  40<H>  ----------------------------<  84  4.4   |  16<L>  |  1.64<H>    Ca    8.1<L>      08 Dec 2023 07:04    TPro  6.4  /  Alb  3.8  /  TBili  0.2  /  DBili  x   /  AST  16  /  ALT  17  /  AlkPhos  64  12-07          Urinalysis Basic - ( 08 Dec 2023 07:04 )    Color: x / Appearance: x / SG: x / pH: x  Gluc: 84 mg/dL / Ketone: x  / Bili: x / Urobili: x   Blood: x / Protein: x / Nitrite: x   Leuk Esterase: x / RBC: x / WBC x   Sq Epi: x / Non Sq Epi: x / Bacteria: x        RADIOLOGY & ADDITIONAL TESTS:    Imaging Personally Reviewed:    Consultant(s) Notes Reviewed:      Care Discussed with Consultants/Other Providers:

## 2023-12-08 NOTE — PROGRESS NOTE ADULT - ASSESSMENT
83 y.o. M PMHx significant for epilepsy, HTN, BPH, hypothyroidism, type a aortic dissection s/p repair in 2019 who presents to the ED s/p fall, admitted to CTU for monitoring s/p labetalol/cardene gtt. Medicine consulted for BP management and tx to medicine service.   Patient is feeling well without any complaints. Currently normotensive without any BP agents. 2d echo normal.  - Would not add any BP agents at this time as SBP remains 100-120s, if trends up add back home meds.    allergic reaction with rash  - unknown culprit  - derm eval appreciated  - r/o DRESS syndrome    CKD  - nephrology following  - avoid nephrotoxins  - cont bicarb  - creatinine is steady  unclear creatinine baseline, patient does not recall baseline creatinine. attempted to call patient outpatient provider listed, unable to get through x 2.  acidosis, bicarb continues to worsen  increase sodium bicarbonate to 1300 mg po tid   lactic acid and bet hydroxy butyrate  in range   cpk in range   also with worsening generalized rash, differential added to cbc ?AIN  recent ct a/p without hydro  strict I/O  trend creatinine and electrolytes daily       incidental nonspecific rectal wall enhancement on CT  - recommend opt GI referral if within patient's GOC.     anemia  - iron studies c/w ACD  -  ct neg for rp bleed    diabetes  - fs qid  - hgb a1c 5.4  - insulin ss    dispo - PT eval  - Home PT  d/c planning

## 2023-12-08 NOTE — DISCHARGE NOTE PROVIDER - PROVIDER TOKENS
PROVIDER:[TOKEN:[[id number]]],PROVIDER:[TOKEN:[[id number]]],FREE:[LAST:[DR Chow],FIRST:[Hsiu],PHONE:[(   )    -],FAX:[(   )    -],ADDRESS:[PCP]] PROVIDER:[TOKEN:[[id number]]],PROVIDER:[TOKEN:[[id number]]],FREE:[LAST:[DR Chow],FIRST:[Hsiu],PHONE:[(   )    -],FAX:[(   )    -],ADDRESS:[PCP]]

## 2023-12-08 NOTE — PROGRESS NOTE ADULT - PROVIDER SPECIALTY LIST ADULT
Internal Medicine
Nephrology
Internal Medicine
Internal Medicine
Nephrology
Critical Care
Internal Medicine
Nephrology
Nephrology
Ophthalmology
Internal Medicine
Internal Medicine
Dermatology
Internal Medicine
Internal Medicine
Nephrology
CT Surgery

## 2023-12-08 NOTE — DISCHARGE NOTE PROVIDER - CARE PROVIDER_API CALL
mEile Morin  Nephrology  [address], [address]  [place] [address]  Phone: [phone]  Fax: [phone]  Follow Up Time:     Jnaa Kumar  Ophthalmology  [address] [place] [address]  Phone: [phone]  Fax: [phone]  Follow Up Time:     Monie Mo  PCP  Phone: (   )    -  Fax: (   )    -  Follow Up Time:    Emile Morin  Nephrology  [address], [address]  [place] [address]  Phone: [phone]  Fax: [phone]  Follow Up Time:     Jana Kumar  Ophthalmology  [address] [place] [address]  Phone: [phone]  Fax: [phone]  Follow Up Time:     Monie Mo  PCP  Phone: (   )    -  Fax: (   )    -  Follow Up Time:

## 2023-12-08 NOTE — DISCHARGE NOTE NURSING/CASE MANAGEMENT/SOCIAL WORK - NSDCFUADDAPPT_GEN_ALL_CORE_FT
APPTS ARE READY TO BE MADE: [X ] YES    Best Family or Patient Contact (if needed):    Additional Information about above appointments (if needed):    1: Please follow up with  PCP DR Chow in 1 week   2:  Please follow up with DR Kumar   3:     Other comments or requests:

## 2023-12-08 NOTE — DISCHARGE NOTE NURSING/CASE MANAGEMENT/SOCIAL WORK - NSDCPEFALRISK_GEN_ALL_CORE
For information on Fall & Injury Prevention, visit: [email or web address] OR  [email or web address] OR  https://www.cdc.gov/steadi/patient.html For information on Fall & Injury Prevention, visit: [email or web address] OR  [email or web address] OR  https://www.cdc.gov/steadi/patient.html

## 2023-12-08 NOTE — DISCHARGE NOTE PROVIDER - CARE PROVIDERS DIRECT ADDRESSES
,DirectAddress_Unknown,[email or web address]	,DirectAddress_Unknown ,DirectAddress_Unknown,[email or web address]	,DirectAddress_Unknown

## 2023-12-08 NOTE — DISCHARGE NOTE PROVIDER - NSDCFUADDAPPT_GEN_ALL_CORE_FT
APPTS ARE READY TO BE MADE: [X ] YES    Best Family or Patient Contact (if needed):    Additional Information about above appointments (if needed):    1: Please follow up with  PCP DR Chow in 1 week   2:  Please follow up with DR Kumar   3:     Other comments or requests:    APPTS ARE READY TO BE MADE: [X ] YES    Best Family or Patient Contact (if needed):    Additional Information about above appointments (if needed):    1: Please follow up with  PCP DR Chow in 1 week   2:  Please follow up with DR Kumar   3:     Other comments or requests:   Outreached on 12/11, 12/12 and 12/13 which have been unsuccessful. Will await call back from patient/caregiver to coordinate follow up care.  APPTS ARE READY TO BE MADE: [X ] YES    Best Family or Patient Contact (if needed):    Additional Information about above appointments (if needed):    1: Please follow up with  PCP DR Chow in 1 week   2:  Please follow up with DR Kumar   3:     Other comments or requests:   Outreached on 12/11, 12/12 and 12/13 which have been unsuccessful. Will await call back from patient/caregiver to coordinate follow up care.     Patient was provided with follow up request details and was advised to call to schedule follow up within specified time frame. No scheduling assistance is needed at this time.

## 2023-12-08 NOTE — DISCHARGE NOTE PROVIDER - NSDCMRMEDTOKEN_GEN_ALL_CORE_FT
clobetasol 0.05% topical ointment: Apply topically to affected area 2 times a day 1 Apply topically to affected area 2 times a day  diphenhydrAMINE 25 mg oral capsule: 1 cap(s) orally every 6 hours as needed for Rash and/or Itching MDD: 4  ocular lubricant ophthalmic solution: 1 drop(s) to each affected eye 4 times a day  phenytoin 100 mg oral capsule: 1 cap(s) orally once a day  primidone 250 mg oral tablet: 1 tab(s) orally once a day  propranolol 20 mg oral tablet: 1 tab(s) orally 2 times a day  sodium bicarbonate 650 mg oral tablet: 2 tab(s) orally 3 times a day

## 2023-12-08 NOTE — DISCHARGE NOTE NURSING/CASE MANAGEMENT/SOCIAL WORK - PATIENT PORTAL LINK FT
You can access the FollowMyHealth Patient Portal offered by [facility] by registering at the following website: [email or web address]. By joining [employer]’s FollowMyHealth portal, you will also be able to view your health information using other applications (apps) compatible with our system. You can access the FollowMyHealth Patient Portal offered by [facility] by registering at the following website: [email or web address]. By joining [employer]’s FollowMyHealth portal, you will also be able to view your health information using other applications (apps) compatible with our system.

## 2023-12-08 NOTE — DISCHARGE NOTE PROVIDER - NSDCCPCAREPLAN_GEN_ALL_CORE_FT
PRINCIPAL DISCHARGE DIAGNOSIS  Diagnosis: Fall at home  Assessment and Plan of Treatment: fall precautions   Home PT      SECONDARY DISCHARGE DIAGNOSES  Diagnosis: Trauma to eye, left  Assessment and Plan of Treatment: c/w Eye drops   OP follow up    Diagnosis: Aortic dissection  Assessment and Plan of Treatment:     Diagnosis: Aortic dissection  Assessment and Plan of Treatment:     Diagnosis: Aortic dissection  Assessment and Plan of Treatment:      PRINCIPAL DISCHARGE DIAGNOSIS  Diagnosis: Fall at home  Assessment and Plan of Treatment: fall precautions   Home PT      SECONDARY DISCHARGE DIAGNOSES  Diagnosis: H/O: HTN (hypertension)  Assessment and Plan of Treatment: Losartan on Hold duue to AUSTEN   Diuretics on Hold due to AUSTEN    Diagnosis: Trauma to eye, left  Assessment and Plan of Treatment: - S/p mechanical fall found to have periorbital ecchymoses/edema  - VA (cc with +2.50 readers) 20/25 OD 20/30 OS ; IOP 14 OD 16 OS ; no rAPD OU   - EOMs full OU, CVF full OU, color plates full OU   - Anterior exam (left eye) with AC deep and formed, kristi negative  - DFE (left eye) with PVD, no holes, tears or detachments  - Anterior exam and DFE (right eye) wnl   - No acute ophthalmalic intervention   - Recommend ice to left eye for the first 48hr after trauma (20 min out of each hour), HOB elevation if tolerated and approved by primary team (not necessary) to promote relief of edema  - Start artificial tears QID (ordered)  - Pt will require close outpatient follow up once ready for discharge given likely traumatic PVD OS to monitor for development of retinal pathology due to sequelae of trauma      Diagnosis: AUSTEN (acute kidney injury)  Assessment and Plan of Treatment: Avoid taking (NSAIDs) - (ex: Ibuprofen, Advil, Celebrex, Naprosyn)  Avoid taking any nephrotoxic agents (can harm kidneys) - Intravenous contrast for diagnostic testing, combination cold medications.  Have all medications adjusted for your renal function by your Health Care Provider.  Blood pressure control is important.  Take all medication as prescribed.

## 2023-12-12 NOTE — CHART NOTE - NSCHARTNOTEFT_GEN_A_CORE
[phone] is for patient's son Pb. Left message [phone] is for patient's son Pb. Left message

## 2024-10-14 ENCOUNTER — APPOINTMENT (OUTPATIENT)
Dept: GASTROENTEROLOGY | Facility: CLINIC | Age: 84
End: 2024-10-14